# Patient Record
Sex: FEMALE | Race: WHITE | Employment: FULL TIME
[De-identification: names, ages, dates, MRNs, and addresses within clinical notes are randomized per-mention and may not be internally consistent; named-entity substitution may affect disease eponyms.]

---

## 2017-01-03 ENCOUNTER — TELEPHONE (OUTPATIENT)
Dept: FAMILY MEDICINE CLINIC | Facility: CLINIC | Age: 27
End: 2017-01-03

## 2017-01-03 DIAGNOSIS — F17.200 SMOKES AND MOTIVATED TO QUIT: Primary | ICD-10-CM

## 2017-01-03 RX ORDER — VARENICLINE TARTRATE 25 MG
KIT ORAL
Qty: 53 EACH | Refills: 0 | Status: SHIPPED | OUTPATIENT
Start: 2017-01-03 | End: 2017-01-26 | Stop reason: DRUGHIGH

## 2017-01-06 DIAGNOSIS — R05.9 COUGH: Primary | ICD-10-CM

## 2017-01-06 DIAGNOSIS — J01.00 ACUTE NON-RECURRENT MAXILLARY SINUSITIS: ICD-10-CM

## 2017-01-06 RX ORDER — LORATADINE/PSEUDOEPHEDRINE 10MG-240MG
TABLET, EXTENDED RELEASE 24 HR ORAL
Qty: 10 TABLET | Refills: 0 | Status: SHIPPED | OUTPATIENT
Start: 2017-01-06 | End: 2017-01-12 | Stop reason: ALTCHOICE

## 2017-01-06 RX ORDER — DOXYCYCLINE HYCLATE 100 MG
TABLET ORAL
Qty: 20 TABLET | OUTPATIENT
Start: 2017-01-06

## 2017-01-06 RX ORDER — CODEINE PHOSPHATE AND GUAIFENESIN 10; 100 MG/5ML; MG/5ML
SOLUTION ORAL
Qty: 140 ML | Refills: 0 | Status: SHIPPED | OUTPATIENT
Start: 2017-01-06 | End: 2017-01-12 | Stop reason: ALTCHOICE

## 2017-01-12 ENCOUNTER — OFFICE VISIT (OUTPATIENT)
Dept: UROLOGY | Facility: CLINIC | Age: 27
End: 2017-01-12

## 2017-01-12 VITALS
BODY MASS INDEX: 29.66 KG/M2 | HEIGHT: 67 IN | SYSTOLIC BLOOD PRESSURE: 104 MMHG | WEIGHT: 189 LBS | DIASTOLIC BLOOD PRESSURE: 67 MMHG | TEMPERATURE: 97.9 F | HEART RATE: 90 BPM

## 2017-01-12 DIAGNOSIS — R10.9 FLANK PAIN: ICD-10-CM

## 2017-01-12 DIAGNOSIS — R31.9 HEMATURIA: Primary | ICD-10-CM

## 2017-01-12 LAB
BILIRUBIN, POC: ABNORMAL
BLOOD URINE, POC: ABNORMAL
CLARITY, POC: CLEAR
COLOR, POC: YELLOW
GLUCOSE URINE, POC: ABNORMAL
KETONES, POC: ABNORMAL
LEUKOCYTE EST, POC: ABNORMAL
NITRITE, POC: ABNORMAL
PH, POC: ABNORMAL
PROTEIN, POC: ABNORMAL
SPECIFIC GRAVITY, POC: ABNORMAL
UROBILINOGEN, POC: ABNORMAL

## 2017-01-12 PROCEDURE — 81003 URINALYSIS AUTO W/O SCOPE: CPT | Performed by: UROLOGY

## 2017-01-12 PROCEDURE — 99204 OFFICE O/P NEW MOD 45 MIN: CPT | Performed by: UROLOGY

## 2017-01-12 ASSESSMENT — ENCOUNTER SYMPTOMS
NAUSEA: 0
COUGH: 0
ABDOMINAL PAIN: 1
WHEEZING: 0
EYE PAIN: 0
BACK PAIN: 1
SHORTNESS OF BREATH: 0
VOMITING: 0
COLOR CHANGE: 0
EYE REDNESS: 0

## 2017-01-26 DIAGNOSIS — F17.200 SMOKES AND MOTIVATED TO QUIT: Primary | ICD-10-CM

## 2017-01-26 RX ORDER — VARENICLINE TARTRATE 1 MG/1
TABLET, FILM COATED ORAL
Qty: 56 TABLET | Refills: 3 | Status: SHIPPED | OUTPATIENT
Start: 2017-01-26 | End: 2017-02-28

## 2017-01-27 DIAGNOSIS — R10.9 ACUTE LEFT FLANK PAIN: ICD-10-CM

## 2017-01-27 DIAGNOSIS — M54.50 ACUTE EXACERBATION OF CHRONIC LOW BACK PAIN: ICD-10-CM

## 2017-01-27 DIAGNOSIS — G89.29 ACUTE EXACERBATION OF CHRONIC LOW BACK PAIN: ICD-10-CM

## 2017-01-27 DIAGNOSIS — R31.9 HEMATURIA: ICD-10-CM

## 2017-01-27 RX ORDER — CYCLOBENZAPRINE HCL 5 MG
TABLET ORAL
Qty: 90 TABLET | Refills: 1 | Status: SHIPPED | OUTPATIENT
Start: 2017-01-27 | End: 2017-02-28 | Stop reason: ALTCHOICE

## 2017-01-27 RX ORDER — TAMSULOSIN HYDROCHLORIDE 0.4 MG/1
CAPSULE ORAL
Qty: 30 CAPSULE | OUTPATIENT
Start: 2017-01-27

## 2017-02-06 RX ORDER — FLUCONAZOLE 150 MG/1
150 TABLET ORAL ONCE
Qty: 2 TABLET | Refills: 0 | Status: SHIPPED | OUTPATIENT
Start: 2017-02-06 | End: 2017-02-06

## 2017-02-08 DIAGNOSIS — J30.2 SEASONAL ALLERGIC RHINITIS, UNSPECIFIED ALLERGIC RHINITIS TRIGGER: ICD-10-CM

## 2017-02-08 RX ORDER — CETIRIZINE HYDROCHLORIDE 10 MG/1
10 TABLET ORAL DAILY
Qty: 30 TABLET | Refills: 11 | Status: SHIPPED | OUTPATIENT
Start: 2017-02-08 | End: 2017-12-14 | Stop reason: SDUPTHER

## 2017-02-20 DIAGNOSIS — J01.10 ACUTE FRONTAL SINUSITIS, RECURRENCE NOT SPECIFIED: ICD-10-CM

## 2017-02-20 DIAGNOSIS — R73.03 PREDIABETES: ICD-10-CM

## 2017-02-20 RX ORDER — METFORMIN HYDROCHLORIDE 500 MG/1
TABLET, EXTENDED RELEASE ORAL
Qty: 60 TABLET | Refills: 5 | Status: SHIPPED | OUTPATIENT
Start: 2017-02-20 | End: 2017-02-28

## 2017-02-24 DIAGNOSIS — R10.9 ACUTE LEFT FLANK PAIN: ICD-10-CM

## 2017-02-24 DIAGNOSIS — M54.9 CHRONIC BACK PAIN GREATER THAN 3 MONTHS DURATION: ICD-10-CM

## 2017-02-24 DIAGNOSIS — K29.00 ACUTE SUPERFICIAL GASTRITIS WITHOUT HEMORRHAGE: ICD-10-CM

## 2017-02-24 DIAGNOSIS — M54.50 ACUTE EXACERBATION OF CHRONIC LOW BACK PAIN: ICD-10-CM

## 2017-02-24 DIAGNOSIS — G89.29 CHRONIC BACK PAIN GREATER THAN 3 MONTHS DURATION: ICD-10-CM

## 2017-02-24 DIAGNOSIS — K21.9 GASTROESOPHAGEAL REFLUX DISEASE WITHOUT ESOPHAGITIS: Primary | ICD-10-CM

## 2017-02-24 DIAGNOSIS — G89.29 ACUTE EXACERBATION OF CHRONIC LOW BACK PAIN: ICD-10-CM

## 2017-02-24 DIAGNOSIS — R31.9 HEMATURIA: ICD-10-CM

## 2017-02-24 DIAGNOSIS — K21.9 GASTROESOPHAGEAL REFLUX DISEASE WITHOUT ESOPHAGITIS: ICD-10-CM

## 2017-02-24 RX ORDER — RANITIDINE 150 MG/1
TABLET ORAL
Qty: 60 TABLET | Refills: 2 | Status: SHIPPED | OUTPATIENT
Start: 2017-02-24 | End: 2017-02-24 | Stop reason: SDUPTHER

## 2017-02-24 RX ORDER — IBUPROFEN 800 MG/1
TABLET ORAL
Qty: 90 TABLET | Refills: 1 | Status: SHIPPED | OUTPATIENT
Start: 2017-02-24 | End: 2017-04-21 | Stop reason: SDUPTHER

## 2017-02-24 RX ORDER — RANITIDINE 150 MG/1
CAPSULE ORAL
Qty: 60 CAPSULE | Refills: 5 | Status: SHIPPED | OUTPATIENT
Start: 2017-02-24 | End: 2017-02-28

## 2017-02-28 ENCOUNTER — OFFICE VISIT (OUTPATIENT)
Dept: FAMILY MEDICINE CLINIC | Facility: CLINIC | Age: 27
End: 2017-02-28

## 2017-02-28 VITALS
WEIGHT: 190 LBS | HEART RATE: 87 BPM | HEIGHT: 65 IN | DIASTOLIC BLOOD PRESSURE: 74 MMHG | RESPIRATION RATE: 15 BRPM | SYSTOLIC BLOOD PRESSURE: 111 MMHG | TEMPERATURE: 96.8 F | BODY MASS INDEX: 31.65 KG/M2

## 2017-02-28 DIAGNOSIS — E66.9 OBESITY (BMI 30.0-34.9): ICD-10-CM

## 2017-02-28 DIAGNOSIS — J20.9 ACUTE BRONCHITIS DUE TO INFECTION: Primary | ICD-10-CM

## 2017-02-28 DIAGNOSIS — R51.9 HEADACHE DISORDER: ICD-10-CM

## 2017-02-28 DIAGNOSIS — R53.82 CHRONIC FATIGUE: ICD-10-CM

## 2017-02-28 DIAGNOSIS — F17.200 SMOKES AND MOTIVATED TO QUIT: ICD-10-CM

## 2017-02-28 DIAGNOSIS — J01.10 ACUTE NON-RECURRENT FRONTAL SINUSITIS: ICD-10-CM

## 2017-02-28 DIAGNOSIS — Z23 NEED FOR TDAP VACCINATION: ICD-10-CM

## 2017-02-28 DIAGNOSIS — Z23 NEED FOR PNEUMOCOCCAL VACCINATION: ICD-10-CM

## 2017-02-28 PROCEDURE — 99214 OFFICE O/P EST MOD 30 MIN: CPT | Performed by: FAMILY MEDICINE

## 2017-02-28 PROCEDURE — 90732 PPSV23 VACC 2 YRS+ SUBQ/IM: CPT | Performed by: FAMILY MEDICINE

## 2017-02-28 PROCEDURE — 90471 IMMUNIZATION ADMIN: CPT | Performed by: FAMILY MEDICINE

## 2017-02-28 RX ORDER — BENZONATATE 100 MG/1
100 CAPSULE ORAL 3 TIMES DAILY PRN
Qty: 21 CAPSULE | Refills: 0 | Status: SHIPPED | OUTPATIENT
Start: 2017-02-28 | End: 2017-03-07

## 2017-02-28 RX ORDER — BUPROPION HYDROCHLORIDE 150 MG/1
150 TABLET ORAL EVERY MORNING
Qty: 30 TABLET | Refills: 3 | Status: SHIPPED | OUTPATIENT
Start: 2017-02-28 | End: 2017-08-21

## 2017-02-28 RX ORDER — KETOROLAC TROMETHAMINE 30 MG/ML
30 INJECTION, SOLUTION INTRAMUSCULAR; INTRAVENOUS ONCE
Status: COMPLETED | OUTPATIENT
Start: 2017-02-28 | End: 2017-02-28

## 2017-02-28 RX ORDER — AMOXICILLIN AND CLAVULANATE POTASSIUM 875; 125 MG/1; MG/1
1 TABLET, FILM COATED ORAL 2 TIMES DAILY
Qty: 20 TABLET | Refills: 0 | Status: SHIPPED | OUTPATIENT
Start: 2017-02-28 | End: 2017-03-10

## 2017-02-28 RX ORDER — ECHINACEA PURPUREA EXTRACT 125 MG
2 TABLET ORAL PRN
Qty: 1 BOTTLE | Refills: 3 | Status: SHIPPED | OUTPATIENT
Start: 2017-02-28 | End: 2017-03-20 | Stop reason: SDUPTHER

## 2017-02-28 RX ORDER — ONDANSETRON 4 MG/1
4 TABLET, FILM COATED ORAL EVERY 6 HOURS PRN
Qty: 20 TABLET | Refills: 0 | Status: SHIPPED | OUTPATIENT
Start: 2017-02-28 | End: 2018-10-30

## 2017-02-28 RX ORDER — BUTALBITAL, ACETAMINOPHEN AND CAFFEINE 50; 325; 40 MG/1; MG/1; MG/1
1 TABLET ORAL EVERY 6 HOURS PRN
Qty: 20 TABLET | Refills: 0 | Status: SHIPPED | OUTPATIENT
Start: 2017-02-28 | End: 2017-10-03 | Stop reason: ALTCHOICE

## 2017-02-28 RX ORDER — GUAIFENESIN 600 MG/1
600 TABLET, EXTENDED RELEASE ORAL 2 TIMES DAILY
Qty: 30 TABLET | Refills: 0 | Status: SHIPPED | OUTPATIENT
Start: 2017-02-28 | End: 2017-08-21

## 2017-02-28 RX ADMIN — KETOROLAC TROMETHAMINE 30 MG: 30 INJECTION, SOLUTION INTRAMUSCULAR; INTRAVENOUS at 11:07

## 2017-02-28 ASSESSMENT — ENCOUNTER SYMPTOMS
RHINORRHEA: 1
COUGH: 1
WHEEZING: 0
ABDOMINAL PAIN: 0
SORE THROAT: 0
VOMITING: 1
SHORTNESS OF BREATH: 0
NAUSEA: 1
CHEST TIGHTNESS: 0
CONSTIPATION: 0
SINUS PRESSURE: 1
BACK PAIN: 1
DIARRHEA: 0
ABDOMINAL DISTENTION: 0

## 2017-02-28 ASSESSMENT — PATIENT HEALTH QUESTIONNAIRE - PHQ9
2. FEELING DOWN, DEPRESSED OR HOPELESS: 0
SUM OF ALL RESPONSES TO PHQ9 QUESTIONS 1 & 2: 0
1. LITTLE INTEREST OR PLEASURE IN DOING THINGS: 0
SUM OF ALL RESPONSES TO PHQ QUESTIONS 1-9: 0

## 2017-03-20 DIAGNOSIS — R73.03 PREDIABETES: ICD-10-CM

## 2017-03-20 DIAGNOSIS — J01.10 ACUTE FRONTAL SINUSITIS, RECURRENCE NOT SPECIFIED: ICD-10-CM

## 2017-03-20 RX ORDER — METFORMIN HYDROCHLORIDE 500 MG/1
TABLET, EXTENDED RELEASE ORAL
Qty: 60 TABLET | OUTPATIENT
Start: 2017-03-20

## 2017-03-24 DIAGNOSIS — G89.29 ACUTE EXACERBATION OF CHRONIC LOW BACK PAIN: ICD-10-CM

## 2017-03-24 DIAGNOSIS — M54.50 ACUTE EXACERBATION OF CHRONIC LOW BACK PAIN: ICD-10-CM

## 2017-03-24 RX ORDER — CYCLOBENZAPRINE HCL 5 MG
TABLET ORAL
Qty: 90 TABLET | Refills: 2 | Status: SHIPPED | OUTPATIENT
Start: 2017-03-24 | End: 2017-05-04 | Stop reason: SINTOL

## 2017-04-04 ENCOUNTER — OFFICE VISIT (OUTPATIENT)
Dept: FAMILY MEDICINE CLINIC | Age: 27
End: 2017-04-04
Payer: MEDICARE

## 2017-04-04 VITALS
SYSTOLIC BLOOD PRESSURE: 141 MMHG | WEIGHT: 192 LBS | HEIGHT: 65 IN | BODY MASS INDEX: 31.99 KG/M2 | HEART RATE: 91 BPM | RESPIRATION RATE: 19 BRPM | DIASTOLIC BLOOD PRESSURE: 88 MMHG

## 2017-04-04 DIAGNOSIS — J01.01 ACUTE RECURRENT MAXILLARY SINUSITIS: Primary | ICD-10-CM

## 2017-04-04 DIAGNOSIS — H60.501 ACUTE OTITIS EXTERNA OF RIGHT EAR, UNSPECIFIED TYPE: ICD-10-CM

## 2017-04-04 PROCEDURE — 99214 OFFICE O/P EST MOD 30 MIN: CPT | Performed by: NURSE PRACTITIONER

## 2017-04-04 RX ORDER — BENZONATATE 100 MG/1
100 CAPSULE ORAL 3 TIMES DAILY PRN
Qty: 30 CAPSULE | Refills: 0 | Status: SHIPPED | OUTPATIENT
Start: 2017-04-04 | End: 2017-04-14

## 2017-04-04 RX ORDER — CIPROFLOXACIN AND DEXAMETHASONE 3; 1 MG/ML; MG/ML
4 SUSPENSION/ DROPS AURICULAR (OTIC) 2 TIMES DAILY
Qty: 7.5 ML | Refills: 0 | Status: SHIPPED | OUTPATIENT
Start: 2017-04-04 | End: 2017-04-11

## 2017-04-04 RX ORDER — CLARITHROMYCIN 500 MG/1
500 TABLET, COATED ORAL 2 TIMES DAILY
Qty: 20 TABLET | Refills: 0 | Status: SHIPPED | OUTPATIENT
Start: 2017-04-04 | End: 2017-04-14

## 2017-04-04 ASSESSMENT — ENCOUNTER SYMPTOMS
SHORTNESS OF BREATH: 0
RHINORRHEA: 1
NAUSEA: 0
SORE THROAT: 1
COUGH: 1
ABDOMINAL PAIN: 0
SINUS PRESSURE: 1

## 2017-04-11 ENCOUNTER — TELEPHONE (OUTPATIENT)
Dept: FAMILY MEDICINE CLINIC | Age: 27
End: 2017-04-11

## 2017-04-11 DIAGNOSIS — Z20.5 EXPOSURE TO HEPATITIS C: Primary | ICD-10-CM

## 2017-04-15 DIAGNOSIS — F17.200 SMOKES AND MOTIVATED TO QUIT: ICD-10-CM

## 2017-04-15 RX ORDER — VARENICLINE TARTRATE 1 MG/1
TABLET, FILM COATED ORAL
Qty: 56 TABLET | Refills: 2 | Status: SHIPPED | OUTPATIENT
Start: 2017-04-15 | End: 2017-05-11 | Stop reason: SDUPTHER

## 2017-04-17 DIAGNOSIS — J30.2 SEASONAL ALLERGIC RHINITIS, UNSPECIFIED ALLERGIC RHINITIS TRIGGER: ICD-10-CM

## 2017-04-17 RX ORDER — MOMETASONE FUROATE 50 UG/1
SPRAY, METERED NASAL
Qty: 17 G | Refills: 3 | Status: SHIPPED | OUTPATIENT
Start: 2017-04-17 | End: 2017-07-26 | Stop reason: SDUPTHER

## 2017-04-21 DIAGNOSIS — M54.9 CHRONIC BACK PAIN GREATER THAN 3 MONTHS DURATION: ICD-10-CM

## 2017-04-21 DIAGNOSIS — R10.9 ACUTE LEFT FLANK PAIN: ICD-10-CM

## 2017-04-21 DIAGNOSIS — G89.29 ACUTE EXACERBATION OF CHRONIC LOW BACK PAIN: ICD-10-CM

## 2017-04-21 DIAGNOSIS — R31.9 HEMATURIA: ICD-10-CM

## 2017-04-21 DIAGNOSIS — M54.50 ACUTE EXACERBATION OF CHRONIC LOW BACK PAIN: ICD-10-CM

## 2017-04-21 DIAGNOSIS — G89.29 CHRONIC BACK PAIN GREATER THAN 3 MONTHS DURATION: ICD-10-CM

## 2017-04-21 RX ORDER — IBUPROFEN 800 MG/1
TABLET ORAL
Qty: 90 TABLET | Refills: 1 | Status: SHIPPED | OUTPATIENT
Start: 2017-04-21 | End: 2017-05-04 | Stop reason: ALTCHOICE

## 2017-04-21 RX ORDER — ACETAMINOPHEN 500 MG
TABLET ORAL
Qty: 120 TABLET | Refills: 1 | Status: SHIPPED | OUTPATIENT
Start: 2017-04-21 | End: 2017-06-13 | Stop reason: SDUPTHER

## 2017-04-27 ENCOUNTER — TELEPHONE (OUTPATIENT)
Dept: UROLOGY | Age: 27
End: 2017-04-27

## 2017-05-02 ENCOUNTER — HOSPITAL ENCOUNTER (OUTPATIENT)
Age: 27
Discharge: HOME | End: 2017-05-02
Payer: MEDICARE

## 2017-05-02 DIAGNOSIS — R53.82 CHRONIC FATIGUE: ICD-10-CM

## 2017-05-02 DIAGNOSIS — E66.9 OBESITY (BMI 30.0-34.9): ICD-10-CM

## 2017-05-02 DIAGNOSIS — Z20.5 EXPOSURE TO HEPATITIS C: ICD-10-CM

## 2017-05-02 LAB
ALBUMIN SERPL-MCNC: 4.7 G/DL (ref 3.5–5.2)
ALBUMIN/GLOBULIN RATIO: NORMAL (ref 1–2.5)
ALP BLD-CCNC: 70 U/L (ref 35–104)
ALT SERPL-CCNC: 20 U/L (ref 5–33)
ANION GAP SERPL CALCULATED.3IONS-SCNC: 12 MMOL/L (ref 9–17)
AST SERPL-CCNC: 16 U/L
BILIRUB SERPL-MCNC: 0.33 MG/DL (ref 0.3–1.2)
BUN BLDV-MCNC: 11 MG/DL (ref 6–20)
BUN/CREAT BLD: NORMAL (ref 9–20)
CALCIUM SERPL-MCNC: 9.7 MG/DL (ref 8.6–10.4)
CHLORIDE BLD-SCNC: 100 MMOL/L (ref 98–107)
CO2: 27 MMOL/L (ref 20–31)
CREAT SERPL-MCNC: 0.53 MG/DL (ref 0.5–0.9)
GFR AFRICAN AMERICAN: >60 ML/MIN
GFR NON-AFRICAN AMERICAN: >60 ML/MIN
GFR SERPL CREATININE-BSD FRML MDRD: NORMAL ML/MIN/{1.73_M2}
GFR SERPL CREATININE-BSD FRML MDRD: NORMAL ML/MIN/{1.73_M2}
GLUCOSE BLD-MCNC: 91 MG/DL (ref 70–99)
HCT VFR BLD CALC: 40.6 % (ref 36–46)
HEMOGLOBIN: 14.3 G/DL (ref 12–16)
HEPATITIS C ANTIBODY: NONREACTIVE
MCH RBC QN AUTO: 31.7 PG (ref 26–34)
MCHC RBC AUTO-ENTMCNC: 35.2 G/DL (ref 31–37)
MCV RBC AUTO: 90.1 FL (ref 80–100)
PDW BLD-RTO: 12.5 % (ref 11.5–14.9)
PLATELET # BLD: 315 K/UL (ref 150–450)
PMV BLD AUTO: 8.2 FL (ref 6–12)
POTASSIUM SERPL-SCNC: 4 MMOL/L (ref 3.7–5.3)
RBC # BLD: 4.51 M/UL (ref 4–5.2)
SODIUM BLD-SCNC: 139 MMOL/L (ref 135–144)
TOTAL PROTEIN: 7.9 G/DL (ref 6.4–8.3)
TSH SERPL DL<=0.05 MIU/L-ACNC: 1.09 MIU/L (ref 0.3–5)
WBC # BLD: 10.9 K/UL (ref 3.5–11)

## 2017-05-02 PROCEDURE — 85027 COMPLETE CBC AUTOMATED: CPT

## 2017-05-02 PROCEDURE — 84443 ASSAY THYROID STIM HORMONE: CPT

## 2017-05-02 PROCEDURE — 36415 COLL VENOUS BLD VENIPUNCTURE: CPT

## 2017-05-02 PROCEDURE — 80053 COMPREHEN METABOLIC PANEL: CPT

## 2017-05-02 PROCEDURE — 86803 HEPATITIS C AB TEST: CPT

## 2017-05-04 ENCOUNTER — OFFICE VISIT (OUTPATIENT)
Dept: FAMILY MEDICINE CLINIC | Age: 27
End: 2017-05-04
Payer: MEDICARE

## 2017-05-04 VITALS
SYSTOLIC BLOOD PRESSURE: 101 MMHG | TEMPERATURE: 96.8 F | BODY MASS INDEX: 31.49 KG/M2 | RESPIRATION RATE: 20 BRPM | HEIGHT: 65 IN | HEART RATE: 90 BPM | WEIGHT: 189 LBS | DIASTOLIC BLOOD PRESSURE: 66 MMHG | OXYGEN SATURATION: 97 %

## 2017-05-04 DIAGNOSIS — G89.29 ACUTE EXACERBATION OF CHRONIC LOW BACK PAIN: Primary | ICD-10-CM

## 2017-05-04 DIAGNOSIS — R07.89 MUSCULOSKELETAL CHEST PAIN: ICD-10-CM

## 2017-05-04 DIAGNOSIS — Z23 NEED FOR TDAP VACCINATION: ICD-10-CM

## 2017-05-04 DIAGNOSIS — M54.50 ACUTE EXACERBATION OF CHRONIC LOW BACK PAIN: Primary | ICD-10-CM

## 2017-05-04 PROCEDURE — 99213 OFFICE O/P EST LOW 20 MIN: CPT | Performed by: FAMILY MEDICINE

## 2017-05-04 RX ORDER — NAPROXEN 500 MG/1
500 TABLET ORAL 2 TIMES DAILY WITH MEALS
Qty: 60 TABLET | Refills: 0 | Status: SHIPPED | OUTPATIENT
Start: 2017-05-04 | End: 2017-06-01 | Stop reason: SDUPTHER

## 2017-05-04 RX ORDER — TIZANIDINE 4 MG/1
4 TABLET ORAL EVERY 8 HOURS PRN
Qty: 90 TABLET | Refills: 0 | Status: SHIPPED | OUTPATIENT
Start: 2017-05-04 | End: 2017-06-01 | Stop reason: SDUPTHER

## 2017-05-04 RX ORDER — TRAMADOL HYDROCHLORIDE 50 MG/1
50 TABLET ORAL EVERY 8 HOURS PRN
Qty: 12 TABLET | Refills: 0 | Status: SHIPPED | OUTPATIENT
Start: 2017-05-04 | End: 2017-05-08

## 2017-05-04 RX ORDER — LIDOCAINE 40 MG/G
CREAM TOPICAL
Qty: 45 G | Refills: 2 | Status: SHIPPED | OUTPATIENT
Start: 2017-05-04 | End: 2017-06-29 | Stop reason: SDUPTHER

## 2017-05-04 ASSESSMENT — ENCOUNTER SYMPTOMS
ABDOMINAL DISTENTION: 0
STRIDOR: 0
SHORTNESS OF BREATH: 0
CHEST TIGHTNESS: 0
BACK PAIN: 1
ABDOMINAL PAIN: 0
WHEEZING: 0
COUGH: 0
CHOKING: 0

## 2017-05-06 PROBLEM — R07.89 MUSCULOSKELETAL CHEST PAIN: Status: ACTIVE | Noted: 2017-05-06

## 2017-05-06 PROBLEM — M54.50 ACUTE EXACERBATION OF CHRONIC LOW BACK PAIN: Status: ACTIVE | Noted: 2017-05-06

## 2017-05-06 PROBLEM — G89.29 ACUTE EXACERBATION OF CHRONIC LOW BACK PAIN: Status: ACTIVE | Noted: 2017-05-06

## 2017-05-11 DIAGNOSIS — F17.200 SMOKES AND MOTIVATED TO QUIT: ICD-10-CM

## 2017-05-11 RX ORDER — VARENICLINE TARTRATE 1 MG/1
TABLET, FILM COATED ORAL
Qty: 56 TABLET | Refills: 3 | Status: SHIPPED | OUTPATIENT
Start: 2017-05-11 | End: 2017-08-21

## 2017-06-01 DIAGNOSIS — G89.29 ACUTE EXACERBATION OF CHRONIC LOW BACK PAIN: ICD-10-CM

## 2017-06-01 DIAGNOSIS — R07.89 MUSCULOSKELETAL CHEST PAIN: ICD-10-CM

## 2017-06-01 DIAGNOSIS — M54.50 ACUTE EXACERBATION OF CHRONIC LOW BACK PAIN: ICD-10-CM

## 2017-06-01 RX ORDER — TIZANIDINE 4 MG/1
TABLET ORAL
Qty: 90 TABLET | Refills: 0 | Status: SHIPPED | OUTPATIENT
Start: 2017-06-01 | End: 2017-06-29 | Stop reason: SDUPTHER

## 2017-06-01 RX ORDER — NAPROXEN 500 MG/1
TABLET ORAL
Qty: 60 TABLET | Refills: 0 | Status: SHIPPED | OUTPATIENT
Start: 2017-06-01 | End: 2017-06-29 | Stop reason: SDUPTHER

## 2017-06-13 DIAGNOSIS — G89.29 ACUTE EXACERBATION OF CHRONIC LOW BACK PAIN: ICD-10-CM

## 2017-06-13 DIAGNOSIS — M54.50 ACUTE EXACERBATION OF CHRONIC LOW BACK PAIN: ICD-10-CM

## 2017-06-13 RX ORDER — CYCLOBENZAPRINE HCL 5 MG
TABLET ORAL
Qty: 90 TABLET | OUTPATIENT
Start: 2017-06-13

## 2017-06-13 RX ORDER — ACETAMINOPHEN 500 MG
TABLET ORAL
Qty: 120 TABLET | Refills: 1 | Status: SHIPPED | OUTPATIENT
Start: 2017-06-13 | End: 2017-07-12 | Stop reason: SDUPTHER

## 2017-07-04 DIAGNOSIS — J01.10 ACUTE FRONTAL SINUSITIS, RECURRENCE NOT SPECIFIED: ICD-10-CM

## 2017-07-07 DIAGNOSIS — K21.9 GASTROESOPHAGEAL REFLUX DISEASE WITHOUT ESOPHAGITIS: ICD-10-CM

## 2017-07-07 RX ORDER — RANITIDINE 150 MG/1
CAPSULE ORAL
Qty: 60 CAPSULE | Refills: 1 | Status: SHIPPED | OUTPATIENT
Start: 2017-07-07 | End: 2017-08-04 | Stop reason: SDUPTHER

## 2017-07-12 DIAGNOSIS — G89.29 ACUTE EXACERBATION OF CHRONIC LOW BACK PAIN: ICD-10-CM

## 2017-07-12 DIAGNOSIS — M54.50 ACUTE EXACERBATION OF CHRONIC LOW BACK PAIN: ICD-10-CM

## 2017-07-12 RX ORDER — ACETAMINOPHEN 500 MG
TABLET ORAL
Qty: 120 TABLET | Refills: 2 | Status: SHIPPED | OUTPATIENT
Start: 2017-07-12 | End: 2017-11-02 | Stop reason: SDUPTHER

## 2017-07-26 DIAGNOSIS — J30.2 SEASONAL ALLERGIC RHINITIS, UNSPECIFIED ALLERGIC RHINITIS TRIGGER: ICD-10-CM

## 2017-07-26 RX ORDER — MOMETASONE FUROATE 50 UG/1
SPRAY, METERED NASAL
Qty: 17 G | Refills: 11 | Status: SHIPPED | OUTPATIENT
Start: 2017-07-26 | End: 2017-08-21

## 2017-07-27 DIAGNOSIS — G89.29 ACUTE EXACERBATION OF CHRONIC LOW BACK PAIN: ICD-10-CM

## 2017-07-27 DIAGNOSIS — M54.50 ACUTE EXACERBATION OF CHRONIC LOW BACK PAIN: ICD-10-CM

## 2017-07-27 DIAGNOSIS — R07.89 MUSCULOSKELETAL CHEST PAIN: ICD-10-CM

## 2017-07-28 RX ORDER — LIDOCAINE 40 MG/G
CREAM TOPICAL
Qty: 45 G | Refills: 3 | Status: SHIPPED | OUTPATIENT
Start: 2017-07-28 | End: 2017-08-21

## 2017-07-29 DIAGNOSIS — M54.50 ACUTE EXACERBATION OF CHRONIC LOW BACK PAIN: ICD-10-CM

## 2017-07-29 DIAGNOSIS — G89.29 ACUTE EXACERBATION OF CHRONIC LOW BACK PAIN: ICD-10-CM

## 2017-07-29 DIAGNOSIS — R07.89 MUSCULOSKELETAL CHEST PAIN: ICD-10-CM

## 2017-07-31 RX ORDER — TIZANIDINE 4 MG/1
TABLET ORAL
Qty: 90 TABLET | Refills: 1 | Status: SHIPPED | OUTPATIENT
Start: 2017-07-31 | End: 2017-08-21

## 2017-07-31 RX ORDER — NAPROXEN 500 MG/1
500 TABLET ORAL 2 TIMES DAILY PRN
Qty: 60 TABLET | Refills: 1 | Status: SHIPPED | OUTPATIENT
Start: 2017-07-31 | End: 2017-08-21

## 2017-08-04 DIAGNOSIS — K21.9 GASTROESOPHAGEAL REFLUX DISEASE WITHOUT ESOPHAGITIS: ICD-10-CM

## 2017-08-04 RX ORDER — RANITIDINE 150 MG/1
CAPSULE ORAL
Qty: 60 CAPSULE | Refills: 2 | Status: SHIPPED | OUTPATIENT
Start: 2017-08-04 | End: 2017-11-29 | Stop reason: SDUPTHER

## 2017-08-18 ENCOUNTER — TELEPHONE (OUTPATIENT)
Dept: FAMILY MEDICINE CLINIC | Age: 27
End: 2017-08-18

## 2017-08-18 DIAGNOSIS — J20.9 ACUTE BRONCHITIS DUE TO INFECTION: Primary | ICD-10-CM

## 2017-08-18 RX ORDER — AZITHROMYCIN 250 MG/1
TABLET, FILM COATED ORAL
Qty: 6 TABLET | Refills: 0 | Status: SHIPPED | OUTPATIENT
Start: 2017-08-18 | End: 2017-08-23

## 2017-08-21 ENCOUNTER — OFFICE VISIT (OUTPATIENT)
Dept: FAMILY MEDICINE CLINIC | Age: 27
End: 2017-08-21
Payer: MEDICARE

## 2017-08-21 VITALS
DIASTOLIC BLOOD PRESSURE: 72 MMHG | HEIGHT: 65 IN | TEMPERATURE: 97.7 F | BODY MASS INDEX: 31.65 KG/M2 | WEIGHT: 190 LBS | HEART RATE: 84 BPM | SYSTOLIC BLOOD PRESSURE: 116 MMHG | OXYGEN SATURATION: 96 %

## 2017-08-21 DIAGNOSIS — F17.200 SMOKES AND MOTIVATED TO QUIT: ICD-10-CM

## 2017-08-21 DIAGNOSIS — J20.9 BRONCHITIS, ACUTE, WITH BRONCHOSPASM: Primary | ICD-10-CM

## 2017-08-21 PROBLEM — R07.89 MUSCULOSKELETAL CHEST PAIN: Status: RESOLVED | Noted: 2017-05-06 | Resolved: 2017-08-21

## 2017-08-21 PROCEDURE — 96372 THER/PROPH/DIAG INJ SC/IM: CPT | Performed by: FAMILY MEDICINE

## 2017-08-21 PROCEDURE — 94640 AIRWAY INHALATION TREATMENT: CPT | Performed by: FAMILY MEDICINE

## 2017-08-21 PROCEDURE — 99214 OFFICE O/P EST MOD 30 MIN: CPT | Performed by: FAMILY MEDICINE

## 2017-08-21 RX ORDER — GUAIFENESIN AND CODEINE PHOSPHATE 100; 10 MG/5ML; MG/5ML
5 SOLUTION ORAL 4 TIMES DAILY PRN
Qty: 140 ML | Refills: 0 | Status: SHIPPED | OUTPATIENT
Start: 2017-08-21 | End: 2017-08-28

## 2017-08-21 RX ORDER — AMOXICILLIN AND CLAVULANATE POTASSIUM 875; 125 MG/1; MG/1
1 TABLET, FILM COATED ORAL 2 TIMES DAILY
Qty: 20 TABLET | Refills: 0 | Status: SHIPPED | OUTPATIENT
Start: 2017-08-21 | End: 2017-08-31

## 2017-08-21 RX ORDER — METHYLPREDNISOLONE SODIUM SUCCINATE 125 MG/2ML
125 INJECTION, POWDER, LYOPHILIZED, FOR SOLUTION INTRAMUSCULAR; INTRAVENOUS ONCE
Status: COMPLETED | OUTPATIENT
Start: 2017-08-21 | End: 2017-08-21

## 2017-08-21 RX ORDER — PREDNISONE 10 MG/1
50 TABLET ORAL DAILY
Qty: 35 TABLET | Refills: 0 | Status: SHIPPED | OUTPATIENT
Start: 2017-08-21 | End: 2017-08-28

## 2017-08-21 RX ORDER — ALBUTEROL SULFATE 2.5 MG/3ML
2.5 SOLUTION RESPIRATORY (INHALATION) ONCE
Status: COMPLETED | OUTPATIENT
Start: 2017-08-21 | End: 2017-08-21

## 2017-08-21 RX ORDER — ALBUTEROL SULFATE 90 UG/1
2 AEROSOL, METERED RESPIRATORY (INHALATION) EVERY 6 HOURS PRN
Qty: 1 INHALER | Refills: 1 | Status: SHIPPED | OUTPATIENT
Start: 2017-08-21 | End: 2017-10-06 | Stop reason: SDUPTHER

## 2017-08-21 RX ORDER — NICOTINE 21 MG/24HR
1 PATCH, TRANSDERMAL 24 HOURS TRANSDERMAL EVERY 24 HOURS
Qty: 30 PATCH | Refills: 0 | Status: SHIPPED | OUTPATIENT
Start: 2017-08-21 | End: 2017-09-16 | Stop reason: SDUPTHER

## 2017-08-21 RX ADMIN — ALBUTEROL SULFATE 2.5 MG: 2.5 SOLUTION RESPIRATORY (INHALATION) at 13:28

## 2017-08-21 RX ADMIN — METHYLPREDNISOLONE SODIUM SUCCINATE 125 MG: 125 INJECTION, POWDER, LYOPHILIZED, FOR SOLUTION INTRAMUSCULAR; INTRAVENOUS at 13:30

## 2017-08-21 ASSESSMENT — ENCOUNTER SYMPTOMS
SINUS PRESSURE: 0
WHEEZING: 0
COUGH: 1
SHORTNESS OF BREATH: 0
ABDOMINAL DISTENTION: 0
NAUSEA: 1
SORE THROAT: 1
ABDOMINAL PAIN: 0
TROUBLE SWALLOWING: 0
CONSTIPATION: 0
VOMITING: 1
CHEST TIGHTNESS: 1
DIARRHEA: 0

## 2017-08-24 DIAGNOSIS — J30.2 SEASONAL ALLERGIC RHINITIS, UNSPECIFIED ALLERGIC RHINITIS TRIGGER: ICD-10-CM

## 2017-08-24 RX ORDER — MOMETASONE FUROATE 50 UG/1
SPRAY, METERED NASAL
Qty: 17 G | Refills: 0 | Status: SHIPPED | OUTPATIENT
Start: 2017-08-24 | End: 2017-09-21 | Stop reason: SDUPTHER

## 2017-09-16 DIAGNOSIS — J20.9 BRONCHITIS, ACUTE, WITH BRONCHOSPASM: ICD-10-CM

## 2017-09-16 DIAGNOSIS — F17.200 SMOKES AND MOTIVATED TO QUIT: ICD-10-CM

## 2017-09-18 RX ORDER — NICOTINE 21 MG/24HR
PATCH, TRANSDERMAL 24 HOURS TRANSDERMAL
Qty: 30 PATCH | Refills: 1 | Status: SHIPPED | OUTPATIENT
Start: 2017-09-18 | End: 2017-10-03

## 2017-09-21 DIAGNOSIS — J30.2 SEASONAL ALLERGIC RHINITIS, UNSPECIFIED ALLERGIC RHINITIS TRIGGER: ICD-10-CM

## 2017-09-22 RX ORDER — MOMETASONE FUROATE 50 UG/1
SPRAY, METERED NASAL
Qty: 17 G | Refills: 5 | Status: SHIPPED | OUTPATIENT
Start: 2017-09-22 | End: 2018-10-31 | Stop reason: SDUPTHER

## 2017-10-03 ENCOUNTER — OFFICE VISIT (OUTPATIENT)
Dept: OBGYN CLINIC | Age: 27
End: 2017-10-03
Payer: MEDICARE

## 2017-10-03 ENCOUNTER — HOSPITAL ENCOUNTER (OUTPATIENT)
Age: 27
Setting detail: SPECIMEN
Discharge: HOME | End: 2017-10-03
Payer: MEDICARE

## 2017-10-03 VITALS
WEIGHT: 190 LBS | HEART RATE: 74 BPM | SYSTOLIC BLOOD PRESSURE: 122 MMHG | RESPIRATION RATE: 18 BRPM | BODY MASS INDEX: 29.82 KG/M2 | DIASTOLIC BLOOD PRESSURE: 70 MMHG | HEIGHT: 67 IN

## 2017-10-03 DIAGNOSIS — R10.9 ABDOMINAL PAIN, UNSPECIFIED LOCATION: ICD-10-CM

## 2017-10-03 DIAGNOSIS — R10.9 ABDOMINAL PAIN, UNSPECIFIED LOCATION: Primary | ICD-10-CM

## 2017-10-03 DIAGNOSIS — Z87.42 HISTORY OF OVARIAN CYST: ICD-10-CM

## 2017-10-03 DIAGNOSIS — R10.2 PELVIC PAIN IN FEMALE: ICD-10-CM

## 2017-10-03 LAB
BILIRUBIN URINE: NEGATIVE
COLOR: YELLOW
COMMENT UA: NORMAL
DIRECT EXAM: ABNORMAL
GLUCOSE URINE: NEGATIVE
KETONES, URINE: NEGATIVE
LEUKOCYTE ESTERASE, URINE: NEGATIVE
Lab: ABNORMAL
NITRITE, URINE: NEGATIVE
PH UA: 6.5 (ref 5–8)
PROTEIN UA: NEGATIVE
SPECIFIC GRAVITY UA: 1.02 (ref 1–1.03)
SPECIMEN DESCRIPTION: ABNORMAL
STATUS: ABNORMAL
TURBIDITY: CLEAR
URINE HGB: NEGATIVE
UROBILINOGEN, URINE: NORMAL

## 2017-10-03 PROCEDURE — 99214 OFFICE O/P EST MOD 30 MIN: CPT | Performed by: ADVANCED PRACTICE MIDWIFE

## 2017-10-03 PROCEDURE — 81003 URINALYSIS AUTO W/O SCOPE: CPT

## 2017-10-03 PROCEDURE — 87491 CHLMYD TRACH DNA AMP PROBE: CPT

## 2017-10-03 PROCEDURE — 87480 CANDIDA DNA DIR PROBE: CPT

## 2017-10-03 PROCEDURE — 87660 TRICHOMONAS VAGIN DIR PROBE: CPT

## 2017-10-03 PROCEDURE — 87510 GARDNER VAG DNA DIR PROBE: CPT

## 2017-10-03 PROCEDURE — 87591 N.GONORRHOEAE DNA AMP PROB: CPT

## 2017-10-03 ASSESSMENT — PATIENT HEALTH QUESTIONNAIRE - PHQ9
2. FEELING DOWN, DEPRESSED OR HOPELESS: 0
SUM OF ALL RESPONSES TO PHQ QUESTIONS 1-9: 0
SUM OF ALL RESPONSES TO PHQ9 QUESTIONS 1 & 2: 0
1. LITTLE INTEREST OR PLEASURE IN DOING THINGS: 0

## 2017-10-03 NOTE — MR AVS SNAPSHOT
After Visit Summary             Jamison Bill   10/3/2017 3:15 PM   Office Visit    Description:  Female : 1990   Provider:  Kendra Hills CNM   Department:  [id number] [facility]              Your Follow-Up and Future Appointments         Below is a list of your follow-up and future appointments. This may not be a complete list as you may have made appointments directly with providers that we are not aware of or your providers may have made some for you. Please call your providers to confirm appointments. It is important to keep your appointments. Please bring your current insurance card, photo ID, co-pay, and all medication bottles to your appointment. If self-pay, payment is expected at the time of service. Your To-Do List     Future Appointments Provider Department Dept Phone    10/31/2017 11:00 AM Jatinder Julio Gynecology [phone]    2017 2:00 PM Clair Miranda MD [facility] [phone]    Please arrive 15 minutes prior to appointment, bring photo ID and insurance card. Information from Your Visit        Department     Name Address Phone Fax    [address] [facility] [address] [facility]. [address] [place] [address] Way [phone]      You Were Seen for:         Comments    Abdominal pain, unspecified location   [[id number]]         Vital Signs     Blood Pressure Pulse Respirations Height Weight Last Menstrual Period    122/70 (Site: Right Arm, Position: Sitting, Cuff Size: Medium Adult) 74 18 5' 7\" (1.702 m) 190 lb (86.2 kg) 2012 (Approximate)    Breastfeeding? Body Mass Index Smoking Status             No 29.76 kg/m2 Current Every Day Smoker         Additional Information about your Body Mass Index (BMI)           Your BMI as listed above is considered overweight (25.0-29.9). BMI is an estimate of body fat, calculated from your height and weight.   The higher your BMI, the greater your risk of heart disease, high blood pressure, type 2 diabetes, stroke, gallstones, arthritis, sleep apnea, and certain cancers. BMI is not perfect. It may overestimate body fat in athletes and people who are more muscular. If your body fat is high you can improve your BMI by decreasing your calorie intake and becoming more physically active. Learn more at: [email or web address]             Today's Medication Changes          These changes are accurate as of: 10/3/17  3:24 PM.  If you have any questions, ask your nurse or doctor.                STOP taking these medications           butalbital-acetaminophen-caffeine -40 MG per tablet   Commonly known as:  FIORICET   Stopped by:  Jorge Alberto Meneses CNM       nicotine 21 MG/24HR   Commonly known as:  Miguel Castor   Stopped by:  Jorge Alberto Meneses CNM               Your Current Medications Are              mometasone (NASONEX) 50 MCG/ACT nasal spray INHALE 2 SPRAYS BY NASAL ROUTE DAILY    albuterol sulfate HFA (PROVENTIL HFA) 108 (90 Base) MCG/ACT inhaler Inhale 2 puffs into the lungs every 6 hours as needed for Wheezing or Shortness of Breath    ranitidine (ZANTAC) 150 MG capsule TAKE 1 TABLET BY MOUTH 2 TIMES DAILY    MAPAP 500 MG tablet TAKE 1 TABLET BY MOUTH EVERY 6 HOURS AS NEEDED FOR PAIN    ondansetron (ZOFRAN) 4 MG tablet Take 1 tablet by mouth every 6 hours as needed for Nausea or Vomiting    cetirizine (ZYRTEC) 10 MG tablet Take 1 tablet by mouth daily      Allergies              Levaquin [Levofloxacin In D5w]     Tendon pain    Shellfish-derived Products          Additional Information        Basic Information     Date Of Birth Sex Race Ethnicity Preferred Language    1990 Female White Non-/Non  English      Problem List as of 10/3/2017  Date Reviewed: 8/21/2017                S/P GONZALO, RSO, appendectomy 7/7/14    Tobacco abuse    Dysmenorrhea

## 2017-10-03 NOTE — PROGRESS NOTES
Past Surgical History:   Procedure Laterality Date    APPENDECTOMY  7/7/14    Dr Bill Morelos  7/22/13    [facility] with LEEP    HYSTERECTOMY  7/7/14    GONZALO with RSO and Appendectomy, pathology benign    HYSTEROSCOPY  3/5/2012    LAPAROSCOPY  05-28-14    diagnostic w/pelvic washing    LEEP  7/22/13    BRUCE I-II, neg margins, done with ablation    TUBAL LIGATION  2012    Essure, also with a hysteroscopy    WISDOM TOOTH EXTRACTION         Family History   Problem Relation Age of Onset    Diabetes Maternal Grandmother     Heart Disease Maternal Grandmother     Heart Disease Maternal Grandfather     Hypertension Father     Diabetes Mother     Diabetes Sister     Down Syndrome Son     Ovarian Cancer Paternal Grandmother      >72 yo    Breast Cancer Paternal [de-identified]      >51 yo    Urolithiasis Brother        Social History     Social History    Marital status:      Spouse name: N/A    Number of children: N/A    Years of education: N/A     Occupational History    Not on file.      Social History Main Topics    Smoking status: Current Every Day Smoker     Packs/day: 0.50     Years: 9.00     Types: Cigarettes    Smokeless tobacco: Never Used    Alcohol use 0.0 oz/week     0 Standard drinks or equivalent per week      Comment: occasionally    Drug use: No    Sexual activity: Yes     Partners: Male     Birth control/ protection: Surgical      Comment: hyst     Other Topics Concern    Not on file     Social History Narrative    She has 3 children: 1 has short stature and 1 has Down syndrome             MEDICATIONS:  Current Outpatient Prescriptions   Medication Sig Dispense Refill    mometasone (NASONEX) 50 MCG/ACT nasal spray INHALE 2 SPRAYS BY NASAL ROUTE DAILY 17 g 5    albuterol sulfate HFA (PROVENTIL HFA) 108 (90 Base) MCG/ACT inhaler Inhale 2 puffs into the lungs every 6 hours as needed for Wheezing or Shortness of Breath 1 Inhaler 1    ranitidine 07/04/2012, not currently breastfeeding. Abdomen: Soft non-tender; good bowel sounds. No guarding, rebound or rigidity. No CVA tenderness bilaterally. +left side tenderness with palpation    Extremities: No calf tenderness, DTR 2/4, and No edema bilaterally    Pelvic: Vulva and vagina appear normal. Bimanual exam reveals normal uterus and adnexa. Left side tenderness with bimanual exam    Diagnostics:  No results found. Lab Results:  Results for orders placed or performed during the hospital encounter of 05/02/17   CBC   Result Value Ref Range    WBC 10.9 3.5 - 11.0 k/uL    RBC 4.51 4.0 - 5.2 m/uL    Hemoglobin 14.3 12.0 - 16.0 g/dL    Hematocrit 40.6 36 - 46 %    MCV 90.1 80 - 100 fL    MCH 31.7 26 - 34 pg    MCHC 35.2 31 - 37 g/dL    RDW 12.5 11.5 - 14.9 %    Platelets 798 047 - 810 k/uL    MPV 8.2 6.0 - 12.0 fL   Comprehensive Metabolic Panel   Result Value Ref Range    Glucose 91 70 - 99 mg/dL    BUN 11 6 - 20 mg/dL    CREATININE 0.53 0.50 - 0.90 mg/dL    Bun/Cre Ratio NOT REPORTED 9 - 20    Calcium 9.7 8.6 - 10.4 mg/dL    Sodium 139 135 - 144 mmol/L    Potassium 4.0 3.7 - 5.3 mmol/L    Chloride 100 98 - 107 mmol/L    CO2 27 20 - 31 mmol/L    Anion Gap 12 9 - 17 mmol/L    Alkaline Phosphatase 70 35 - 104 U/L    ALT 20 5 - 33 U/L    AST 16 <32 U/L    Total Bilirubin 0.33 0.3 - 1.2 mg/dL    Total Protein 7.9 6.4 - 8.3 g/dL    Alb 4.7 3.5 - 5.2 g/dL    Albumin/Globulin Ratio NOT REPORTED 1.0 - 2.5    GFR Non-African American >60 >60 mL/min    GFR African American >60 >60 mL/min    GFR Comment          GFR Staging NOT REPORTED    TSH without Reflex   Result Value Ref Range    TSH 1.09 0.30 - 5.00 mIU/L   Hepatitis C Antibody   Result Value Ref Range    Hepatitis C Ab NONREACTIVE NR         Assessment:  1. Abdominal pain, unspecified location  VAGINITIS DNA PROBE    C. Trachomatis / N. Gonorrhoeae, DNA    UA W/REFLEX CULTURE    US Non OB Transvaginal   2.  Pelvic pain in female  US Non OB Transvaginal Patient Active Problem List    Diagnosis Date Noted    S/P GONZALO, RSO, appendectomy 7/7/14 07/07/2014     Priority: High    Tobacco abuse 04/08/2014     Priority: High     The patient was instructed to stop smoking. Morbidity, mortality, and cessation programs were reviewed. Worsening of long and short term medical health risks were discussed with the addition of tobacco. Secondary smoke risks were reviewed with respect to children and newborns. Increases in Sudden Infant Death Syndrome, asthma, respiratory problems, and cancers were reviewed.  Dysmenorrhea 06/27/2013     Priority: High    HPV (human papilloma virus) infection 10/27/2011     Priority: Medium     Laryngeal Papillomatosis counseling and Route reviewed      Ovarian cyst, follicular 43/74/6340     Priority: Low     SMALL CYST. Right side, 4cm, +doppler flow  Tumor markers negative      Bronchitis, acute, with bronchospasm 08/21/2017    Acute exacerbation of chronic low back pain 05/06/2017    Exposure to hepatitis C 04/11/2017    Headache disorder 02/28/2017    Smokes and motivated to quit 02/28/2017    Abnormal tympanic membrane of left ear 12/28/2016    Chronic bilateral low back pain without sciatica 12/04/2016    Hematuria 12/04/2016    Proteinuria 12/04/2016    Fatigue 07/10/2015    IFG (impaired fasting glucose) 04/08/2015    Insomnia 11/19/2014    Cyst of left kidney 10/17/2014     CT : 10/17/2014Hypodense lesion within the left kidney which has previously been . worked up with ultrasound. Please note that the previous ultrasound . report requested followup CT renal mass protocol.       Menopausal hot flushes 10/14/2014    Prediabetes 10/08/2014     A1c 6      Obesity (BMI 30.0-34.9) 10/08/2014     weight loss goal of 5%= 10 pounds in 3-6 mo starting 7/9/15  Wt Readings from Last 3 Encounters:   07/09/15 190 lb (86.183 kg)   04/08/15 199 lb (90.266 kg)   01/06/15 193 lb (87.544 kg)           Chronic neck pain 10/08/2014

## 2017-10-04 ENCOUNTER — TELEPHONE (OUTPATIENT)
Dept: OBGYN CLINIC | Age: 27
End: 2017-10-04

## 2017-10-04 LAB
C TRACH DNA GENITAL QL NAA+PROBE: NEGATIVE
N. GONORRHOEAE DNA: NEGATIVE

## 2017-10-04 RX ORDER — METRONIDAZOLE 500 MG/1
500 TABLET ORAL 2 TIMES DAILY
Qty: 14 TABLET | Refills: 0 | Status: SHIPPED | OUTPATIENT
Start: 2017-10-04 | End: 2017-10-11

## 2017-10-04 NOTE — TELEPHONE ENCOUNTER
----- Message from Bryaden Tolbert CNM sent at 10/4/2017  8:15 AM EDT -----  Flagyl 500 mg # 14 BID po x 7 days

## 2017-10-06 DIAGNOSIS — J20.9 BRONCHITIS, ACUTE, WITH BRONCHOSPASM: ICD-10-CM

## 2017-10-06 RX ORDER — ALBUTEROL SULFATE 90 MCG
HFA AEROSOL WITH ADAPTER (GRAM) INHALATION
Qty: 1 INHALER | Refills: 1 | Status: SHIPPED | OUTPATIENT
Start: 2017-10-06 | End: 2017-12-01 | Stop reason: SDUPTHER

## 2017-10-17 ENCOUNTER — HOSPITAL ENCOUNTER (OUTPATIENT)
Dept: ULTRASOUND IMAGING | Age: 27
Discharge: HOME | End: 2017-10-17
Payer: MEDICARE

## 2017-10-17 DIAGNOSIS — N91.2 AMENORRHEA: ICD-10-CM

## 2017-10-17 DIAGNOSIS — R10.9 ABDOMINAL PAIN, UNSPECIFIED LOCATION: ICD-10-CM

## 2017-10-17 DIAGNOSIS — R10.2 PELVIC PAIN IN FEMALE: ICD-10-CM

## 2017-10-17 PROCEDURE — 76856 US EXAM PELVIC COMPLETE: CPT

## 2017-10-17 PROCEDURE — 76830 TRANSVAGINAL US NON-OB: CPT

## 2017-10-31 ENCOUNTER — TELEPHONE (OUTPATIENT)
Dept: OBGYN CLINIC | Age: 27
End: 2017-10-31

## 2017-11-02 DIAGNOSIS — M54.50 ACUTE EXACERBATION OF CHRONIC LOW BACK PAIN: ICD-10-CM

## 2017-11-02 DIAGNOSIS — G89.29 ACUTE EXACERBATION OF CHRONIC LOW BACK PAIN: ICD-10-CM

## 2017-11-02 RX ORDER — ACETAMINOPHEN 500 MG
TABLET ORAL
Qty: 120 TABLET | Refills: 1 | Status: SHIPPED | OUTPATIENT
Start: 2017-11-02 | End: 2022-06-23 | Stop reason: ALTCHOICE

## 2017-11-09 DIAGNOSIS — G89.29 ACUTE EXACERBATION OF CHRONIC LOW BACK PAIN: ICD-10-CM

## 2017-11-09 DIAGNOSIS — M54.50 ACUTE EXACERBATION OF CHRONIC LOW BACK PAIN: ICD-10-CM

## 2017-11-09 DIAGNOSIS — R07.89 MUSCULOSKELETAL CHEST PAIN: ICD-10-CM

## 2017-11-09 DIAGNOSIS — F17.200 SMOKES AND MOTIVATED TO QUIT: ICD-10-CM

## 2017-11-09 RX ORDER — LIDOCAINE 40 MG/G
CREAM TOPICAL
Qty: 45 G | Refills: 2 | Status: SHIPPED | OUTPATIENT
Start: 2017-11-09 | End: 2018-02-06 | Stop reason: SDUPTHER

## 2017-11-09 RX ORDER — NICOTINE 21 MG/24HR
PATCH, TRANSDERMAL 24 HOURS TRANSDERMAL
Qty: 30 PATCH | Refills: 3 | Status: SHIPPED | OUTPATIENT
Start: 2017-11-09 | End: 2018-02-06 | Stop reason: SDUPTHER

## 2017-11-09 NOTE — TELEPHONE ENCOUNTER
PLEASE APPROVE OR REFUSE. Next Visit Date: Future Appointments  Date Time Provider Cecelia Garner   11/16/2017 12:30 PM Be Julien, [address]   11/30/2017 2:00 PM Gely Stevens MD fp sc [address] Maintenance   Topic Date Due    DTaP/Tdap/Td vaccine (1 - Tdap) 10/19/2008    Flu vaccine (1) 09/01/2017    Pneumococcal med risk  Completed    HIV screen  Completed       Hemoglobin A1C (%)   Date Value   04/09/2015 5.4   10/14/2014 6.0   05/01/2013 5.3             ( goal A1C is < 7)   No results found for: LABMICR  No results found for: LDLCHOLESTEROL, LDLCALC    (goal LDL is <100)   AST (U/L)   Date Value   05/02/2017 16     ALT (U/L)   Date Value   05/02/2017 20     BUN (mg/dL)   Date Value   05/02/2017 11     BP Readings from Last 3 Encounters:   10/03/17 122/70   08/21/17 116/72   05/04/17 101/66          (goal 120/80)    All Future Testing planned in [facility]  Lab Frequency Next Occurrence   Lipid Panel Once 12/25/2017   C. Trachomatis / N. Gonorrhoeae, DNA Once 10/03/2017         Patient Active Problem List:     HPV (human papilloma virus) infection     Dysmenorrhea     Tobacco abuse     Ovarian cyst, follicular     S/P GONZALO, RSO, appendectomy 7/7/14     Bipolar II disorder ([facility] [address]     Anxiety     Prediabetes     Obesity (BMI 30.0-34. 9)     Chronic neck pain     Menopausal hot flushes     Cyst of left kidney     Insomnia     IFG (impaired fasting glucose)     Fatigue     History of total abdominal hysterectomy     Chronic bilateral low back pain without sciatica     Hematuria     Proteinuria     Abnormal tympanic membrane of left ear     Headache disorder     Smokes and motivated to quit     Exposure to hepatitis C     Acute exacerbation of chronic low back pain     Bronchitis, acute, with bronchospasm

## 2017-11-16 ENCOUNTER — OFFICE VISIT (OUTPATIENT)
Dept: OBGYN CLINIC | Age: 27
End: 2017-11-16
Payer: MEDICARE

## 2017-11-16 ENCOUNTER — HOSPITAL ENCOUNTER (OUTPATIENT)
Age: 27
Setting detail: SPECIMEN
Discharge: HOME | End: 2017-11-16
Payer: MEDICARE

## 2017-11-16 VITALS
DIASTOLIC BLOOD PRESSURE: 70 MMHG | SYSTOLIC BLOOD PRESSURE: 116 MMHG | HEIGHT: 67 IN | HEART RATE: 80 BPM | WEIGHT: 191 LBS | BODY MASS INDEX: 29.98 KG/M2 | RESPIRATION RATE: 18 BRPM

## 2017-11-16 DIAGNOSIS — Z01.419 WELL FEMALE EXAM WITH ROUTINE GYNECOLOGICAL EXAM: Primary | ICD-10-CM

## 2017-11-16 PROCEDURE — 99395 PREV VISIT EST AGE 18-39: CPT | Performed by: ADVANCED PRACTICE MIDWIFE

## 2017-11-16 PROCEDURE — G0123 SCREEN CERV/VAG THIN LAYER: HCPCS

## 2017-11-16 ASSESSMENT — PATIENT HEALTH QUESTIONNAIRE - PHQ9
SUM OF ALL RESPONSES TO PHQ QUESTIONS 1-9: 0
1. LITTLE INTEREST OR PLEASURE IN DOING THINGS: 0
2. FEELING DOWN, DEPRESSED OR HOPELESS: 0
SUM OF ALL RESPONSES TO PHQ9 QUESTIONS 1 & 2: 0

## 2017-11-16 NOTE — PROGRESS NOTES
HYSTEROSCOPY  3/5/2012    LAPAROSCOPY  05-28-14    diagnostic w/pelvic washing    LEEP  7/22/13    BRUCE I-II, neg margins, done with ablation    TUBAL LIGATION  2012    Essure, also with a hysteroscopy    WISDOM TOOTH EXTRACTION       Family History   Problem Relation Age of Onset    Diabetes Maternal Grandmother     Heart Disease Maternal Grandmother     Heart Disease Maternal Grandfather     Hypertension Father     Diabetes Mother     Diabetes Sister     Down Syndrome Son     Ovarian Cancer Paternal Grandmother      >70 yo    Breast Cancer Paternal [de-identified]      >49 yo    Urolithiasis Brother      History   Smoking Status    Current Every Day Smoker    Packs/day: 0.50    Years: 9.00    Types: Cigarettes   Smokeless Tobacco    Never Used     History   Alcohol Use    0.0 oz/week     Comment: occasionally       MEDICATIONS:  Current Outpatient Prescriptions   Medication Sig Dispense Refill    lidocaine (LMX) 4 % cream Apply 2-3 times a day as needed for pain 45 g 2    nicotine (NICODERM CQ) 21 MG/24HR Place 1 patch onto the skin every 24 hours 30 patch 3    MAPAP 500 MG tablet TAKE 1 TABLET BY MOUTH EVERY 6 HOURS AS NEEDED FOR PAIN 120 tablet 1    PROVENTIL  (90 Base) MCG/ACT inhaler Inhale 2 puffs into the lungs every 6 hours as needed for Wheezing or Shortness of Breath 1 Inhaler 1    mometasone (NASONEX) 50 MCG/ACT nasal spray INHALE 2 SPRAYS BY NASAL ROUTE DAILY 17 g 5    ranitidine (ZANTAC) 150 MG capsule TAKE 1 TABLET BY MOUTH 2 TIMES DAILY 60 capsule 2    ondansetron (ZOFRAN) 4 MG tablet Take 1 tablet by mouth every 6 hours as needed for Nausea or Vomiting 20 tablet 0    cetirizine (ZYRTEC) 10 MG tablet Take 1 tablet by mouth daily 30 tablet 11     No current facility-administered medications for this visit.         ALLERGIES:  Allergies as of 11/16/2017 - Review Complete 11/16/2017   Allergen Reaction Noted    Levaquin [levofloxacin in d5w]  02/28/2017    Epilepsy, Seizure Hx, or Limb Weakness  Dermatological ROS: No Rash, Itching, Hives, Mole Changes or Cancer                                                                                                                                                                                                                                PHYSICAL Exam:     Constitutional:  Blood pressure 116/70, pulse 80, resp. rate 18, height 5' 7\" (1.702 m), weight 191 lb (86.6 kg), last menstrual period 07/04/2012, not currently breastfeeding. General Appearance: This  is a well Developed, well Nourished, well groomed female. Her BMI was reviewed. Nutritional decision making was discussed. Skin:  There was a Normal Inspection of the skin without rashes or lesions. There were no rashes. (Papular, Maculopapular, Hives, Pustular, Macular)     There were no lesions (Ulcers, Erythema, Abn. Appearing Nevi)            Lymphatic:  No Lymph Nodes were Palpable in the neck , axilla or groin.  0 # Of Lymph Nodes; Location ; Character [Normal]  [Shotty] [Tender] [Enlarged]     Neck and EENT:  The neck was supple. There were no masses   The thyroid was not enlarged and had no masses. Perrla, EOMI B/L, TMI B/L No Abnormalities. Throat inspected-No exudates or Masses, Nares Patent No Curt        Respiratory: The lungs were auscultated and found to be clear. There were no rales, rhonchi or wheezes. There was a good respiratory effort. Cardiovascular: The heart was in a regular rate and rhythm. . No S3 or S4. There was no murmur appreciated. Location, grade, and radiation are not applicable. Extremities: The patients extremities were without calf tenderness, edema, or varicosities. There was full range of motion in all four extremities. Pulses in all four extremities were appreciated and are 2/4. Abdomen: The abdomen was soft and non-tender.  There were good bowel sounds in all quadrants and there was no guarding, rebound

## 2017-11-16 NOTE — PATIENT INSTRUCTIONS
unexpected vaginal bleeding. · You have a fever. · You have new or increased pain in your vagina or pelvis. · You are not getting better after 1 week. · Your symptoms return after you finish the course of your medicine. Where can you learn more? Go to https://tasia.healthCheers [email or web address] org and sign in to your [employer] [place] account. Enter G734 in the TeamLINKS box to learn more about \"Bacterial Vaginosis: Care Instructions. \"     If you do not have an account, please click on the \"Sign Up Now\" link. Current as of: October 13, 2016  Content Version: 11.3  © 2197-6269 [employer]. Care instructions adapted under license by [facility] ([facility]). If you have questions about a medical condition or this instruction, always ask your healthcare professional. [address] any warranty or liability for your use of this information. Patient Education        Breast Self-Exam: Care Instructions  Your Care Instructions  A breast self-exam is when you check your breasts for lumps or changes. This regular exam helps you learn how your breasts normally look and feel. Most breast problems or changes are not because of cancer. Breast self-exam is not a substitute for a mammogram. Having regular breast exams by your doctor and regular mammograms improve your chances of finding any problems with your breasts. Some women set a time each month to do a step-by-step breast self-exam. Other women like a less formal system. They might look at their breasts as they brush their teeth, or feel their breasts once in a while in the shower. If you notice a change in your breast, tell your doctor. Follow-up care is a key part of your treatment and safety. Be sure to make and go to all appointments, and call your doctor if you are having problems. Its also a good idea to know your test results and keep a list of the medicines you take.   How do you do a breast self-exam?  · The best time to examine your breasts is usually one week after your menstrual period begins. Your breasts should not be tender then. If you do not have periods, you might do your exam on a day of the month that is easy to remember. · To examine your breasts:  ¨ Remove all your clothes above the waist and lie down. When you are lying down, your breast tissue spreads evenly over your chest wall, which makes it easier to feel all your breast tissue. ¨ Use the padsnot the fingertipsof the 3 middle fingers of your left hand to check your right breast. Move your fingers slowly in small coin-sized circles that overlap. ¨ Use three levels of pressure to feel of all your breast tissue. Use light pressure to feel the tissue close to the skin surface. Use medium pressure to feel a little deeper. Use firm pressure to feel your tissue close to your breastbone and ribs. Use each pressure level to feel your breast tissue before moving on to the next spot. ¨ Check your entire breast, moving up and down as if following a strip from the collarbone to the bra line, and from the armpit to the ribs. Repeat until you have covered the entire breast.  ¨ Repeat this procedure for your left breast, using the pads of the 3 middle fingers of your right hand. · To examine your breasts while in the shower:  ¨ Place one arm over your head and lightly soap your breast on that side. ¨ Using the pads of your fingers, gently move your hand over your breast (in the strip pattern described above), feeling carefully for any lumps or changes. ¨ Repeat for the other breast.  · Have your doctor inspect anything you notice to see if you need further testing. Where can you learn more? Go to https://[email or web address].[email or web address] org and sign in to your [employer] account. Enter P148 in the [employer] box to learn more about \"Breast Self-Exam: Care Instructions. \"     If you do not have an account, please click on the \"Sign Up Now\" link.   Current as of: July 26,

## 2017-11-26 LAB — CYTOLOGY REPORT: NORMAL

## 2017-11-27 ENCOUNTER — TELEPHONE (OUTPATIENT)
Dept: OBGYN CLINIC | Age: 27
End: 2017-11-27

## 2017-11-27 RX ORDER — METRONIDAZOLE 500 MG/1
500 TABLET ORAL 2 TIMES DAILY
Qty: 14 TABLET | Refills: 0 | Status: SHIPPED | OUTPATIENT
Start: 2017-11-27 | End: 2017-12-04

## 2017-11-29 DIAGNOSIS — K21.9 GASTROESOPHAGEAL REFLUX DISEASE WITHOUT ESOPHAGITIS: ICD-10-CM

## 2017-11-30 RX ORDER — RANITIDINE 150 MG/1
CAPSULE ORAL
Qty: 60 CAPSULE | Refills: 3 | Status: SHIPPED | OUTPATIENT
Start: 2017-11-30 | End: 2019-05-24 | Stop reason: SDUPTHER

## 2017-12-01 ENCOUNTER — OFFICE VISIT (OUTPATIENT)
Dept: FAMILY MEDICINE CLINIC | Age: 27
End: 2017-12-01
Payer: MEDICARE

## 2017-12-01 VITALS
TEMPERATURE: 97.1 F | DIASTOLIC BLOOD PRESSURE: 81 MMHG | HEIGHT: 67 IN | WEIGHT: 193.2 LBS | HEART RATE: 88 BPM | BODY MASS INDEX: 30.32 KG/M2 | SYSTOLIC BLOOD PRESSURE: 120 MMHG | OXYGEN SATURATION: 97 %

## 2017-12-01 DIAGNOSIS — G89.29 ACUTE EXACERBATION OF CHRONIC LOW BACK PAIN: ICD-10-CM

## 2017-12-01 DIAGNOSIS — J20.9 BRONCHITIS, ACUTE, WITH BRONCHOSPASM: ICD-10-CM

## 2017-12-01 DIAGNOSIS — J01.00 ACUTE NON-RECURRENT MAXILLARY SINUSITIS: ICD-10-CM

## 2017-12-01 DIAGNOSIS — E66.9 OBESITY (BMI 30.0-34.9): ICD-10-CM

## 2017-12-01 DIAGNOSIS — M54.50 ACUTE EXACERBATION OF CHRONIC LOW BACK PAIN: ICD-10-CM

## 2017-12-01 DIAGNOSIS — J20.9 ACUTE BRONCHITIS DUE TO INFECTION: Primary | ICD-10-CM

## 2017-12-01 DIAGNOSIS — F17.200 SMOKER: ICD-10-CM

## 2017-12-01 PROCEDURE — G8417 CALC BMI ABV UP PARAM F/U: HCPCS | Performed by: FAMILY MEDICINE

## 2017-12-01 PROCEDURE — 4004F PT TOBACCO SCREEN RCVD TLK: CPT | Performed by: FAMILY MEDICINE

## 2017-12-01 PROCEDURE — G8484 FLU IMMUNIZE NO ADMIN: HCPCS | Performed by: FAMILY MEDICINE

## 2017-12-01 PROCEDURE — 99214 OFFICE O/P EST MOD 30 MIN: CPT | Performed by: FAMILY MEDICINE

## 2017-12-01 PROCEDURE — G8427 DOCREV CUR MEDS BY ELIG CLIN: HCPCS | Performed by: FAMILY MEDICINE

## 2017-12-01 RX ORDER — NICOTINE POLACRILEX 4 MG/1
GUM, CHEWING ORAL
Qty: 60 TABLET | Refills: 3 | Status: SHIPPED | OUTPATIENT
Start: 2017-12-01 | End: 2018-03-14 | Stop reason: SDUPTHER

## 2017-12-01 RX ORDER — AMOXICILLIN AND CLAVULANATE POTASSIUM 875; 125 MG/1; MG/1
1 TABLET, FILM COATED ORAL 2 TIMES DAILY
Qty: 20 TABLET | Refills: 0 | Status: SHIPPED | OUTPATIENT
Start: 2017-12-01 | End: 2017-12-11

## 2017-12-01 RX ORDER — GUAIFENESIN AND CODEINE PHOSPHATE 100; 10 MG/5ML; MG/5ML
5 SOLUTION ORAL 4 TIMES DAILY PRN
Qty: 140 ML | Refills: 0 | Status: SHIPPED | OUTPATIENT
Start: 2017-12-01 | End: 2017-12-08

## 2017-12-01 RX ORDER — ALBUTEROL SULFATE 90 MCG
HFA AEROSOL WITH ADAPTER (GRAM) INHALATION
Qty: 1 INHALER | Refills: 3 | Status: SHIPPED | OUTPATIENT
Start: 2017-12-01 | End: 2018-03-05 | Stop reason: SDUPTHER

## 2017-12-01 ASSESSMENT — ENCOUNTER SYMPTOMS
CHEST TIGHTNESS: 1
BACK PAIN: 1
CONSTIPATION: 0
DIARRHEA: 0
COUGH: 1
NAUSEA: 0
WHEEZING: 0
RHINORRHEA: 1
SHORTNESS OF BREATH: 0
ABDOMINAL PAIN: 0
SINUS PAIN: 1
VOMITING: 0
ABDOMINAL DISTENTION: 0

## 2017-12-01 NOTE — PATIENT INSTRUCTIONS
Patient Education        Bronchitis: Care Instructions  Your Care Instructions    Bronchitis is inflammation of the bronchial tubes, which carry air to the lungs. The tubes swell and produce mucus, or phlegm. The mucus and inflamed bronchial tubes make you cough. You may have trouble breathing. Most cases of bronchitis are caused by viruses like those that cause colds. Antibiotics usually do not help and they may be harmful. Bronchitis usually develops rapidly and lasts about 2 to 3 weeks in otherwise healthy people. Follow-up care is a key part of your treatment and safety. Be sure to make and go to all appointments, and call your doctor if you are having problems. It's also a good idea to know your test results and keep a list of the medicines you take. How can you care for yourself at home? · Take all medicines exactly as prescribed. Call your doctor if you think you are having a problem with your medicine. · Get some extra rest.  · Take an over-the-counter pain medicine, such as acetaminophen (Tylenol), ibuprofen (Advil, Motrin), or naproxen (Aleve) to reduce fever and relieve body aches. Read and follow all instructions on the label. · Do not take two or more pain medicines at the same time unless the doctor told you to. Many pain medicines have acetaminophen, which is Tylenol. Too much acetaminophen (Tylenol) can be harmful. · Take an over-the-counter cough medicine that contains dextromethorphan to help quiet a dry, hacking cough so that you can sleep. Avoid cough medicines that have more than one active ingredient. Read and follow all instructions on the label. · Breathe moist air from a humidifier, hot shower, or sink filled with hot water. The heat and moisture will thin mucus so you can cough it out. · Do not smoke. Smoking can make bronchitis worse. If you need help quitting, talk to your doctor about stop-smoking programs and medicines.  These can increase your chances of quitting for good.  When should you call for help? Call 911 anytime you think you may need emergency care. For example, call if:  · You have severe trouble breathing. Call your doctor now or seek immediate medical care if:  · You have new or worse trouble breathing. · You cough up dark brown or bloody mucus (sputum). · You have a new or higher fever. · You have a new rash. Watch closely for changes in your health, and be sure to contact your doctor if:  · You cough more deeply or more often, especially if you notice more mucus or a change in the color of your mucus. · You are not getting better as expected. Where can you learn more? Go to https://BioConsortia.Rapid RMS. org and sign in to your SponsorHub account. Enter H333 in the Storactive box to learn more about \"Bronchitis: Care Instructions. \"     If you do not have an account, please click on the \"Sign Up Now\" link. Current as of: March 25, 2017  Content Version: 11.3  © 6954-4742 [employer]. Care instructions adapted under license by [facility] ([facility]). If you have questions about a medical condition or this instruction, always ask your healthcare professional. [place] [address] any warranty or liability for your use of this information. Patient Education        Learning About Benefits From Quitting Smoking  How does quitting smoking make you healthier? If you're thinking about quitting smoking, you may have a few reasons to be smoke-free. Your health may be one of them. · When you quit smoking, you lower your risks for cancer, lung disease, heart attack, stroke, blood vessel disease, and blindness from macular degeneration. · When you're smoke-free, you get sick less often, and you heal faster. You are less likely to get colds, flu, bronchitis, and pneumonia. · As a nonsmoker, you may find that your mood is better and you are less stressed. When and how will you feel healthier?   Quitting has real health benefits that start from day 1 of being smoke-free. And the longer you stay smoke-free, the healthier you get and the better you feel. The first hours  · After just 20 minutes, your blood pressure and heart rate go down. That means there's less stress on your heart and blood vessels. · Within 12 hours, the level of carbon monoxide in your blood drops back to normal. That makes room for more oxygen. With more oxygen in your body, you may notice that you have more energy than when you smoked. After 2 weeks  · Your lungs start to work better. · Your risk of heart attack starts to drop. After 1 month  · When your lungs are clear, you cough less and breathe deeper, so it's easier to be active. · Your sense of taste and smell return. That means you can enjoy food more than you have since you started smoking. Over the years  · After 1 year, your risk of heart disease is half what it would be if you kept smoking. · After 5 years, your risk of stroke starts to shrink. Within a few years after that, it's about the same as if you'd never smoked. · After 10 years, your risk of dying from lung cancer is cut by about half. And your risk for many other types of cancer is lower too. How would quitting help others in your life? When you quit smoking, you improve the health of everyone who now breathes in your smoke. · Their heart, lung, and cancer risks drop, much like yours. · They are sick less. For babies and small children, living smoke-free means they're less likely to have ear infections, pneumonia, and bronchitis. · If you're a woman who is or will be pregnant someday, quitting smoking means a healthier . · Children who are close to you are less likely to become adult smokers. Where can you learn more? Go to https://tasia.healthAdwo Media Holdingspartners. org and sign in to your AREVS account. Enter [id number] in the Jaree box to learn more about \"Learning About Benefits From Quitting Smoking. \" medicines. They do not contain nicotine. They help you by reducing withdrawal symptoms, such as stress and anxiety. · Some people find hypnosis, acupuncture, and massage helpful for ending the smoking habit. · Eat a healthy diet and get regular exercise. Having healthy habits will help your body move past its craving for nicotine. · Be prepared to keep trying. Most people are not successful the first few times they try to quit. Do not get mad at yourself if you smoke again. Make a list of things you learned and think about when you want to try again, such as next week, next month, or next year. Where can you learn more? Go to https://FaceRigpeQuack.FinanceAcar. org and sign in to your GrouPAY account. Enter C759 in the DGTS box to learn more about \"Stopping Smoking: Care Instructions. \"     If you do not have an account, please click on the \"Sign Up Now\" link. Current as of: March 20, 2017  Content Version: 11.3  © 3069-6358 [employer]. Care instructions adapted under license by [facility] ([facility]). If you have questions about a medical condition or this instruction, always ask your healthcare professional. [place] [address] any warranty or liability for your use of this information.

## 2017-12-01 NOTE — PROGRESS NOTES
or Shortness of Breath 1 Inhaler 1    mometasone (NASONEX) 50 MCG/ACT nasal spray INHALE 2 SPRAYS BY NASAL ROUTE DAILY 17 g 5    ondansetron (ZOFRAN) 4 MG tablet Take 1 tablet by mouth every 6 hours as needed for Nausea or Vomiting 20 tablet 0    cetirizine (ZYRTEC) 10 MG tablet Take 1 tablet by mouth daily 30 tablet 11     No current facility-administered medications for this visit. Past Medical History:   Diagnosis Date    Anxiety     ASCUS (atypical squamous cells of undetermined significance) on Pap smear 8/1/07, 1/26/10, 6/13/11, 7/10/12, 4/24/13    Bipolar II disorder (HCC)     Cervical high risk HPV (human papillomavirus) test positive 1/26/10, 6/13/11, 7/10/12, 4/24/13    Chronic pelvic pain in female     BRUCE II (cervical intraepithelial neoplasia II) 7/22/13    on LEEP    Cyst of left kidney     Depression     Hernia     History of total abdominal hysterectomy 7/7/14    RSO    Intractable headache r/o meningitis 10/16/2014    Obesity (BMI 30.0-34.9) 10/8/2014    Ovarian cyst rupture     was hospitalized for infection 2014 (April?)    Ovarian cyst, bilateral     PTSD (post-traumatic stress disorder)     Tobacco abuse     Tobacco abuse     Urinary retention with incomplete bladder emptying     HX    Wears glasses     night vision issues          Social History     Social History    Marital status:      Spouse name: N/A    Number of children: N/A    Years of education: N/A     Occupational History    Not on file.      Social History Main Topics    Smoking status: Current Every Day Smoker     Packs/day: 0.50     Years: 9.00     Types: Cigarettes    Smokeless tobacco: Never Used    Alcohol use 0.0 oz/week      Comment: occasionally    Drug use: No    Sexual activity: Yes     Partners: Male     Birth control/ protection: Surgical      Comment: hyst     Other Topics Concern    Not on file     Social History Narrative    She has 3 children: 1 has short stature and 1 has amoxicillin-clavulanate (AUGMENTIN) 875-125 MG per tablet; Take 1 tablet by mouth 2 times daily for 10 days  Dispense: 20 tablet; Refill: 0  - guaiFENesin-codeine (TUSSI-ORGANIDIN NR) 100-10 MG/5ML syrup; Take 5 mLs by mouth 4 times daily as needed for Cough or Congestion . Dispense: 140 mL; Refill: 0    3. Smoker  Patient was counseled on tobacco cessation. Based upon patient's motivation to change her behavior, the following plan was agreed upon: patient will think about his/her triggers for using tobacco, patient will avoid triggers and negative influences, patient will set a quit date: NOW as she is sick and patient will try the following tobacco cessation strategies:  nicotine replacement: patches, risks and benefits of this medication discussed- patient will call for any significant adverse effects. If she fails to quit smoking at this time due to her  still smoking, she tells me she will try to quit smoking with her  at [place]=quit date! She was provided with a list of local tobacco cessation resources. Provider spent 10 minutes counseling patient. 4. Obesity (BMI 30.0-34. 9)  Worsening  Labs in 3 mo      - Lipid Panel; Future  - TSH without Reflex; Future  - Comprehensive Metabolic Panel; Future    Patient was asked about her current diet and exercise habits, and personalized advice was provided regarding recommended lifestyle changes. Patient's comorbid health conditions associated with elevated BMI were discussed, including mood disorder, as well as the likely benefits of weight loss. Based upon patient's motivation to change her behavior, the following plan was agreed upon to work toward a weight loss goal of 0.5-1 pounds/week: low carbohydrate diet, wear a pedometer and get at least 10,000 steps per day OR exercise for at least 30 minutes 4-5 days per week. Educational materials for  weight loss were provided. Patient will follow-up in 3 month(s) with PCP.   Provider spent 10 minutes counseling patient. 5. Acute exacerbation of chronic low back pain  Continue PT, will have MRI per work comp  Lidocaine cream, NSAIDs, muscle relaxants. Orders Placed This Encounter   Procedures    Lipid Panel     Standing Status:   Future     Standing Expiration Date:   12/1/2018     Order Specific Question:   Is Patient Fasting?/# of Hours     Answer:   8-10 Hours    TSH without Reflex     Standing Status:   Future     Standing Expiration Date:   12/2/2018    Comprehensive Metabolic Panel     Standing Status:   Future     Standing Expiration Date:   12/1/2018         There are no discontinued medications. Natasha received counseling on the following healthy behaviors: nutrition, exercise, medication adherence, tobacco cessation and weight loss  Reviewed prior labs and health maintenance  Continue current medications, diet and exercise. Discussed use, benefit, and side effects of prescribed medications. Barriers to medication compliance addressed. Patient given educational materials - see patient instructions  Was a self-tracking handout given in paper form or via [email or web address]? No:    Requested Prescriptions     Signed Prescriptions Disp Refills    amoxicillin-clavulanate (AUGMENTIN) 875-125 MG per tablet 20 tablet 0     Sig: Take 1 tablet by mouth 2 times daily for 10 days    guaiFENesin-codeine (TUSSI-ORGANIDIN NR) 100-10 MG/5ML syrup 140 mL 0     Sig: Take 5 mLs by mouth 4 times daily as needed for Cough or Congestion . All patient questions answered. Patient voiced understanding. Quality Measures    Body mass index is 30.26 kg/m². Elevated. Weight control planned discussed conventional weight loss and Healthy diet and regular exercise. BP: 120/81 Blood pressure is normal. Treatment plan consists of No treatment change needed.     No results found for: LDLCALC, LDLCHOLESTEROL, LDLDIRECT (go  al LDL reduction with dx if diabetes is 50% LDL reduction)      Negative

## 2017-12-01 NOTE — PROGRESS NOTES
Visit Information    Have you changed or started any medications since your last visit including any over-the-counter medicines, vitamins, or herbal medicines? no   Have you stopped taking any of your medications? Is so, why? -  no  Are you having any side effects from any of your medications? - no    Have you seen any other physician or provider since your last visit? yes - [facility]   Have you had any other diagnostic tests since your last visit?  no   Have you been seen in the emergency room and/or had an admission in a hospital since we last saw you?  no   Have you had your routine dental cleaning in the past 6 months?  no     Do you have an active MyChart account? If no, what is the barrier?   Yes    Patient Care Team:  Luz Clay MD as PCP - [address] [place] [address],  as Consulting Physician (Obstetrics & Gynecology)  Vy Le MD as Consulting Physician (Urology)    Medical History Review  Past Medical, Family, and Social History reviewed and does contribute to the patient presenting condition    Health Maintenance   Topic Date Due    DTaP/Tdap/Td vaccine (1 - Tdap) 10/19/2008    Flu vaccine (1) 09/01/2017    Pneumococcal med risk  Completed    HIV screen  Completed

## 2017-12-01 NOTE — TELEPHONE ENCOUNTER
Please Approve or Refuse. Next Visit Date:  Visit date not found    Hemoglobin A1C (%)   Date Value   04/09/2015 5.4   10/14/2014 6.0   05/01/2013 5.3             ( goal A1C is < 7)   No results found for: LABMICR  No results found for: LDLCHOLESTEROL, LDLCALC    (goal LDL is <100)   AST (U/L)   Date Value   05/02/2017 16     ALT (U/L)   Date Value   05/02/2017 20     BUN (mg/dL)   Date Value   05/02/2017 11     BP Readings from Last 3 Encounters:   12/01/17 120/81   11/16/17 116/70   10/03/17 122/70          (goal 120/80)        Patient Active Problem List:     HPV (human papilloma virus) infection     Dysmenorrhea     Tobacco abuse     Ovarian cyst, follicular     S/P GONZALO, RSO, appendectomy 7/7/14     Bipolar II disorder ([facility] [address]     Anxiety     Prediabetes     Obesity (BMI 30.0-34. 9)     Chronic neck pain     Menopausal hot flushes     Cyst of left kidney     Insomnia     IFG (impaired fasting glucose)     Fatigue     History of total abdominal hysterectomy     Chronic bilateral low back pain without sciatica     Hematuria     Proteinuria     Abnormal tympanic membrane of left ear     Headache disorder     Smokes and motivated to quit     Exposure to hepatitis C     Acute exacerbation of chronic low back pain     Bronchitis, acute, with bronchospasm

## 2017-12-03 PROBLEM — J01.00 ACUTE NON-RECURRENT MAXILLARY SINUSITIS: Status: ACTIVE | Noted: 2017-12-03

## 2017-12-03 ASSESSMENT — ENCOUNTER SYMPTOMS
SINUS PRESSURE: 1
TROUBLE SWALLOWING: 0

## 2017-12-14 DIAGNOSIS — J30.2 SEASONAL ALLERGIC RHINITIS: ICD-10-CM

## 2017-12-15 RX ORDER — CETIRIZINE HYDROCHLORIDE 10 MG/1
10 TABLET ORAL DAILY
Qty: 90 TABLET | Refills: 3 | Status: SHIPPED | OUTPATIENT
Start: 2017-12-15 | End: 2018-10-30 | Stop reason: SDUPTHER

## 2018-02-06 DIAGNOSIS — M54.50 ACUTE EXACERBATION OF CHRONIC LOW BACK PAIN: ICD-10-CM

## 2018-02-06 DIAGNOSIS — R07.89 MUSCULOSKELETAL CHEST PAIN: ICD-10-CM

## 2018-02-06 DIAGNOSIS — F17.200 SMOKES AND MOTIVATED TO QUIT: ICD-10-CM

## 2018-02-06 DIAGNOSIS — G89.29 ACUTE EXACERBATION OF CHRONIC LOW BACK PAIN: ICD-10-CM

## 2018-02-06 RX ORDER — NICOTINE 21 MG/24HR
PATCH, TRANSDERMAL 24 HOURS TRANSDERMAL
Qty: 30 PATCH | Refills: 1 | Status: SHIPPED | OUTPATIENT
Start: 2018-02-06 | End: 2018-10-30

## 2018-02-06 RX ORDER — LIDOCAINE 40 MG/G
CREAM TOPICAL
Qty: 45 G | Refills: 5 | Status: SHIPPED | OUTPATIENT
Start: 2018-02-06 | End: 2019-01-18 | Stop reason: ALTCHOICE

## 2018-02-06 NOTE — TELEPHONE ENCOUNTER
Please Approve or Refuse. Next Visit Date:  3/9/2018    Hemoglobin A1C (%)   Date Value   04/09/2015 5.4   10/14/2014 6.0   05/01/2013 5.3             ( goal A1C is < 7)   No results found for: LABMICR  No results found for: LDLCHOLESTEROL, LDLCALC    (goal LDL is <100)   AST (U/L)   Date Value   05/02/2017 16     ALT (U/L)   Date Value   05/02/2017 20     BUN (mg/dL)   Date Value   05/02/2017 11     BP Readings from Last 3 Encounters:   12/01/17 120/81   11/16/17 116/70   10/03/17 122/70          (goal 120/80)        Patient Active Problem List:     HPV (human papilloma virus) infection     Dysmenorrhea     Smoker     Ovarian cyst, follicular     S/P GONZALO, [facility], appendectomy 7/7/14     Bipolar II disorder ([address]     Anxiety     Prediabetes     Obesity (BMI 30.0-34. 9)     Chronic neck pain     Menopausal hot flushes     Acute bronchitis due to infection     Cyst of left kidney     Insomnia     IFG (impaired fasting glucose)     Fatigue     History of total abdominal hysterectomy     Chronic bilateral low back pain without sciatica     Hematuria     Proteinuria     Abnormal tympanic membrane of left ear     Headache disorder     Smokes and motivated to quit     Exposure to hepatitis C     Acute exacerbation of chronic low back pain     Bronchitis, acute, with bronchospasm     Acute non-recurrent maxillary sinusitis

## 2018-03-05 DIAGNOSIS — J20.9 BRONCHITIS, ACUTE, WITH BRONCHOSPASM: ICD-10-CM

## 2018-03-05 RX ORDER — ALBUTEROL SULFATE 90 MCG
HFA AEROSOL WITH ADAPTER (GRAM) INHALATION
Qty: 1 INHALER | Refills: 3 | Status: SHIPPED | OUTPATIENT
Start: 2018-03-05 | End: 2018-06-05 | Stop reason: SDUPTHER

## 2018-03-12 ENCOUNTER — HOSPITAL ENCOUNTER (EMERGENCY)
Age: 28
Discharge: HOME | End: 2018-03-12
Attending: EMERGENCY MEDICINE
Payer: MEDICARE

## 2018-03-12 VITALS
HEIGHT: 65 IN | WEIGHT: 184 LBS | RESPIRATION RATE: 18 BRPM | OXYGEN SATURATION: 98 % | HEART RATE: 92 BPM | DIASTOLIC BLOOD PRESSURE: 91 MMHG | TEMPERATURE: 97.6 F | BODY MASS INDEX: 30.66 KG/M2 | SYSTOLIC BLOOD PRESSURE: 141 MMHG

## 2018-03-12 DIAGNOSIS — M54.31 SCIATICA OF RIGHT SIDE: Primary | ICD-10-CM

## 2018-03-12 PROCEDURE — 6370000000 HC RX 637 (ALT 250 FOR IP): Performed by: PHYSICIAN ASSISTANT

## 2018-03-12 PROCEDURE — 6360000002 HC RX W HCPCS: Performed by: PHYSICIAN ASSISTANT

## 2018-03-12 PROCEDURE — 99283 EMERGENCY DEPT VISIT LOW MDM: CPT

## 2018-03-12 PROCEDURE — 96372 THER/PROPH/DIAG INJ SC/IM: CPT

## 2018-03-12 RX ORDER — CYCLOBENZAPRINE HCL 10 MG
10 TABLET ORAL 2 TIMES DAILY PRN
Qty: 10 TABLET | Refills: 0 | Status: SHIPPED | OUTPATIENT
Start: 2018-03-12 | End: 2018-03-22

## 2018-03-12 RX ORDER — PREDNISONE 20 MG/1
40 TABLET ORAL DAILY
Qty: 10 TABLET | Refills: 0 | Status: SHIPPED | OUTPATIENT
Start: 2018-03-12 | End: 2018-03-17

## 2018-03-12 RX ORDER — ORPHENADRINE CITRATE 30 MG/ML
60 INJECTION INTRAMUSCULAR; INTRAVENOUS ONCE
Status: COMPLETED | OUTPATIENT
Start: 2018-03-12 | End: 2018-03-12

## 2018-03-12 RX ORDER — PREDNISONE 20 MG/1
40 TABLET ORAL ONCE
Status: COMPLETED | OUTPATIENT
Start: 2018-03-12 | End: 2018-03-12

## 2018-03-12 RX ORDER — KETOROLAC TROMETHAMINE 30 MG/ML
60 INJECTION, SOLUTION INTRAMUSCULAR; INTRAVENOUS ONCE
Status: COMPLETED | OUTPATIENT
Start: 2018-03-12 | End: 2018-03-12

## 2018-03-12 RX ADMIN — KETOROLAC TROMETHAMINE 60 MG: 30 INJECTION, SOLUTION INTRAMUSCULAR; INTRAVENOUS at 17:06

## 2018-03-12 RX ADMIN — ORPHENADRINE CITRATE 60 MG: 30 INJECTION INTRAMUSCULAR; INTRAVENOUS at 17:08

## 2018-03-12 RX ADMIN — PREDNISONE 40 MG: 20 TABLET ORAL at 17:05

## 2018-03-12 ASSESSMENT — PAIN SCALES - GENERAL
PAINLEVEL_OUTOF10: 8
PAINLEVEL_OUTOF10: 8

## 2018-03-12 ASSESSMENT — PAIN DESCRIPTION - LOCATION: LOCATION: BACK

## 2018-03-12 ASSESSMENT — ENCOUNTER SYMPTOMS
BOWEL INCONTINENCE: 0
BACK PAIN: 1

## 2018-03-12 NOTE — ED PROVIDER NOTES
[address] ED  eMERGENCY dEPARTMENT eNCOUnter   Independent Attestation     Pt Name: Jody Barnes  MRN: [id number]  Camillegfgino 1990  Date of evaluation: 3/12/18       Jody Barnes is a 28 y.o. female who presents with Back Pain (work injury in Nov)      Vitals:   Vitals:    03/12/18 1629   BP: (!) 141/91   Pulse: 92   Resp: (!) 98   Temp: 97.6 °F (36.4 °C)   TempSrc: Oral   SpO2: 98%   Weight: 184 lb (83.5 kg)   Height: 5' 5\" (1.651 m)       Impression:   1. Sciatica of right side          Based on the medical record, the care appears appropriate. I was personally available for consultation in the Emergency Department.     Rula Kang MD  Attending Emergency  Physician               Rula Kang MD  03/12/18 [id number]

## 2018-03-12 NOTE — ED PROVIDER NOTES
grandmother is alive. She indicated that her maternal grandfather is . She indicated that her paternal grandmother is . She indicated that her paternal grandfather is . She indicated that her daughter is alive. She indicated that her son is alive. SOCIAL HISTORY      reports that she has been smoking Cigarettes. She has a 4.50 pack-year smoking history. She has never used smokeless tobacco. She reports that she drinks alcohol. She reports that she does not use drugs. PHYSICAL EXAM     INITIAL VITALS: BP (!) 141/91   Pulse 92   Temp 97.6 °F (36.4 °C) (Oral)   Resp (!) 98   Ht 5' 5\" (1.651 m)   Wt 184 lb (83.5 kg)   LMP 2012 (Approximate)   SpO2 98%   BMI 30.62 kg/m²      Physical Exam   Constitutional: She is oriented to person, place, and time. She appears well-developed. No distress. HENT:   Head: Normocephalic. Right Ear: Hearing, tympanic membrane, external ear and ear canal normal.   Left Ear: Hearing, tympanic membrane, external ear and ear canal normal.   Mouth/Throat: Uvula is midline, oropharynx is clear and moist and mucous membranes are normal. No oropharyngeal exudate, posterior oropharyngeal edema, posterior oropharyngeal erythema or tonsillar abscesses. Neck: Normal range of motion. Cardiovascular: Normal rate, regular rhythm and normal heart sounds. Pulmonary/Chest: Effort normal. No respiratory distress. Abdominal: Soft. Musculoskeletal: Normal range of motion. She exhibits no edema or tenderness. Lumbar back: She exhibits pain. She exhibits normal range of motion, no tenderness, no bony tenderness, no swelling and no spasm. Back:    Strength 5 out of 5 distally deep tendon reflexes are 2+ sensation is intact distally peripheral pulses are palpable brisk capillary refill skin is warm and dry. pain with straight leg raise on the right at approximately 35°   Lymphadenopathy:     She has no cervical adenopathy. Neurological: She is alert and oriented to person, place, and time. She has normal reflexes. She displays normal reflexes. She exhibits normal muscle tone. Coordination normal.   Skin: Skin is warm and dry. She is not diaphoretic. Nursing note and vitals reviewed. MEDICAL DECISION MAKING:         DIAGNOSTIC RESULTS     EKG: All EKG's are interpreted by the Emergency Department Physician who either signs or Co-signs this chart in the absence of a cardiologist.        RADIOLOGY:All plain film, CT, MRI, and formal ultrasound images (except ED bedside ultrasound) are read by the radiologist and the images and interpretations are directly viewed by the emergency physician. LABS: All lab results were reviewed by myself, and all abnormals are listed below. Labs Reviewed - No data to display      EMERGENCY DEPARTMENT COURSE:   Vitals:    Vitals:    03/12/18 1629   BP: (!) 141/91   Pulse: 92   Resp: (!) 98   Temp: 97.6 °F (36.4 °C)   TempSrc: Oral   SpO2: 98%   Weight: 184 lb (83.5 kg)   Height: 5' 5\" (1.651 m)       The patient was given the following medications while in the emergency department:  Orders Placed This Encounter   Medications    ketorolac (TORADOL) injection 60 mg    orphenadrine (NORFLEX) injection 60 mg    predniSONE (DELTASONE) tablet 40 mg    predniSONE (DELTASONE) 20 MG tablet     Sig: Take 2 tablets by mouth daily for 5 days     Dispense:  10 tablet     Refill:  0    cyclobenzaprine (FLEXERIL) 10 MG tablet     Sig: Take 1 tablet by mouth 2 times daily as needed for Muscle spasms     Dispense:  10 tablet     Refill:  0       -------------------------      CRITICAL CARE:    CONSULTS:  None    PROCEDURES:  Procedures    FINAL IMPRESSION      1.  Sciatica of right side          DISPOSITION/PLAN   DISPOSITION Decision To Discharge 03/12/2018 04:48:49 PM      PATIENT REFERRED TO:  Alvaro Lares MD  [address] [place] [id number]  [phone]    Schedule an

## 2018-03-14 ENCOUNTER — OFFICE VISIT (OUTPATIENT)
Dept: FAMILY MEDICINE CLINIC | Age: 28
End: 2018-03-14
Payer: MEDICARE

## 2018-03-14 VITALS
SYSTOLIC BLOOD PRESSURE: 104 MMHG | DIASTOLIC BLOOD PRESSURE: 80 MMHG | HEIGHT: 65 IN | OXYGEN SATURATION: 98 % | WEIGHT: 189.8 LBS | BODY MASS INDEX: 31.62 KG/M2 | RESPIRATION RATE: 20 BRPM | TEMPERATURE: 96.9 F

## 2018-03-14 DIAGNOSIS — M54.50 ACUTE EXACERBATION OF CHRONIC LOW BACK PAIN: Primary | ICD-10-CM

## 2018-03-14 DIAGNOSIS — M51.26 LUMBAR DISC HERNIATION: ICD-10-CM

## 2018-03-14 DIAGNOSIS — R73.03 PREDIABETES: ICD-10-CM

## 2018-03-14 DIAGNOSIS — G89.29 ACUTE EXACERBATION OF CHRONIC LOW BACK PAIN: Primary | ICD-10-CM

## 2018-03-14 DIAGNOSIS — M54.16 LUMBAR RADICULOPATHY: ICD-10-CM

## 2018-03-14 PROBLEM — J20.9 BRONCHITIS, ACUTE, WITH BRONCHOSPASM: Status: RESOLVED | Noted: 2017-08-21 | Resolved: 2018-03-14

## 2018-03-14 PROBLEM — J01.00 ACUTE NON-RECURRENT MAXILLARY SINUSITIS: Status: RESOLVED | Noted: 2017-12-03 | Resolved: 2018-03-14

## 2018-03-14 PROCEDURE — 4004F PT TOBACCO SCREEN RCVD TLK: CPT | Performed by: FAMILY MEDICINE

## 2018-03-14 PROCEDURE — G8484 FLU IMMUNIZE NO ADMIN: HCPCS | Performed by: FAMILY MEDICINE

## 2018-03-14 PROCEDURE — G8417 CALC BMI ABV UP PARAM F/U: HCPCS | Performed by: FAMILY MEDICINE

## 2018-03-14 PROCEDURE — 99214 OFFICE O/P EST MOD 30 MIN: CPT | Performed by: FAMILY MEDICINE

## 2018-03-14 PROCEDURE — G8427 DOCREV CUR MEDS BY ELIG CLIN: HCPCS | Performed by: FAMILY MEDICINE

## 2018-03-14 RX ORDER — LIDOCAINE 50 MG/G
1 PATCH TOPICAL DAILY
Qty: 30 PATCH | Refills: 0 | Status: SHIPPED | OUTPATIENT
Start: 2018-03-14 | End: 2018-04-11 | Stop reason: SDUPTHER

## 2018-03-14 RX ORDER — DULOXETIN HYDROCHLORIDE 30 MG/1
30 CAPSULE, DELAYED RELEASE ORAL DAILY
Qty: 30 CAPSULE | Refills: 3 | Status: SHIPPED | OUTPATIENT
Start: 2018-03-14 | End: 2018-10-30

## 2018-03-14 RX ORDER — GABAPENTIN 600 MG/1
600 TABLET ORAL 3 TIMES DAILY
COMMUNITY
End: 2018-10-30

## 2018-03-14 RX ORDER — LIDOCAINE 50 MG/G
PATCH TOPICAL
Qty: 30 PATCH | OUTPATIENT
Start: 2018-03-14

## 2018-03-14 ASSESSMENT — ENCOUNTER SYMPTOMS
DIARRHEA: 0
EYE REDNESS: 0
WHEEZING: 0
CONSTIPATION: 0
SINUS PRESSURE: 0
SHORTNESS OF BREATH: 0
BACK PAIN: 1
PHOTOPHOBIA: 0
RHINORRHEA: 0
NAUSEA: 0
ABDOMINAL PAIN: 0
COUGH: 0
SINUS PAIN: 0

## 2018-03-14 NOTE — PROGRESS NOTES
Chief Complaint   Patient presents with    Back Pain     er  3/12/2018         Yaz Hastings Etts  here today for follow up on chronic medical problems, go over labs and/or diagnostic studies, and medication refills. Back Pain (er  3/12/2018)      HPI:Patient is here for ER follow-up, was seen in the ER on March/12 for her low back pain, which has started in November/17 due to injury at work. Patient follows with [employer]. She had physical therapy done and recently had MRI done which showed lumbar disc herniation, results not in Epic. Patient has been referred to pain management and she needs to get epidural injections received but C9 form has not been approved and they cannot start injections. Patient reports she has flareups of this pain on and off and she is not able to go work. She cannot miss work due to this pain. Patient was referred here for pain management start epidural injections. She received oral steroids in the ER and is also taking a muscle relaxant only during night and Neurontin and NSAIDs as needed. /80 Comment: manual  Temp 96.9 °F (36.1 °C) (Oral)   Resp 20   Ht 5' 5\" (1.651 m)   Wt 189 lb 12.8 oz (86.1 kg)   LMP 07/04/2012 (Approximate)   SpO2 98%   BMI 31.58 kg/m²   Body mass index is 31.58 kg/m². Wt Readings from Last 3 Encounters:   03/14/18 189 lb 12.8 oz (86.1 kg)   03/12/18 184 lb (83.5 kg)   12/01/17 193 lb 3.2 oz (87.6 kg)        [x]Negative depression screening. PHQ Scores 11/16/2017 10/3/2017 2/28/2017 12/28/2016   PHQ2 Score 0 0 0 1   PHQ9 Score 0 0 0 1      []1-4 = Minimal depression   []5-9 = Mild depression   []10-14 = Moderate depression   []15-19 = Moderately severe depression   []20-27 = Severe depression    Discussed testing with the patient and all questions fully answered.     Hospital Outpatient Visit on 11/16/2017   Component Date Value Ref Range Status    Cytology Report 11/16/2017    Final results found for: VITD25          Current Outpatient Prescriptions   Medication Sig Dispense Refill    gabapentin (NEURONTIN) 600 MG tablet Take 600 mg by mouth 3 times daily.  DULoxetine (CYMBALTA) 30 MG extended release capsule Take 1 capsule by mouth daily 30 capsule 3    diclofenac (FLECTOR) 1.3 % patch Place 1 patch onto the skin 2 times daily 60 patch 0    predniSONE (DELTASONE) 20 MG tablet Take 2 tablets by mouth daily for 5 days 10 tablet 0    cyclobenzaprine (FLEXERIL) 10 MG tablet Take 1 tablet by mouth 2 times daily as needed for Muscle spasms 10 tablet 0    PROVENTIL  (90 Base) MCG/ACT inhaler Inhale 2 puffs into the lungs every 6 hours as needed for Wheezing or Shortness of Breath 1 Inhaler 3    nicotine (NICODERM CQ) 21 MG/24HR Place 1 patch onto the skin every 24 hours 30 patch 1    cetirizine (ZYRTEC) 10 MG tablet Take 1 tablet by mouth daily 90 tablet 3    omeprazole 20 MG EC tablet TAKE 1 TABLET BY MOUTH TWICE DAILY (STOP RANITIDINE) 60 tablet 3    MAPAP 500 MG tablet TAKE 1 TABLET BY MOUTH EVERY 6 HOURS AS NEEDED FOR PAIN 120 tablet 1    mometasone (NASONEX) 50 MCG/ACT nasal spray INHALE 2 SPRAYS BY NASAL ROUTE DAILY 17 g 5    ondansetron (ZOFRAN) 4 MG tablet Take 1 tablet by mouth every 6 hours as needed for Nausea or Vomiting 20 tablet 0    lidocaine (LMX) 4 % cream Apply 2-3 times a day as needed for pain 45 g 5    ranitidine (ZANTAC) 150 MG capsule TAKE 1 TABLET BY MOUTH 2 TIMES DAILY 60 capsule 3     No current facility-administered medications for this visit. Social History     Social History    Marital status:      Spouse name: N/A    Number of children: N/A    Years of education: N/A     Occupational History    Not on file.      Social History Main Topics    Smoking status: Current Every Day Smoker     Packs/day: 0.50     Years: 9.00     Types: Cigarettes    Smokeless tobacco: Never Used    Alcohol use 0.0 oz/week      Comment: patient is not nervous/anxious. Physical Exam    PHYSICAL EXAM:   VITALS:   Vitals:    03/14/18 0924   BP: 104/80   Resp: 20   Temp: 96.9 °F (36.1 °C)   SpO2: 98%     GENERAL:  Patient is a well-developed, well-nourished female  in no acute distress, alert and oriented x3, appropriate and pleasant conversation. HEAD: Normocephalic, atraumatic. EYES: Pupils equal, round and reactive to light and accommodation, extraocular   movements intact. ENT: Moist mucous membranes. No erythema is noted. NECK: Supple. No masses. No lymphadenopathy. CARDIOVASCULAR: Regular rate and rhythm. PULMONARY: Lungs are clear to auscultation bilaterally. ABDOMEN: Soft, nontender, nondistended. Positive bowel sounds. MUSCULOSKELETAL: Lower lumbar spine tenderness, patient is restless due to pain. Strength is normal, sensations normal.  NEUROLOGIC: Cranial nerves II through XII grossly intact. No focal deficits are noted. ASSESSMENT AND PLAN      1. Acute exacerbation of chronic low back pain  -Chronic low back pain due to work injury, will get the MRI results, started on Cymbalta and patches discussed with patient to call back if insurance will create any problems continue other medications. - DULoxetine (CYMBALTA) 30 MG extended release capsule; Take 1 capsule by mouth daily  Dispense: 30 capsule; Refill: 3  - diclofenac (FLECTOR) 1.3 % patch; Place 1 patch onto the skin 2 times daily  Dispense: 60 patch; Refill: 0    2. Lumbar disc herniation  -Started on Cymbalta and diclofenac patches  - DULoxetine (CYMBALTA) 30 MG extended release capsule; Take 1 capsule by mouth daily  Dispense: 30 capsule; Refill: 3  - diclofenac (FLECTOR) 1.3 % patch; Place 1 patch onto the skin 2 times daily  Dispense: 60 patch; Refill: 0    3. Lumbar radiculopathy  -We will get MRI results continue these medications until pain management will start epidural injections. - DULoxetine (CYMBALTA) 30 MG extended release capsule;  Take 1

## 2018-03-15 RX ORDER — NICOTINE POLACRILEX 4 MG/1
GUM, CHEWING ORAL
Qty: 60 TABLET | Refills: 0 | Status: SHIPPED | OUTPATIENT
Start: 2018-03-15 | End: 2018-04-11 | Stop reason: SDUPTHER

## 2018-03-16 ENCOUNTER — TELEPHONE (OUTPATIENT)
Dept: FAMILY MEDICINE CLINIC | Age: 28
End: 2018-03-16

## 2018-03-19 NOTE — TELEPHONE ENCOUNTER
Ins faxed and request for PA Denied. Dr. Saranya Whitney advised. PPW scanned and there is an option for an appeal if Dr. Saranya Whitney feels it is necessary.

## 2018-04-11 DIAGNOSIS — G89.29 ACUTE EXACERBATION OF CHRONIC LOW BACK PAIN: ICD-10-CM

## 2018-04-11 DIAGNOSIS — M54.50 ACUTE EXACERBATION OF CHRONIC LOW BACK PAIN: ICD-10-CM

## 2018-04-12 RX ORDER — LIDOCAINE 50 MG/G
1 PATCH TOPICAL DAILY
Qty: 30 PATCH | Refills: 3 | Status: SHIPPED | OUTPATIENT
Start: 2018-04-12 | End: 2019-01-18 | Stop reason: ALTCHOICE

## 2018-04-12 RX ORDER — NICOTINE POLACRILEX 4 MG/1
GUM, CHEWING ORAL
Qty: 60 TABLET | Refills: 3 | Status: SHIPPED | OUTPATIENT
Start: 2018-04-12 | End: 2018-10-31 | Stop reason: SDUPTHER

## 2018-04-20 ENCOUNTER — OFFICE VISIT (OUTPATIENT)
Dept: OBGYN CLINIC | Age: 28
End: 2018-04-20
Payer: MEDICARE

## 2018-04-20 VITALS
WEIGHT: 191 LBS | BODY MASS INDEX: 31.82 KG/M2 | RESPIRATION RATE: 18 BRPM | DIASTOLIC BLOOD PRESSURE: 80 MMHG | HEART RATE: 78 BPM | HEIGHT: 65 IN | SYSTOLIC BLOOD PRESSURE: 116 MMHG

## 2018-04-20 DIAGNOSIS — N63.13 BREAST LUMP ON RIGHT SIDE AT 7 O'CLOCK POSITION: Primary | ICD-10-CM

## 2018-04-20 PROCEDURE — 99213 OFFICE O/P EST LOW 20 MIN: CPT | Performed by: NURSE PRACTITIONER

## 2018-04-20 PROCEDURE — G8427 DOCREV CUR MEDS BY ELIG CLIN: HCPCS | Performed by: NURSE PRACTITIONER

## 2018-04-20 PROCEDURE — G8417 CALC BMI ABV UP PARAM F/U: HCPCS | Performed by: NURSE PRACTITIONER

## 2018-04-20 PROCEDURE — 4004F PT TOBACCO SCREEN RCVD TLK: CPT | Performed by: NURSE PRACTITIONER

## 2018-04-26 ENCOUNTER — HOSPITAL ENCOUNTER (OUTPATIENT)
Dept: WOMENS IMAGING | Age: 28
Discharge: HOME | End: 2018-04-28
Payer: MEDICARE

## 2018-04-26 ENCOUNTER — HOSPITAL ENCOUNTER (OUTPATIENT)
Dept: WOMENS IMAGING | Age: 28
End: 2018-04-26
Payer: MEDICARE

## 2018-04-26 DIAGNOSIS — N63.13 BREAST LUMP ON RIGHT SIDE AT 7 O'CLOCK POSITION: ICD-10-CM

## 2018-04-26 PROCEDURE — 76642 ULTRASOUND BREAST LIMITED: CPT

## 2018-06-05 DIAGNOSIS — J20.9 BRONCHITIS, ACUTE, WITH BRONCHOSPASM: ICD-10-CM

## 2018-06-05 DIAGNOSIS — R06.09 DOE (DYSPNEA ON EXERTION): Primary | ICD-10-CM

## 2018-06-05 RX ORDER — ALBUTEROL SULFATE 90 MCG
HFA AEROSOL WITH ADAPTER (GRAM) INHALATION
Qty: 1 INHALER | Refills: 0 | Status: SHIPPED | OUTPATIENT
Start: 2018-06-05 | End: 2021-05-19 | Stop reason: ALTCHOICE

## 2018-06-15 ENCOUNTER — TELEPHONE (OUTPATIENT)
Dept: FAMILY MEDICINE CLINIC | Age: 28
End: 2018-06-15

## 2018-06-15 DIAGNOSIS — R06.09 DOE (DYSPNEA ON EXERTION): Primary | ICD-10-CM

## 2018-10-30 ENCOUNTER — OFFICE VISIT (OUTPATIENT)
Dept: FAMILY MEDICINE CLINIC | Age: 28
End: 2018-10-30
Payer: MEDICARE

## 2018-10-30 VITALS
BODY MASS INDEX: 31.82 KG/M2 | HEIGHT: 65 IN | WEIGHT: 191 LBS | SYSTOLIC BLOOD PRESSURE: 132 MMHG | OXYGEN SATURATION: 98 % | DIASTOLIC BLOOD PRESSURE: 76 MMHG | HEART RATE: 94 BPM

## 2018-10-30 DIAGNOSIS — J30.2 SEASONAL ALLERGIC RHINITIS: ICD-10-CM

## 2018-10-30 DIAGNOSIS — M54.2 NECK PAIN ON RIGHT SIDE: ICD-10-CM

## 2018-10-30 DIAGNOSIS — J02.9 SORETHROAT: ICD-10-CM

## 2018-10-30 DIAGNOSIS — E66.9 OBESITY (BMI 30.0-34.9): ICD-10-CM

## 2018-10-30 DIAGNOSIS — G44.229 CHRONIC TENSION-TYPE HEADACHE, NOT INTRACTABLE: Primary | ICD-10-CM

## 2018-10-30 DIAGNOSIS — R73.9 HYPERGLYCEMIA: ICD-10-CM

## 2018-10-30 LAB
HBA1C MFR BLD: 5.4 %
S PYO AG THROAT QL: NORMAL

## 2018-10-30 PROCEDURE — 83036 HEMOGLOBIN GLYCOSYLATED A1C: CPT | Performed by: FAMILY MEDICINE

## 2018-10-30 PROCEDURE — 99214 OFFICE O/P EST MOD 30 MIN: CPT | Performed by: FAMILY MEDICINE

## 2018-10-30 PROCEDURE — 4004F PT TOBACCO SCREEN RCVD TLK: CPT | Performed by: FAMILY MEDICINE

## 2018-10-30 PROCEDURE — G8427 DOCREV CUR MEDS BY ELIG CLIN: HCPCS | Performed by: FAMILY MEDICINE

## 2018-10-30 PROCEDURE — G8484 FLU IMMUNIZE NO ADMIN: HCPCS | Performed by: FAMILY MEDICINE

## 2018-10-30 PROCEDURE — 87880 STREP A ASSAY W/OPTIC: CPT | Performed by: FAMILY MEDICINE

## 2018-10-30 PROCEDURE — G8417 CALC BMI ABV UP PARAM F/U: HCPCS | Performed by: FAMILY MEDICINE

## 2018-10-30 RX ORDER — LANOLIN ALCOHOL/MO/W.PET/CERES
400 CREAM (GRAM) TOPICAL EVERY EVENING
Qty: 30 TABLET | Refills: 3 | Status: SHIPPED | OUTPATIENT
Start: 2018-10-30 | End: 2019-02-19 | Stop reason: SDUPTHER

## 2018-10-30 RX ORDER — TOPIRAMATE 25 MG/1
25 TABLET ORAL NIGHTLY
Qty: 30 TABLET | Refills: 3 | Status: SHIPPED | OUTPATIENT
Start: 2018-10-30 | End: 2018-11-20 | Stop reason: SINTOL

## 2018-10-30 RX ORDER — BACLOFEN 10 MG/1
10 TABLET ORAL 3 TIMES DAILY PRN
Qty: 90 TABLET | Refills: 0 | Status: ON HOLD | OUTPATIENT
Start: 2018-10-30 | End: 2019-12-06 | Stop reason: ALTCHOICE

## 2018-10-30 RX ORDER — ONDANSETRON 4 MG/1
4 TABLET, FILM COATED ORAL EVERY 6 HOURS PRN
Qty: 24 TABLET | Refills: 0 | Status: SHIPPED | OUTPATIENT
Start: 2018-10-30 | End: 2018-11-05

## 2018-10-30 RX ORDER — BUTALBITAL, ACETAMINOPHEN AND CAFFEINE 50; 325; 40 MG/1; MG/1; MG/1
1 TABLET ORAL EVERY 6 HOURS PRN
Qty: 30 TABLET | Refills: 0 | Status: SHIPPED | OUTPATIENT
Start: 2018-10-30 | End: 2021-02-23 | Stop reason: SDUPTHER

## 2018-10-30 ASSESSMENT — ENCOUNTER SYMPTOMS
DIARRHEA: 0
NAUSEA: 0
CONSTIPATION: 0
ABDOMINAL DISTENTION: 0
WHEEZING: 0
ABDOMINAL PAIN: 0
CHEST TIGHTNESS: 0
VOMITING: 0
SORE THROAT: 1
SHORTNESS OF BREATH: 0

## 2018-10-30 ASSESSMENT — PATIENT HEALTH QUESTIONNAIRE - PHQ9
2. FEELING DOWN, DEPRESSED OR HOPELESS: 0
SUM OF ALL RESPONSES TO PHQ QUESTIONS 1-9: 0
SUM OF ALL RESPONSES TO PHQ9 QUESTIONS 1 & 2: 0
SUM OF ALL RESPONSES TO PHQ QUESTIONS 1-9: 0
1. LITTLE INTEREST OR PLEASURE IN DOING THINGS: 0

## 2018-10-30 NOTE — PROGRESS NOTES
Mychart comment sent to patient. Negative strep. A1c normal at 5.4.   Previously prediabetes 4 years ago with A1c  6   This was discussed at the appointment today with pain
Partners: Male     Birth control/ protection: Surgical      Comment: hyst     Other Topics Concern    None     Social History Narrative    She has 3 children: 1 has short stature and 1 has Down syndrome         Ready to quit: No  Counseling given: Yes                    The patient's past medical, surgical, social, and family history as well as her current medications and allergies werereviewed as documented in today's encounter. Rest of complaints with corresponding details per ROS. Review of Systems   Constitutional: Positive for fatigue and unexpected weight change. Negative for activity change, appetite change, chills, diaphoresis and fever. HENT: Positive for congestion and sore throat. Negative for ear pain, postnasal drip, rhinorrhea, sinus pain and sinus pressure. Respiratory: Positive for cough. Negative for chest tightness, shortness of breath and wheezing. Cardiovascular: Negative for chest pain, palpitations and leg swelling. Gastrointestinal: Negative for abdominal distention, abdominal pain, constipation, diarrhea, nausea and vomiting. Endocrine: Negative for cold intolerance, heat intolerance, polydipsia, polyphagia and polyuria. Musculoskeletal: Positive for myalgias and neck pain. Allergic/Immunologic: Positive for environmental allergies. Neurological: Positive for headaches. Psychiatric/Behavioral: Negative for dysphoric mood and sleep disturbance. The patient is nervous/anxious.          -vital signs stable and within normal limits except Obesity per BMI. /76   Pulse 94   Ht 5' 5\" (1.651 m)   Wt 191 lb (86.6 kg)   LMP 07/04/2012 (Approximate)   SpO2 98%   BMI 31.78 kg/m²        Physical Exam   Constitutional: She is oriented to person, place, and time. She appears well-developed and well-nourished. No distress. HENT:   Head: Normocephalic and atraumatic. Right Ear: External ear normal.   Left Ear: External ear normal.   Nose: Mucosal edema present.

## 2018-10-31 DIAGNOSIS — J30.2 SEASONAL ALLERGIC RHINITIS: ICD-10-CM

## 2018-10-31 DIAGNOSIS — K21.9 GASTROESOPHAGEAL REFLUX DISEASE WITHOUT ESOPHAGITIS: Primary | ICD-10-CM

## 2018-10-31 RX ORDER — NICOTINE POLACRILEX 4 MG/1
GUM, CHEWING ORAL
Qty: 60 TABLET | Refills: 3 | Status: SHIPPED | OUTPATIENT
Start: 2018-10-31 | End: 2019-05-24 | Stop reason: HOSPADM

## 2018-10-31 RX ORDER — CETIRIZINE HYDROCHLORIDE 10 MG/1
10 TABLET ORAL DAILY
Qty: 90 TABLET | Refills: 3 | Status: SHIPPED | OUTPATIENT
Start: 2018-10-31 | End: 2019-05-24 | Stop reason: SDUPTHER

## 2018-10-31 RX ORDER — MOMETASONE FUROATE 50 UG/1
SPRAY, METERED NASAL
Qty: 17 G | Refills: 3 | Status: SHIPPED | OUTPATIENT
Start: 2018-10-31 | End: 2021-05-19 | Stop reason: ALTCHOICE

## 2018-10-31 NOTE — TELEPHONE ENCOUNTER
Please Approve or Refuse.   Send to Pharmacy per Pt's Request:      Next Visit Date: 11/20/2018  Last Visit Date: Visit date not found    Hemoglobin A1C (%)   Date Value   10/30/2018 5.4   04/09/2015 5.4   10/14/2014 6.0             ( goal A1C is < 7)   BP Readings from Last 3 Encounters:   10/30/18 132/76   04/20/18 116/80   03/14/18 104/80          (goal 120/80)  BUN   Date Value Ref Range Status   05/02/2017 11 6 - 20 mg/dL Final     CREATININE   Date Value Ref Range Status   05/02/2017 0.53 0.50 - 0.90 mg/dL Final     Potassium   Date Value Ref Range Status   05/02/2017 4.0 3.7 - 5.3 mmol/L Final

## 2018-11-05 PROBLEM — G44.229 CHRONIC TENSION-TYPE HEADACHE, NOT INTRACTABLE: Status: ACTIVE | Noted: 2017-02-28

## 2018-11-05 PROBLEM — R73.9 HYPERGLYCEMIA: Status: ACTIVE | Noted: 2018-11-05

## 2018-11-05 ASSESSMENT — ENCOUNTER SYMPTOMS
SINUS PRESSURE: 0
RHINORRHEA: 0
COUGH: 1
SINUS PAIN: 0

## 2018-11-20 ENCOUNTER — OFFICE VISIT (OUTPATIENT)
Dept: FAMILY MEDICINE CLINIC | Age: 28
End: 2018-11-20
Payer: MEDICARE

## 2018-11-20 VITALS
TEMPERATURE: 97.4 F | WEIGHT: 188.4 LBS | BODY MASS INDEX: 31.39 KG/M2 | HEART RATE: 68 BPM | DIASTOLIC BLOOD PRESSURE: 86 MMHG | OXYGEN SATURATION: 98 % | SYSTOLIC BLOOD PRESSURE: 130 MMHG | HEIGHT: 65 IN

## 2018-11-20 DIAGNOSIS — R63.8 UNABLE TO LOSE WEIGHT: ICD-10-CM

## 2018-11-20 DIAGNOSIS — F31.81 BIPOLAR II DISORDER (HCC): ICD-10-CM

## 2018-11-20 DIAGNOSIS — E66.9 OBESITY (BMI 30.0-34.9): Primary | ICD-10-CM

## 2018-11-20 PROBLEM — M54.50 ACUTE EXACERBATION OF CHRONIC LOW BACK PAIN: Status: RESOLVED | Noted: 2017-05-06 | Resolved: 2018-11-20

## 2018-11-20 PROBLEM — G89.29 ACUTE EXACERBATION OF CHRONIC LOW BACK PAIN: Status: RESOLVED | Noted: 2017-05-06 | Resolved: 2018-11-20

## 2018-11-20 PROCEDURE — 99213 OFFICE O/P EST LOW 20 MIN: CPT | Performed by: FAMILY MEDICINE

## 2018-11-20 PROCEDURE — G8417 CALC BMI ABV UP PARAM F/U: HCPCS | Performed by: FAMILY MEDICINE

## 2018-11-20 PROCEDURE — G8484 FLU IMMUNIZE NO ADMIN: HCPCS | Performed by: FAMILY MEDICINE

## 2018-11-20 PROCEDURE — G8427 DOCREV CUR MEDS BY ELIG CLIN: HCPCS | Performed by: FAMILY MEDICINE

## 2018-11-20 PROCEDURE — 4004F PT TOBACCO SCREEN RCVD TLK: CPT | Performed by: FAMILY MEDICINE

## 2018-11-20 RX ORDER — PHENTERMINE HYDROCHLORIDE 37.5 MG/1
37.5 TABLET ORAL
Qty: 30 TABLET | Refills: 0 | Status: SHIPPED | OUTPATIENT
Start: 2018-11-20 | End: 2018-12-18 | Stop reason: SDUPTHER

## 2018-11-20 ASSESSMENT — PATIENT HEALTH QUESTIONNAIRE - PHQ9
1. LITTLE INTEREST OR PLEASURE IN DOING THINGS: 0
SUM OF ALL RESPONSES TO PHQ9 QUESTIONS 1 & 2: 0
SUM OF ALL RESPONSES TO PHQ QUESTIONS 1-9: 0
2. FEELING DOWN, DEPRESSED OR HOPELESS: 0
SUM OF ALL RESPONSES TO PHQ QUESTIONS 1-9: 0

## 2018-11-20 ASSESSMENT — ENCOUNTER SYMPTOMS
WHEEZING: 0
SHORTNESS OF BREATH: 0
COUGH: 0
CHEST TIGHTNESS: 0

## 2018-11-20 NOTE — PROGRESS NOTES
Chief Complaint   Patient presents with    Weight Management     adipex #1         Natasha Haywood  here today for follow up on  Weight Management (adipex #1)      HPI    Wants to lose weight. Natasha Haywood has made a substantial good daniel effort to lose weight in a regimen for weight reduction based on caloric restriction, nutritional counseling, intensive behavioral therapy, and exercise    Natasha Haywood reports he did:stopped drinking pop , has been eating healthier, her  has diabetes and they had been eating much healthier for a long time, for almost 1 yr, but she still unable to lose weight, while her  already lost a lot of weight. Patient also is very active, with her young children, and has an active job as well. In the past, she has tried Metformin,Wellbutrin, but didn't lose any weight. At last appointment she was started on Topamax didn't help much and made her groggy in the morning when she wakes up. She stopped it as she couldn't take it. Contraindications to controlled substance anorexiant: NONE    Natasha CARRASQUILLO Shaquillesamuel reports no  use of illegal drug substances  Natasha Haywood reports alcohol use of :none    OARRS done today-OK  Plan: diet and exercise, start Adipex    Patient informed that when prescribed a controlled substance for weight loss, the provider is required by law to see the patient for an appointment every thirty days. This is neccessary to record the weight and blood pressure and to assess patient's efforts to lose weight, and to ensure there are no contraindications or adverse effects. BP controlled. BP Readings from Last 3 Encounters:   11/20/18 130/86   10/30/18 132/76   04/20/18 116/80        Pulse controlled. Pulse Readings from Last 3 Encounters:   11/20/18 68   10/30/18 94   04/20/18 78       Lost 3 lbs Intentionally in 2 weeks, she thinks she has different clothes.   Obesity per BMI    Wt Readings from Last 3 Encounters:   11/20/18 188 lb 6.4 oz Patient will follow-up in 1 month(s) with PCP. Provider spent 10 minutes counseling patient. 2. Bipolar II disorder ([address]  Stable  Has been off psych meds for years  Has good family support   Weight loss will improve her self-esteem  PHQ Scores 11/20/2018 10/30/2018 11/16/2017 10/3/2017 2/28/2017 12/28/2016   PHQ2 Score 0 0 0 0 0 1   PHQ9 Score 0 0 0 0 0 1     Interpretation of Total Score Depression Severity: 1-4 = Minimal depression, 5-9 = Mild depression, 10-14 = Moderate depression, 15-19 = Moderately severe depression, 20-27 = Severe depression       3. Unable to lose weight  -start phentermine (ADIPEX-P) 37.5 MG tablet; Take 1 tablet by mouth every morning (before breakfast) for 30 days. .  Dispense: 30 tablet; Refill: 0    Has orders for blood work to do    Controlled Substances Monitoring:     RX Monitoring 11/20/2018   Attestation The Prescription Monitoring Report for this patient was reviewed today. Documentation No signs of potential drug abuse or diversion identified. ;Possible medication side effects, risk of tolerance/dependence & alternative treatments discussed. Medication Contracts Medication contract signed today. No orders of the defined types were placed in this encounter. Medications Discontinued During This Encounter   Medication Reason    topiramate (TOPAMAX) 25 MG tablet Side effects       Natasha received counseling on the following healthy behaviors: nutrition, exercise, medication adherence, tobacco cessation and weight loss  Reviewed prior labs and health maintenance  Continue current medications, diet and exercise. Discussed use, benefit, and side effects of prescribed medications. Barriers to medication compliance addressed. Patient given educational materials - see patient instructions  Was a self-tracking handout given in paper form or via [employer]?  No    Requested Prescriptions     Signed Prescriptions Disp Refills    phentermine (ADIPEX-P) 37.5 MG tablet 30

## 2018-11-27 ENCOUNTER — PATIENT MESSAGE (OUTPATIENT)
Dept: FAMILY MEDICINE CLINIC | Age: 28
End: 2018-11-27

## 2018-11-27 DIAGNOSIS — K59.01 SLOW TRANSIT CONSTIPATION: Primary | ICD-10-CM

## 2018-11-27 RX ORDER — POLYETHYLENE GLYCOL 3350 17 G/17G
17 POWDER, FOR SOLUTION ORAL DAILY PRN
Qty: 1 BOTTLE | Refills: 3 | Status: SHIPPED | OUTPATIENT
Start: 2018-11-27 | End: 2019-03-20 | Stop reason: SDUPTHER

## 2018-12-18 ENCOUNTER — OFFICE VISIT (OUTPATIENT)
Dept: FAMILY MEDICINE CLINIC | Age: 28
End: 2018-12-18
Payer: MEDICARE

## 2018-12-18 VITALS
DIASTOLIC BLOOD PRESSURE: 88 MMHG | WEIGHT: 179.6 LBS | TEMPERATURE: 97 F | OXYGEN SATURATION: 98 % | BODY MASS INDEX: 29.92 KG/M2 | SYSTOLIC BLOOD PRESSURE: 121 MMHG | HEART RATE: 97 BPM | HEIGHT: 65 IN

## 2018-12-18 DIAGNOSIS — F41.9 ANXIETY: ICD-10-CM

## 2018-12-18 DIAGNOSIS — F31.81 BIPOLAR II DISORDER (HCC): ICD-10-CM

## 2018-12-18 DIAGNOSIS — E66.9 OBESITY (BMI 30.0-34.9): Primary | ICD-10-CM

## 2018-12-18 PROCEDURE — G8484 FLU IMMUNIZE NO ADMIN: HCPCS | Performed by: FAMILY MEDICINE

## 2018-12-18 PROCEDURE — G8427 DOCREV CUR MEDS BY ELIG CLIN: HCPCS | Performed by: FAMILY MEDICINE

## 2018-12-18 PROCEDURE — 4004F PT TOBACCO SCREEN RCVD TLK: CPT | Performed by: FAMILY MEDICINE

## 2018-12-18 PROCEDURE — G8417 CALC BMI ABV UP PARAM F/U: HCPCS | Performed by: FAMILY MEDICINE

## 2018-12-18 PROCEDURE — 99213 OFFICE O/P EST LOW 20 MIN: CPT | Performed by: FAMILY MEDICINE

## 2018-12-18 RX ORDER — PHENTERMINE HYDROCHLORIDE 37.5 MG/1
37.5 TABLET ORAL
Qty: 30 TABLET | Refills: 0 | Status: SHIPPED | OUTPATIENT
Start: 2018-12-18 | End: 2019-01-18 | Stop reason: SDUPTHER

## 2018-12-18 ASSESSMENT — ENCOUNTER SYMPTOMS
CHEST TIGHTNESS: 0
SHORTNESS OF BREATH: 0

## 2018-12-18 ASSESSMENT — PATIENT HEALTH QUESTIONNAIRE - PHQ9
SUM OF ALL RESPONSES TO PHQ QUESTIONS 1-9: 0
SUM OF ALL RESPONSES TO PHQ QUESTIONS 1-9: 0
2. FEELING DOWN, DEPRESSED OR HOPELESS: 0
1. LITTLE INTEREST OR PLEASURE IN DOING THINGS: 0
SUM OF ALL RESPONSES TO PHQ9 QUESTIONS 1 & 2: 0

## 2018-12-18 NOTE — PROGRESS NOTES
Visit Information    Have you changed or started any medications since your last visit including any over-the-counter medicines, vitamins, or herbal medicines? no   Are you having any side effects from any of your medications? -  no  Have you stopped taking any of your medications? Is so, why? -  no    Have you seen any other physician or provider since your last visit? No  Have you had any other diagnostic tests since your last visit? No  Have you been seen in the emergency room and/or had an admission to a hospital since we last saw you? No  Have you had your routine dental cleaning in the past 6 months? yes -     Have you activated your [employer] account? If not, what are your barriers?  Yes     Patient Care Team:  Val Quiroga MD as PCP - [address] [place] [address] as Consulting Physician (Obstetrics & Gynecology)  Alejandro Carrillo MD as Consulting Physician (Urology)    Medical History Review  Past Medical, Family, and Social History reviewed and does contribute to the patient presenting condition    Health Maintenance   Topic Date Due    DTaP/Tdap/Td vaccine (1 - Tdap) 04/30/2019 (Originally 10/19/2008)    A1C test (Diabetic or Prediabetic)  10/30/2019    Flu vaccine  Completed    Pneumococcal med risk  Completed    HIV screen  Completed

## 2018-12-18 NOTE — PROGRESS NOTES
Chief Complaint   Patient presents with    Weight Management     adipex #2         Livier Haywood   here today for follow up on chronic medical problems, go over labs and/or diagnostic studies, and medication refills. Weight Management (adipex #2)      HPI    Wants to lose weight. Natasha Haywood was started on Adipex 1 month ago. In addition to the medication, patient also using diet and exercise as follows:  -diet:low carb diet  -exercise:has been exercising 6 days per week    Medication side effects: None. Patient denies anorexia, nausea, vomiting, abdominal pain, involuntary weight loss, palpitations, insomnia, irritability, anxiety, headache and tremor. Patient informed that when prescribed a controlled substance for weight loss, the provider is required by law to see the patient for an appointment every thirty days. This is neccessary to record the weight and blood pressure and to assess patient's efforts to lose weight, and to ensure there are no contraindications or adverse effects. BP controlled   BP Readings from Last 3 Encounters:   12/18/18 121/88   11/20/18 130/86   10/30/18 132/76      Pulse controlled   Pulse Readings from Last 3 Encounters:   12/18/18 97   11/20/18 68   10/30/18 94       Patient intentionally lost 9 pounds in 1 month    Wt Readings from Last 3 Encounters:   12/18/18 179 lb 9.6 oz (81.5 kg)   11/20/18 188 lb 6.4 oz (85.5 kg)   10/30/18 191 lb (86.6 kg)           Exercise Tracking - Detailed 12/18/2018   Cardiovascular Equipment Duration 30   Cardiovascular Equipment Intensity Moderate   Cardiovascular Equipment Times/Week 6   Exercise Classes / Videos Duration 30   Exercise Classes / Videos Intensity Moderate   Exercise Classes / Videos Times/Week 3   Total Exercise Minutes/Week 270         smoking 5 cigarettes/day , down from 1 PPD    Nicotine dependence. Smoker, counseling given to quit smoking. Ready to quit: Yes  Counseling given:  Yes                    Patient has Neurological: Negative for tremors, weakness, light-headedness and headaches. Psychiatric/Behavioral: Negative for dysphoric mood and sleep disturbance. The patient is nervous/anxious.            -vital signs stable and within normal limits except Obesity per BMI. /88   Pulse 97   Temp 97 °F (36.1 °C) (Tympanic)   Ht 5' 5\" (1.651 m)   Wt 179 lb 9.6 oz (81.5 kg)   LMP 07/04/2012 (Approximate)   SpO2 98%   BMI 29.89 kg/m²      Physical Exam   Constitutional: She is oriented to person, place, and time. She appears well-developed and well-nourished. No distress. HENT:   Head: Normocephalic and atraumatic. Eyes: Conjunctivae and EOM are normal. Right eye exhibits no discharge. Left eye exhibits no discharge. Neck: Normal range of motion. Neck supple. Cardiovascular: Normal rate, regular rhythm, normal heart sounds and intact distal pulses. Pulmonary/Chest: Effort normal and breath sounds normal. No respiratory distress. She has no wheezes. Neurological: She is alert and oriented to person, place, and time. Skin: Skin is warm and dry. Capillary refill takes less than 2 seconds. She is not diaphoretic. Psychiatric: She has a normal mood and affect. Her behavior is normal. Judgment and thought content normal.   Nursing note and vitals reviewed. Discussed testing with the patient and all questions fully answered.   Labs within normal limits   Office Visit on 10/30/2018   Component Date Value Ref Range Status    Hemoglobin A1C 10/30/2018 5.4  % Final    Strep A Ag 10/30/2018 None Detected  None Detected Final         Lab Results   Component Value Date    WBC 10.9 05/02/2017    HGB 14.3 05/02/2017    HCT 40.6 05/02/2017    MCV 90.1 05/02/2017     05/02/2017       Lab Results   Component Value Date     05/02/2017    K 4.0 05/02/2017     05/02/2017    CO2 27 05/02/2017    BUN 11 05/02/2017    CREATININE 0.53 05/02/2017    GLUCOSE 91 05/02/2017    CALCIUM 9.7

## 2019-01-18 ENCOUNTER — OFFICE VISIT (OUTPATIENT)
Dept: FAMILY MEDICINE CLINIC | Age: 29
End: 2019-01-18
Payer: MEDICARE

## 2019-01-18 ENCOUNTER — HOSPITAL ENCOUNTER (OUTPATIENT)
Age: 29
Discharge: HOME | End: 2019-01-18
Payer: MEDICARE

## 2019-01-18 VITALS
OXYGEN SATURATION: 99 % | DIASTOLIC BLOOD PRESSURE: 82 MMHG | HEIGHT: 65 IN | SYSTOLIC BLOOD PRESSURE: 126 MMHG | WEIGHT: 174.8 LBS | BODY MASS INDEX: 29.12 KG/M2 | HEART RATE: 95 BPM

## 2019-01-18 DIAGNOSIS — R94.31 ABNORMAL ECG: ICD-10-CM

## 2019-01-18 DIAGNOSIS — E66.9 OBESITY (BMI 30.0-34.9): Primary | ICD-10-CM

## 2019-01-18 DIAGNOSIS — E66.9 OBESITY (BMI 30.0-34.9): ICD-10-CM

## 2019-01-18 DIAGNOSIS — R07.89 MUSCULOSKELETAL CHEST PAIN: ICD-10-CM

## 2019-01-18 LAB
ALBUMIN SERPL-MCNC: 4.7 G/DL (ref 3.5–5.2)
ALBUMIN/GLOBULIN RATIO: NORMAL (ref 1–2.5)
ALP BLD-CCNC: 75 U/L (ref 35–104)
ALT SERPL-CCNC: 21 U/L (ref 5–33)
ANION GAP SERPL CALCULATED.3IONS-SCNC: 14 MMOL/L (ref 9–17)
AST SERPL-CCNC: 20 U/L
BILIRUB SERPL-MCNC: 0.37 MG/DL (ref 0.3–1.2)
BUN BLDV-MCNC: 8 MG/DL (ref 6–20)
BUN/CREAT BLD: NORMAL (ref 9–20)
CALCIUM SERPL-MCNC: 10.2 MG/DL (ref 8.6–10.4)
CHLORIDE BLD-SCNC: 101 MMOL/L (ref 98–107)
CO2: 26 MMOL/L (ref 20–31)
CREAT SERPL-MCNC: 0.65 MG/DL (ref 0.5–0.9)
EKG ATRIAL RATE: 89 BPM
EKG P AXIS: 52 DEGREES
EKG P-R INTERVAL: 148 MS
EKG Q-T INTERVAL: 354 MS
EKG QRS DURATION: 102 MS
EKG QTC CALCULATION (BAZETT): 430 MS
EKG R AXIS: 46 DEGREES
EKG T AXIS: 53 DEGREES
EKG VENTRICULAR RATE: 89 BPM
GFR AFRICAN AMERICAN: >60 ML/MIN
GFR NON-AFRICAN AMERICAN: >60 ML/MIN
GFR SERPL CREATININE-BSD FRML MDRD: NORMAL ML/MIN/{1.73_M2}
GFR SERPL CREATININE-BSD FRML MDRD: NORMAL ML/MIN/{1.73_M2}
GLUCOSE BLD-MCNC: 96 MG/DL (ref 70–99)
POTASSIUM SERPL-SCNC: 4.1 MMOL/L (ref 3.7–5.3)
SODIUM BLD-SCNC: 141 MMOL/L (ref 135–144)
TOTAL PROTEIN: 8.1 G/DL (ref 6.4–8.3)
TSH SERPL DL<=0.05 MIU/L-ACNC: 1.3 MIU/L (ref 0.3–5)

## 2019-01-18 PROCEDURE — G8417 CALC BMI ABV UP PARAM F/U: HCPCS | Performed by: FAMILY MEDICINE

## 2019-01-18 PROCEDURE — 80053 COMPREHEN METABOLIC PANEL: CPT

## 2019-01-18 PROCEDURE — G8427 DOCREV CUR MEDS BY ELIG CLIN: HCPCS | Performed by: FAMILY MEDICINE

## 2019-01-18 PROCEDURE — 4004F PT TOBACCO SCREEN RCVD TLK: CPT | Performed by: FAMILY MEDICINE

## 2019-01-18 PROCEDURE — 93005 ELECTROCARDIOGRAM TRACING: CPT

## 2019-01-18 PROCEDURE — G8484 FLU IMMUNIZE NO ADMIN: HCPCS | Performed by: FAMILY MEDICINE

## 2019-01-18 PROCEDURE — 99213 OFFICE O/P EST LOW 20 MIN: CPT | Performed by: FAMILY MEDICINE

## 2019-01-18 PROCEDURE — 84443 ASSAY THYROID STIM HORMONE: CPT

## 2019-01-18 RX ORDER — PHENTERMINE HYDROCHLORIDE 37.5 MG/1
37.5 TABLET ORAL
Qty: 30 TABLET | Refills: 0 | Status: SHIPPED | OUTPATIENT
Start: 2019-01-18 | End: 2019-02-17

## 2019-01-18 RX ORDER — CHOLECALCIFEROL (VITAMIN D3) 50 MCG
2000 TABLET ORAL DAILY
Qty: 30 TABLET | Refills: 3 | Status: SHIPPED | OUTPATIENT
Start: 2019-01-18 | End: 2019-05-10 | Stop reason: SDUPTHER

## 2019-01-18 ASSESSMENT — ENCOUNTER SYMPTOMS
ABDOMINAL PAIN: 0
COUGH: 0
NAUSEA: 0
WHEEZING: 0
SHORTNESS OF BREATH: 0
CHEST TIGHTNESS: 0

## 2019-01-19 PROBLEM — R94.31 ABNORMAL ECG: Status: ACTIVE | Noted: 2019-01-19

## 2019-01-22 ENCOUNTER — TELEPHONE (OUTPATIENT)
Dept: FAMILY MEDICINE CLINIC | Age: 29
End: 2019-01-22

## 2019-01-22 DIAGNOSIS — J01.81 OTHER ACUTE RECURRENT SINUSITIS: Primary | ICD-10-CM

## 2019-01-22 RX ORDER — CEFUROXIME AXETIL 500 MG/1
500 TABLET ORAL EVERY 12 HOURS
Qty: 20 TABLET | Refills: 0 | Status: SHIPPED | OUTPATIENT
Start: 2019-01-22 | End: 2019-02-01

## 2019-01-23 DIAGNOSIS — I45.10 INCOMPLETE RBBB: Primary | ICD-10-CM

## 2019-01-23 DIAGNOSIS — R07.89 ATYPICAL CHEST PAIN: ICD-10-CM

## 2019-01-23 DIAGNOSIS — R07.89 OTHER CHEST PAIN: ICD-10-CM

## 2019-01-23 DIAGNOSIS — R94.31 ABNORMAL ELECTROCARDIOGRAM: Primary | ICD-10-CM

## 2019-02-19 DIAGNOSIS — G44.229 CHRONIC TENSION-TYPE HEADACHE, NOT INTRACTABLE: ICD-10-CM

## 2019-02-19 DIAGNOSIS — E66.9 OBESITY (BMI 30.0-34.9): ICD-10-CM

## 2019-02-19 RX ORDER — LANOLIN ALCOHOL/MO/W.PET/CERES
400 CREAM (GRAM) TOPICAL EVERY EVENING
Qty: 90 TABLET | Refills: 2 | Status: SHIPPED | OUTPATIENT
Start: 2019-02-19 | End: 2019-05-24 | Stop reason: HOSPADM

## 2019-02-19 RX ORDER — TOPIRAMATE 25 MG/1
25 TABLET ORAL NIGHTLY
Qty: 90 TABLET | Refills: 2 | Status: SHIPPED | OUTPATIENT
Start: 2019-02-19 | End: 2019-05-24 | Stop reason: ALTCHOICE

## 2019-03-05 ENCOUNTER — OFFICE VISIT (OUTPATIENT)
Dept: FAMILY MEDICINE CLINIC | Age: 29
End: 2019-03-05
Payer: MEDICARE

## 2019-03-05 ENCOUNTER — TELEPHONE (OUTPATIENT)
Dept: FAMILY MEDICINE CLINIC | Age: 29
End: 2019-03-05

## 2019-03-05 VITALS
BODY MASS INDEX: 29.46 KG/M2 | DIASTOLIC BLOOD PRESSURE: 83 MMHG | HEIGHT: 65 IN | HEART RATE: 81 BPM | SYSTOLIC BLOOD PRESSURE: 127 MMHG | OXYGEN SATURATION: 99 % | WEIGHT: 176.8 LBS

## 2019-03-05 DIAGNOSIS — E66.3 OVERWEIGHT (BMI 25.0-29.9): ICD-10-CM

## 2019-03-05 DIAGNOSIS — Z01.84 IMMUNITY STATUS TESTING: ICD-10-CM

## 2019-03-05 DIAGNOSIS — F31.81 BIPOLAR II DISORDER (HCC): ICD-10-CM

## 2019-03-05 DIAGNOSIS — F41.1 GAD (GENERALIZED ANXIETY DISORDER): ICD-10-CM

## 2019-03-05 DIAGNOSIS — I45.10 INCOMPLETE RBBB: ICD-10-CM

## 2019-03-05 DIAGNOSIS — M25.512 ACUTE PAIN OF LEFT SHOULDER: Primary | ICD-10-CM

## 2019-03-05 PROBLEM — F17.210 CIGARETTE NICOTINE DEPENDENCE WITHOUT COMPLICATION: Status: ACTIVE | Noted: 2019-02-28

## 2019-03-05 PROBLEM — E66.9 OBESITY: Status: ACTIVE | Noted: 2019-03-05

## 2019-03-05 PROBLEM — R07.9 CHEST PAIN: Status: ACTIVE | Noted: 2019-03-05

## 2019-03-05 PROCEDURE — 96160 PT-FOCUSED HLTH RISK ASSMT: CPT | Performed by: FAMILY MEDICINE

## 2019-03-05 PROCEDURE — 99214 OFFICE O/P EST MOD 30 MIN: CPT | Performed by: FAMILY MEDICINE

## 2019-03-05 PROCEDURE — G8482 FLU IMMUNIZE ORDER/ADMIN: HCPCS | Performed by: FAMILY MEDICINE

## 2019-03-05 PROCEDURE — G8417 CALC BMI ABV UP PARAM F/U: HCPCS | Performed by: FAMILY MEDICINE

## 2019-03-05 PROCEDURE — 4004F PT TOBACCO SCREEN RCVD TLK: CPT | Performed by: FAMILY MEDICINE

## 2019-03-05 PROCEDURE — G8427 DOCREV CUR MEDS BY ELIG CLIN: HCPCS | Performed by: FAMILY MEDICINE

## 2019-03-05 RX ORDER — ESCITALOPRAM OXALATE 5 MG/1
5 TABLET ORAL DAILY
Qty: 90 TABLET | Refills: 1 | Status: SHIPPED | OUTPATIENT
Start: 2019-03-05 | End: 2019-08-15 | Stop reason: SDUPTHER

## 2019-03-05 ASSESSMENT — ANXIETY QUESTIONNAIRES
6. BECOMING EASILY ANNOYED OR IRRITABLE: 2-OVER HALF THE DAYS
3. WORRYING TOO MUCH ABOUT DIFFERENT THINGS: 2-OVER HALF THE DAYS
GAD7 TOTAL SCORE: 12
1. FEELING NERVOUS, ANXIOUS, OR ON EDGE: 2-OVER HALF THE DAYS
5. BEING SO RESTLESS THAT IT IS HARD TO SIT STILL: 2-OVER HALF THE DAYS
7. FEELING AFRAID AS IF SOMETHING AWFUL MIGHT HAPPEN: 0-NOT AT ALL SURE
4. TROUBLE RELAXING: 2-OVER HALF THE DAYS
2. NOT BEING ABLE TO STOP OR CONTROL WORRYING: 2-OVER HALF THE DAYS

## 2019-03-05 ASSESSMENT — PATIENT HEALTH QUESTIONNAIRE - PHQ9
1. LITTLE INTEREST OR PLEASURE IN DOING THINGS: 0
SUM OF ALL RESPONSES TO PHQ QUESTIONS 1-9: 6
3. TROUBLE FALLING OR STAYING ASLEEP: 1
2. FEELING DOWN, DEPRESSED OR HOPELESS: 1
7. TROUBLE CONCENTRATING ON THINGS, SUCH AS READING THE NEWSPAPER OR WATCHING TELEVISION: 1
8. MOVING OR SPEAKING SO SLOWLY THAT OTHER PEOPLE COULD HAVE NOTICED. OR THE OPPOSITE, BEING SO FIGETY OR RESTLESS THAT YOU HAVE BEEN MOVING AROUND A LOT MORE THAN USUAL: 0
5. POOR APPETITE OR OVEREATING: 1
6. FEELING BAD ABOUT YOURSELF - OR THAT YOU ARE A FAILURE OR HAVE LET YOURSELF OR YOUR FAMILY DOWN: 0
9. THOUGHTS THAT YOU WOULD BE BETTER OFF DEAD, OR OF HURTING YOURSELF: 1
10. IF YOU CHECKED OFF ANY PROBLEMS, HOW DIFFICULT HAVE THESE PROBLEMS MADE IT FOR YOU TO DO YOUR WORK, TAKE CARE OF THINGS AT HOME, OR GET ALONG WITH OTHER PEOPLE: 0
SUM OF ALL RESPONSES TO PHQ9 QUESTIONS 1 & 2: 1
SUM OF ALL RESPONSES TO PHQ QUESTIONS 1-9: 6
4. FEELING TIRED OR HAVING LITTLE ENERGY: 1

## 2019-03-05 ASSESSMENT — ENCOUNTER SYMPTOMS
ABDOMINAL PAIN: 0
ABDOMINAL DISTENTION: 0
NAUSEA: 0
COUGH: 0
SHORTNESS OF BREATH: 0
DIARRHEA: 0
CHEST TIGHTNESS: 0
VOMITING: 0
WHEEZING: 0
CONSTIPATION: 0

## 2019-03-08 ENCOUNTER — TELEPHONE (OUTPATIENT)
Dept: FAMILY MEDICINE CLINIC | Age: 29
End: 2019-03-08

## 2019-03-08 DIAGNOSIS — J11.1 INFLUENZA-LIKE ILLNESS: Primary | ICD-10-CM

## 2019-03-08 RX ORDER — OSELTAMIVIR PHOSPHATE 75 MG/1
75 CAPSULE ORAL 2 TIMES DAILY
Qty: 10 CAPSULE | Refills: 0 | Status: SHIPPED | OUTPATIENT
Start: 2019-03-08 | End: 2019-03-13

## 2019-03-11 ENCOUNTER — HOSPITAL ENCOUNTER (OUTPATIENT)
Age: 29
Discharge: HOME | End: 2019-03-11
Payer: MEDICARE

## 2019-03-11 DIAGNOSIS — Z01.84 IMMUNITY STATUS TESTING: ICD-10-CM

## 2019-03-11 PROBLEM — F41.1 GAD (GENERALIZED ANXIETY DISORDER): Status: ACTIVE | Noted: 2019-03-11

## 2019-03-11 PROBLEM — E66.9 OBESITY: Status: RESOLVED | Noted: 2019-03-05 | Resolved: 2019-03-11

## 2019-03-11 PROBLEM — R94.31 ABNORMAL ECG: Status: RESOLVED | Noted: 2019-01-19 | Resolved: 2019-03-11

## 2019-03-11 PROBLEM — F31.81 BIPOLAR II DISORDER (HCC): Status: RESOLVED | Noted: 2019-03-05 | Resolved: 2019-03-11

## 2019-03-11 PROBLEM — R07.9 CHEST PAIN: Status: RESOLVED | Noted: 2019-03-05 | Resolved: 2019-03-11

## 2019-03-11 LAB — RUBV IGG SER QL: 52.7 IU/ML

## 2019-03-11 PROCEDURE — 86735 MUMPS ANTIBODY: CPT

## 2019-03-11 PROCEDURE — 36415 COLL VENOUS BLD VENIPUNCTURE: CPT

## 2019-03-11 PROCEDURE — 86317 IMMUNOASSAY INFECTIOUS AGENT: CPT

## 2019-03-11 PROCEDURE — 87340 HEPATITIS B SURFACE AG IA: CPT

## 2019-03-11 PROCEDURE — 86787 VARICELLA-ZOSTER ANTIBODY: CPT

## 2019-03-11 PROCEDURE — 86762 RUBELLA ANTIBODY: CPT

## 2019-03-11 PROCEDURE — 86765 RUBEOLA ANTIBODY: CPT

## 2019-03-11 ASSESSMENT — ENCOUNTER SYMPTOMS: BACK PAIN: 1

## 2019-03-12 LAB
HBV SURFACE AB TITR SER: 33.17 MIU/ML
HEPATITIS B SURFACE ANTIGEN: NONREACTIVE

## 2019-03-17 LAB
MEASLES IMMUNE (IGG): 3.84
MUV IGG SER QL: 2.38
VZV IGG SER QL IA: 1.8

## 2019-03-19 ENCOUNTER — OFFICE VISIT (OUTPATIENT)
Dept: FAMILY MEDICINE CLINIC | Age: 29
End: 2019-03-19
Payer: MEDICARE

## 2019-03-19 VITALS
OXYGEN SATURATION: 99 % | TEMPERATURE: 97.2 F | HEART RATE: 65 BPM | WEIGHT: 177.2 LBS | DIASTOLIC BLOOD PRESSURE: 68 MMHG | SYSTOLIC BLOOD PRESSURE: 112 MMHG | HEIGHT: 65 IN | RESPIRATION RATE: 19 BRPM | BODY MASS INDEX: 29.52 KG/M2

## 2019-03-19 DIAGNOSIS — Z02.0 SCHOOL PHYSICAL EXAM: Primary | ICD-10-CM

## 2019-03-19 PROBLEM — R73.9 HYPERGLYCEMIA: Status: RESOLVED | Noted: 2018-11-05 | Resolved: 2019-03-19

## 2019-03-19 PROBLEM — R07.89 ATYPICAL CHEST PAIN: Status: RESOLVED | Noted: 2019-01-23 | Resolved: 2019-03-19

## 2019-03-19 PROCEDURE — 90471 IMMUNIZATION ADMIN: CPT | Performed by: FAMILY MEDICINE

## 2019-03-19 PROCEDURE — 90715 TDAP VACCINE 7 YRS/> IM: CPT | Performed by: FAMILY MEDICINE

## 2019-03-19 PROCEDURE — 99395 PREV VISIT EST AGE 18-39: CPT | Performed by: FAMILY MEDICINE

## 2019-03-19 PROCEDURE — G8482 FLU IMMUNIZE ORDER/ADMIN: HCPCS | Performed by: FAMILY MEDICINE

## 2019-03-19 ASSESSMENT — ENCOUNTER SYMPTOMS
VOMITING: 0
ABDOMINAL PAIN: 0
CONSTIPATION: 0
BLOOD IN STOOL: 0
SHORTNESS OF BREATH: 0
DIARRHEA: 0
BACK PAIN: 0
WHEEZING: 0
COLOR CHANGE: 0
SINUS PRESSURE: 0
RHINORRHEA: 0
CHEST TIGHTNESS: 0

## 2019-03-19 ASSESSMENT — PATIENT HEALTH QUESTIONNAIRE - PHQ9
1. LITTLE INTEREST OR PLEASURE IN DOING THINGS: 0
SUM OF ALL RESPONSES TO PHQ QUESTIONS 1-9: 0
SUM OF ALL RESPONSES TO PHQ QUESTIONS 1-9: 0
2. FEELING DOWN, DEPRESSED OR HOPELESS: 0
SUM OF ALL RESPONSES TO PHQ9 QUESTIONS 1 & 2: 0

## 2019-03-19 ASSESSMENT — VISUAL ACUITY
OS_CC: 20/20
OD_CC: 20/20

## 2019-03-20 DIAGNOSIS — K59.01 SLOW TRANSIT CONSTIPATION: ICD-10-CM

## 2019-03-20 RX ORDER — POLYETHYLENE GLYCOL 3350 17 G/17G
17 POWDER, FOR SOLUTION ORAL DAILY PRN
Qty: 1 BOTTLE | Refills: 3 | Status: SHIPPED | OUTPATIENT
Start: 2019-03-20 | End: 2019-07-05 | Stop reason: SDUPTHER

## 2019-04-25 ENCOUNTER — TELEPHONE (OUTPATIENT)
Dept: FAMILY MEDICINE CLINIC | Age: 29
End: 2019-04-25

## 2019-04-25 DIAGNOSIS — J01.81 OTHER ACUTE RECURRENT SINUSITIS: Primary | ICD-10-CM

## 2019-04-25 RX ORDER — CEFUROXIME AXETIL 500 MG/1
500 TABLET ORAL EVERY 12 HOURS
Qty: 20 TABLET | Refills: 0 | Status: SHIPPED | OUTPATIENT
Start: 2019-04-25 | End: 2019-05-05

## 2019-04-25 NOTE — TELEPHONE ENCOUNTER
Patient should have been seen, that is why it wasn't allowed for her to complete the E visit. Please let the patient know to  prescription from pharmacy. If worsening symptoms in 1-2 days or not getting better as expected needs to let us know and make appointment or she needs to go to an urgent care    Requested Prescriptions     Signed Prescriptions Disp Refills    cefUROXime (CEFTIN) 500 MG tablet 20 tablet 0     Sig: Take 1 tablet by mouth every 12 hours for 10 days     Authorizing Provider: Pina [address] [phone]  2012 [address] [facility] [address]  Phone: [phone] Fax: [phone]      Thank you!         FYI    Future Appointments   Date Time Provider Cecelia Garner   8/20/2019 10:00 AM Radha Tolbert MD fp sc [id number]       Controlled Substances Monitoring:

## 2019-05-08 DIAGNOSIS — M25.512 ACUTE PAIN OF LEFT SHOULDER: ICD-10-CM

## 2019-05-08 NOTE — TELEPHONE ENCOUNTER
Please Approve or Refuse.   Send to Pharmacy per Pt's Request:      Next Visit Date:  8/20/2019   Last Visit Date: 3/19/2019    Hemoglobin A1C (%)   Date Value   10/30/2018 5.4   04/09/2015 5.4   10/14/2014 6.0             ( goal A1C is < 7)   BP Readings from Last 3 Encounters:   03/19/19 112/68   03/05/19 127/83   01/18/19 126/82          (goal 120/80)  BUN   Date Value Ref Range Status   01/18/2019 8 6 - 20 mg/dL Final     CREATININE   Date Value Ref Range Status   01/18/2019 0.65 0.50 - 0.90 mg/dL Final     Potassium   Date Value Ref Range Status   01/18/2019 4.1 3.7 - 5.3 mmol/L Final

## 2019-05-10 DIAGNOSIS — R07.89 MUSCULOSKELETAL CHEST PAIN: ICD-10-CM

## 2019-05-10 RX ORDER — CHOLECALCIFEROL (VITAMIN D3) 50 MCG
2000 TABLET ORAL DAILY
Qty: 30 TABLET | Refills: 3 | Status: SHIPPED | OUTPATIENT
Start: 2019-05-10 | End: 2019-08-29 | Stop reason: SDUPTHER

## 2019-05-24 ENCOUNTER — OFFICE VISIT (OUTPATIENT)
Dept: FAMILY MEDICINE CLINIC | Age: 29
End: 2019-05-24
Payer: MEDICARE

## 2019-05-24 VITALS
HEIGHT: 65 IN | BODY MASS INDEX: 31.32 KG/M2 | OXYGEN SATURATION: 98 % | HEART RATE: 89 BPM | WEIGHT: 188 LBS | SYSTOLIC BLOOD PRESSURE: 117 MMHG | DIASTOLIC BLOOD PRESSURE: 91 MMHG

## 2019-05-24 DIAGNOSIS — J01.11 ACUTE RECURRENT FRONTAL SINUSITIS: ICD-10-CM

## 2019-05-24 DIAGNOSIS — J30.89 SEASONAL ALLERGIC RHINITIS DUE TO OTHER ALLERGIC TRIGGER: ICD-10-CM

## 2019-05-24 DIAGNOSIS — R10.11 RUQ PAIN: ICD-10-CM

## 2019-05-24 DIAGNOSIS — R19.7 ACUTE DIARRHEA: ICD-10-CM

## 2019-05-24 DIAGNOSIS — R10.84 GENERALIZED ABDOMINAL PAIN: ICD-10-CM

## 2019-05-24 DIAGNOSIS — K52.9 ACUTE GASTROENTERITIS: Primary | ICD-10-CM

## 2019-05-24 DIAGNOSIS — K21.9 GASTROESOPHAGEAL REFLUX DISEASE WITHOUT ESOPHAGITIS: ICD-10-CM

## 2019-05-24 PROBLEM — Z20.5 EXPOSURE TO HEPATITIS C: Status: RESOLVED | Noted: 2017-04-11 | Resolved: 2019-05-24

## 2019-05-24 PROBLEM — R07.89 MUSCULOSKELETAL CHEST PAIN: Status: RESOLVED | Noted: 2017-05-06 | Resolved: 2019-05-24

## 2019-05-24 PROBLEM — F17.210 CIGARETTE NICOTINE DEPENDENCE WITHOUT COMPLICATION: Status: RESOLVED | Noted: 2019-02-28 | Resolved: 2019-05-24

## 2019-05-24 PROBLEM — J30.2 SEASONAL ALLERGIC RHINITIS: Status: ACTIVE | Noted: 2019-05-24

## 2019-05-24 PROBLEM — F17.200 SMOKES AND MOTIVATED TO QUIT: Status: RESOLVED | Noted: 2017-02-28 | Resolved: 2019-05-24

## 2019-05-24 LAB
BILIRUBIN, POC: NEGATIVE
BLOOD URINE, POC: NEGATIVE
CLARITY, POC: CLEAR
COLOR, POC: YELLOW
GLUCOSE URINE, POC: NEGATIVE
KETONES, POC: NEGATIVE
LEUKOCYTE EST, POC: NEGATIVE
NITRITE, POC: NEGATIVE
PH, POC: 7
PROTEIN, POC: NEGATIVE
SPECIFIC GRAVITY, POC: 1.02
UROBILINOGEN, POC: 0.2

## 2019-05-24 PROCEDURE — 99214 OFFICE O/P EST MOD 30 MIN: CPT | Performed by: FAMILY MEDICINE

## 2019-05-24 PROCEDURE — G8417 CALC BMI ABV UP PARAM F/U: HCPCS | Performed by: FAMILY MEDICINE

## 2019-05-24 PROCEDURE — 4004F PT TOBACCO SCREEN RCVD TLK: CPT | Performed by: FAMILY MEDICINE

## 2019-05-24 PROCEDURE — G8427 DOCREV CUR MEDS BY ELIG CLIN: HCPCS | Performed by: FAMILY MEDICINE

## 2019-05-24 PROCEDURE — 81002 URINALYSIS NONAUTO W/O SCOPE: CPT | Performed by: FAMILY MEDICINE

## 2019-05-24 RX ORDER — SUCRALFATE ORAL 1 G/10ML
1 SUSPENSION ORAL 4 TIMES DAILY
Qty: 1200 ML | Refills: 3 | Status: SHIPPED | OUTPATIENT
Start: 2019-05-24 | End: 2019-08-20 | Stop reason: ALTCHOICE

## 2019-05-24 RX ORDER — AZITHROMYCIN 250 MG/1
TABLET, FILM COATED ORAL
Qty: 6 TABLET | Refills: 0 | Status: SHIPPED | OUTPATIENT
Start: 2019-05-24 | End: 2019-05-29

## 2019-05-24 RX ORDER — MEDICAL SUPPLY, MISCELLANEOUS
EACH MISCELLANEOUS
Qty: 1000 ML | Refills: 3 | Status: SHIPPED | OUTPATIENT
Start: 2019-05-24 | End: 2019-08-20 | Stop reason: ALTCHOICE

## 2019-05-24 RX ORDER — PANTOPRAZOLE SODIUM 40 MG/1
40 TABLET, DELAYED RELEASE ORAL
Qty: 90 TABLET | Refills: 1 | Status: SHIPPED | OUTPATIENT
Start: 2019-05-24 | End: 2019-11-07 | Stop reason: SDUPTHER

## 2019-05-24 RX ORDER — RANITIDINE 150 MG/1
150 CAPSULE ORAL EVERY EVENING
Qty: 90 CAPSULE | Refills: 1 | Status: SHIPPED | OUTPATIENT
Start: 2019-05-24 | End: 2019-11-07 | Stop reason: SDUPTHER

## 2019-05-24 RX ORDER — CETIRIZINE HYDROCHLORIDE 10 MG/1
10 TABLET ORAL DAILY
Qty: 90 TABLET | Refills: 3 | Status: SHIPPED | OUTPATIENT
Start: 2019-05-24 | End: 2020-05-04

## 2019-05-24 ASSESSMENT — ENCOUNTER SYMPTOMS
SHORTNESS OF BREATH: 0
CHOKING: 1
ABDOMINAL PAIN: 1
SINUS PAIN: 1
DIARRHEA: 1
NAUSEA: 1
SINUS PRESSURE: 1
ABDOMINAL DISTENTION: 0
WHEEZING: 0
RHINORRHEA: 1
VOMITING: 1
SORE THROAT: 1
VOICE CHANGE: 1
CHEST TIGHTNESS: 0
COUGH: 1
CONSTIPATION: 0

## 2019-05-24 NOTE — PROGRESS NOTES
Chief Complaint   Patient presents with    Abdominal Pain     always burning     Emesis    Sinus Problem         Patient presents today for an acute visit secondary to Abdominal Pain (always burning ); Emesis; and Sinus Problem   . 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 Onset of GI symptoms about 1 month ago progressively getting worse for the past few days. Suzy Campbell says she has severe heartburn. Everything she is eating makes her have retrosternal burning, constantly, heartburn is all the way down to the esophagus. She also has nausea and vomiting every few days in a row  Cannot eat anything with red sauce or pizza  She also gets diarrhea. Has been having diarrhea for 1 mo, 4-5 bowel movements/day, stool looks \"red and brown\" and watery    Pain in the upper abdomen all across, worse epigastrium and right upper quadrant. Also pain on the flanks and suprapubic   She reports an episode of choking on chicken and she almost needed Heimlich because she couldn't breathe    Has been takingOmeprazole  20 MG once a day, but not helping  Not taking Zantac  Not taking . magnesium for 3 weeks  Nobody else sick at home with similar symptoms    Smoking -decreased to under 1/2 PPD  Stopped  Ibuprofen 1-2 weeks ago  Not drinking currently, but last alcohol was 1 week ago and made the stomach pain worse  Denies fever, chills, night sweats. Reports frequency of urination. Urine is yellow bright, urine volume is decreased.   She has been drinking fluids       There is unintentional weight gain of 12 pounds in 2 months      Wt Readings from Last 3 Encounters:   05/24/19 188 lb (85.3 kg)   03/19/19 177 lb 3.2 oz (80.4 kg)   03/05/19 176 lb 12.8 oz (80.2 kg)       Patient also reports sinus congestion, postnasal drip, rhinorrhea with yellow greenish mucus, sinus pressure and headache over Base) MCG/ACT inhaler Inhale 2 puffs into the lungs every 6 hours as needed for Wheezing or Shortness of Breath 1 Inhaler 0    ranitidine (ZANTAC) 150 MG capsule TAKE 1 TABLET BY MOUTH 2 TIMES DAILY 60 capsule 3    MAPAP 500 MG tablet TAKE 1 TABLET BY MOUTH EVERY 6 HOURS AS NEEDED FOR PAIN 120 tablet 1     No current facility-administered medications for this visit. Social History     Tobacco Use    Smoking status: Current Every Day Smoker     Packs/day: 0.50     Years: 9.00     Pack years: 4.50     Types: Cigarettes    Smokeless tobacco: Never Used   Substance Use Topics    Alcohol use: Yes     Alcohol/week: 0.0 oz     Comment: occasionally    Drug use: No       Ready to quit: No  Counseling given: Yes                    The patient's past medical, surgical, social, and family history as well as her current medications and allergies were reviewed as documented in today's encounter. Rest of complaints with corresponding details per ROS. Review of Systems   Constitutional: Positive for fatigue and unexpected weight change. Negative for activity change, appetite change, chills, diaphoresis and fever. HENT: Positive for congestion, postnasal drip, rhinorrhea, sinus pressure, sinus pain, sore throat and voice change. Negative for ear discharge and ear pain. Respiratory: Positive for cough and choking (on WED , choked on chicken). Negative for chest tightness, shortness of breath and wheezing. Cardiovascular: Negative for chest pain, palpitations and leg swelling. Gastrointestinal: Positive for abdominal pain, diarrhea, nausea and vomiting. Negative for abdominal distention and constipation. Endocrine: Negative for cold intolerance, heat intolerance, polydipsia, polyphagia and polyuria. Genitourinary: Positive for decreased urine volume and frequency. Negative for difficulty urinating and urgency. Allergic/Immunologic: Positive for environmental allergies.    Psychiatric/Behavioral: The patient is nervous/anxious.          -vital signs stable and within normal limits except Obesity per BMI and elevated blood pressure. BP (!) 117/91   Pulse 89   Ht 5' 5\" (1.651 m)   Wt 188 lb (85.3 kg)   LMP 07/04/2012 (Approximate)   SpO2 98%   BMI 31.28 kg/m²        Physical Exam   Constitutional: She is oriented to person, place, and time. She appears well-developed and well-nourished. No distress. HENT:   Head: Normocephalic and atraumatic. Right Ear: External ear and ear canal normal. A middle ear effusion is present. Left Ear: External ear and ear canal normal. A middle ear effusion is present. Nose: Mucosal edema and rhinorrhea (yellow green) present. Right sinus exhibits frontal sinus tenderness. Right sinus exhibits no maxillary sinus tenderness. Left sinus exhibits frontal sinus tenderness. Left sinus exhibits no maxillary sinus tenderness. Mouth/Throat: Mucous membranes are dry. Posterior oropharyngeal erythema present. No oropharyngeal exudate or posterior oropharyngeal edema. Eyes: Conjunctivae and EOM are normal. Right eye exhibits no discharge. Left eye exhibits no discharge. No scleral icterus. Neck: Normal range of motion. Neck supple. No thyromegaly present. Cardiovascular: Normal rate, regular rhythm, normal heart sounds and intact distal pulses. Pulmonary/Chest: Effort normal and breath sounds normal. No respiratory distress. She has no wheezes. She has no rales. She exhibits no tenderness. Abdominal: Soft. Bowel sounds are normal. She exhibits no distension. There is no hepatosplenomegaly. There is generalized tenderness. There is positive Riggs's sign. There is no rigidity, no guarding and no CVA tenderness. Obese abdomen. There is generalized abdominal pain, worse in the right upper quadrant, epigastrium, suprapubic   Musculoskeletal: Normal range of motion. She exhibits no edema or tenderness.    Neurological: She is alert and oriented to person, place, and time. No cranial nerve deficit. She exhibits normal muscle tone. Skin: Skin is warm and dry. Capillary refill takes less than 2 seconds. No rash noted. She is not diaphoretic. Psychiatric: Her behavior is normal. Judgment and thought content normal. Her mood appears anxious. Nursing note and vitals reviewed. I personally reviewed testing with patient. Labs within normal limits      Lab Results   Component Value Date    WBC 10.9 05/02/2017    HGB 14.3 05/02/2017    HCT 40.6 05/02/2017    MCV 90.1 05/02/2017     05/02/2017       Lab Results   Component Value Date     01/18/2019    K 4.1 01/18/2019     01/18/2019    CO2 26 01/18/2019    BUN 8 01/18/2019    CREATININE 0.65 01/18/2019    GLUCOSE 96 01/18/2019    CALCIUM 10.2 01/18/2019        Lab Results   Component Value Date    ALT 21 01/18/2019    AST 20 01/18/2019    ALKPHOS 75 01/18/2019    BILITOT 0.37 01/18/2019       Lab Results   Component Value Date    TSH 1.30 01/18/2019       Lab Results   Component Value Date    LABA1C 5.4 10/30/2018       ASSESSMENT AND PLAN      1. Acute gastroenteritis  worsening    - H. pylori antigen; Future  - Comprehensive Metabolic Panel; Future  - Lipase; Future  - CBC Auto Differential; Future  - Oral Electrolytes (PEDIALYTE) SOLN; 1/2-1 cup q 30 mins while awake x 2 days  Dispense: 1000 mL; Refill: 3  Avoid dehydration  Bran diet discussed  Increase fluids    2. Generalized abdominal pain  worsening  - POCT Urinalysis no Micro-within normal limits   Results for POC orders placed in visit on 05/24/19   POCT Urinalysis no Micro   Result Value Ref Range    Color, UA yellow     Clarity, UA clear     Glucose, UA POC negative     Bilirubin, UA negative     Ketones, UA negative     Spec Grav, UA 1.020     Blood, UA POC negative     pH, UA 7.0     Protein, UA POC negative     Urobilinogen, UA 0.2     Leukocytes, UA negative     Nitrite, UA negative        - Comprehensive Metabolic Panel;  Future  - Lipase; Future  - CBC Auto Differential; Future    3. Gastroesophageal reflux disease without esophagitis  worsening  - H. pylori antigen; Future  - pantoprazole (PROTONIX) 40 MG tablet; Take 1 tablet by mouth every morning (before breakfast)  Dispense: 90 tablet; Refill: 1  - ranitidine (ZANTAC) 150 MG capsule; Take 1 capsule by mouth every evening  Dispense: 90 capsule; Refill: 1  - sucralfate (CARAFATE) 1 GM/10ML suspension; Take 10 mLs by mouth 4 times daily  Dispense: 1200 mL; Refill: 3  - CBC Auto Differential; Future    4. Acute recurrent frontal sinusitis  worsening    - azithromycin (ZITHROMAX) 250 MG tablet; 500 mg orally on day one followed by 250 mg daily on days two through five  Dispense: 6 tablet; Refill: 0    5. Acute diarrhea  Failing to change as expected. - C Diff Toxin by RT PCR; Future  - Stool for WBC #1; Future  - Stool Culture; Future  - Giardia / Cryptosporidum antigens; Future  - Tissue Transglutaminase Antobody IgA W/ Reflex; Future  - CBC Auto Differential; Future  Bran diet  Increase fluids    6. Seasonal allergic rhinitis due to other allergic trigger  worsening    -restart cetirizine (ZYRTEC) 10 MG tablet; Take 1 tablet by mouth daily  Dispense: 90 tablet; Refill: 3  Nasonex prn     7. RUQ pain  worsening    - US Liver; Future-rule out gallstone or liver pathology       Results for POC orders placed in visit on 05/24/19   POCT Urinalysis no Micro   Result Value Ref Range    Color, UA yellow     Clarity, UA clear     Glucose, UA POC negative     Bilirubin, UA negative     Ketones, UA negative     Spec Grav, UA 1.020     Blood, UA POC negative     pH, UA 7.0     Protein, UA POC negative     Urobilinogen, UA 0.2     Leukocytes, UA negative     Nitrite, UA negative            Needs nurse visit appointment for BP check in 1 week.  LETTER FOR SCHOOL TODAY      Orders Placed This Encounter   Procedures    C Diff Toxin by RT PCR     Standing Status:   Future     Standing Expiration Date: 2019    Stool Culture     Standing Status:   Future     Standing Expiration Date:   2019    US Liver     Standing Status:   Future     Standing Expiration Date:   2020     Order Specific Question:   Reason for exam:     Answer:   elevated LFTs    H. pylori antigen     Standing Status:   Future     Standing Expiration Date:   2019    Stool for WBC #1     Standing Status:   Future     Standing Expiration Date:   2019    Giardia / Cryptosporidum antigens     Standing Status:   Future     Standing Expiration Date:   2019    Tissue Transglutaminase Antobody IgA W/ Reflex     Standing Status:   Future     Standing Expiration Date:   2019    Comprehensive Metabolic Panel     Standing Status:   Future     Standing Expiration Date:   2019    Lipase     Standing Status:   Future     Standing Expiration Date:   2019    CBC Auto Differential     Standing Status:   Future     Standing Expiration Date:   2020    POCT Urinalysis no Micro       Orders Placed This Encounter   Medications    pantoprazole (PROTONIX) 40 MG tablet     Sig: Take 1 tablet by mouth every morning (before breakfast)     Dispense:  90 tablet     Refill:  1    ranitidine (ZANTAC) 150 MG capsule     Sig: Take 1 capsule by mouth every evening     Dispense:  90 capsule     Refill:  1    sucralfate (CARAFATE) 1 GM/10ML suspension     Sig: Take 10 mLs by mouth 4 times daily     Dispense:  1200 mL     Refill:  3    azithromycin (ZITHROMAX) 250 MG tablet     Si mg orally on day one followed by 250 mg daily on days two through five     Dispense:  6 tablet     Refill:  0    cetirizine (ZYRTEC) 10 MG tablet     Sig: Take 1 tablet by mouth daily     Dispense:  90 tablet     Refill:  3    Oral Electrolytes (PEDIALYTE) SOLN     Si/2-1 cup q 30 mins while awake x 2 days     Dispense:  1000 mL     Refill:  3       Medications Discontinued During This Encounter   Medication Reason    magnesium oxide (MAG-OX) 400 (240 Mg) MG tablet Stop Taking at Discharge    topiramate (TOPAMAX) 25 MG tablet Therapy completed    omeprazole 20 MG EC tablet Stop Taking at Discharge    ranitidine (ZANTAC) 150 MG capsule REORDER    cetirizine (ZYRTEC) 10 MG tablet REORDER       Natasha received counseling on the following healthy behaviors: nutrition, exercise, medication adherence and tobacco cessation  Reviewed prior labs and health maintenance. Continue current medications, diet and exercise. Discussed use, benefit, and side effects of prescribed medications. Barriers to medication compliance addressed. Patient given educational materials - see patient instructions. All patient questions answered. Patient voiced understanding. Thepatient's past medical, surgical, social, and family history as well as her   current medications and allergies were reviewed as documented in today's encounter. Medications, labs, diagnostic studies,consultations and follow-up as documented in this encounter. No follow-ups on file. Patient was seen with total face to face time of  25 minutes. More than 50% of this visit was counseling and education. Future Appointments   Date Time Provider Cecelia Garner   5/31/2019 10:30 AM SCHEDULE, [facility]   8/20/2019 10:00 AM Lanie Gonzalez MD [facility]     This note was completed by using the assistance of a speech-recognition program. However, inadvertent computerized transcription errors may be present. Although every effort was made to ensure accuracy, no guarantees can be provided that every mistake has been identified and corrected by editing.     Electronically signed by Lanie Gonzalez MD on 5/24/2019 at 12:57 PM

## 2019-05-24 NOTE — PROGRESS NOTES
Visit Information    Have you changed or started any medications since your last visit including any over-the-counter medicines, vitamins, or herbal medicines? no   Are you having any side effects from any of your medications? -  no  Have you stopped taking any of your medications? Is so, why? -  no    Have you seen any other physician or provider since your last visit? No  Have you had any other diagnostic tests since your last visit? No  Have you been seen in the emergency room and/or had an admission to a hospital since we last saw you? No  Have you had your routine dental cleaning in the past 6 months? no    Have you activated your [employer] account? If not, what are your barriers?  Yes     Patient Care Team:  Reagan Wilburn MD as PCP - General  Reagan Wilburn MD as PCP - S Attributed Provider  Africa Fernández DO as Consulting Physician (Obstetrics & Gynecology)  Teetee Ramires MD as Consulting Physician (Urology)    Medical History Review  Past Medical, Family, and Social History reviewed and does contribute to the patient presenting condition    Health Maintenance   Topic Date Due    A1C test (Diabetic or Prediabetic)  10/30/2019    DTaP/Tdap/Td vaccine (2 - Td) 03/19/2029    Flu vaccine  Completed    Pneumococcal 0-64 years Vaccine  Completed    HIV screen  Completed    Varicella Vaccine  Discontinued

## 2019-05-24 NOTE — RESULT ENCOUNTER NOTE
Mychart comment sent to patient.     Point-of-care UA within normal limits, discussed report at the appointment today Wheelchair/Stroller

## 2019-05-24 NOTE — PATIENT INSTRUCTIONS
Patient Education        Diarrhea: Care Instructions  Your Care Instructions    Diarrhea is loose, watery stools (bowel movements). The exact cause is often hard to find. Sometimes diarrhea is your body's way of getting rid of what caused an upset stomach. Viruses, food poisoning, and many medicines can cause diarrhea. Some people get diarrhea in response to emotional stress, anxiety, or certain foods. Almost everyone has diarrhea now and then. It usually isn't serious, and your stools will return to normal soon. The important thing to do is replace the fluids you have lost, so you can prevent dehydration. The doctor has checked you carefully, but problems can develop later. If you notice any problems or new symptoms, get medical treatment right away. Follow-up care is a key part of your treatment and safety. Be sure to make and go to all appointments, and call your doctor if you are having problems. It's also a good idea to know your test results and keep a list of the medicines you take. How can you care for yourself at home? · Watch for signs of dehydration, which means your body has lost too much water. Dehydration is a serious condition and should be treated right away. Signs of dehydration are:  ? Increasing thirst and dry eyes and mouth. ? Feeling faint or lightheaded. ? Darker urine, and a smaller amount of urine than normal.  · To prevent dehydration, drink plenty of fluids, enough so that your urine is light yellow or clear like water. Choose water and other caffeine-free clear liquids until you feel better. If you have kidney, heart, or liver disease and have to limit fluids, talk with your doctor before you increase the amount of fluids you drink. · Begin eating small amounts of mild foods the next day, if you feel like it. ? Try yogurt that has live cultures of Lactobacillus. (Check the label.)  ?  Avoid spicy foods, fruits, alcohol, and caffeine until 48 hours after all symptoms are gone.  ? Avoid chewing gum that contains sorbitol. ? Avoid dairy products (except for yogurt with Lactobacillus) while you have diarrhea and for 3 days after symptoms are gone. · The doctor may recommend that you take over-the-counter medicine, such as loperamide (Imodium), if you still have diarrhea after 6 hours. Read and follow all instructions on the label. Do not use this medicine if you have bloody diarrhea, a high fever, or other signs of serious illness. Call your doctor if you think you are having a problem with your medicine. When should you call for help? Call 911 anytime you think you may need emergency care. For example, call if:    · You passed out (lost consciousness).     · Your stools are maroon or very bloody.    Call your doctor now or seek immediate medical care if:    · You are dizzy or lightheaded, or you feel like you may faint.     · Your stools are black and look like tar, or they have streaks of blood.     · You have new or worse belly pain.     · You have symptoms of dehydration, such as:  ? Dry eyes and a dry mouth. ? Passing only a little dark urine. ? Feeling thirstier than usual.     · You have a new or higher fever.    Watch closely for changes in your health, and be sure to contact your doctor if:    · Your diarrhea is getting worse.     · You see pus in the diarrhea.     · You are not getting better after 2 days (48 hours). Where can you learn more? Go to [email or web address] [facility] [email or web address] org and sign in to your [employer] account. Enter D419 in the [facility] box to learn more about \"Diarrhea: Care Instructions. \"     If you do not have an account, please click on the \"Sign Up Now\" link. Current as of: September 23, 2018  Content Version: 12.0  © 5488-2501 [employer]. Care instructions adapted under license by [facility] ([facility]).  If you have questions about a medical condition or this instruction, always ask your healthcare professional. [employer] disclaims any warranty or liability for your use of this information.

## 2019-05-29 ENCOUNTER — HOSPITAL ENCOUNTER (OUTPATIENT)
Age: 29
Discharge: HOME | End: 2019-05-29
Payer: MEDICARE

## 2019-05-29 DIAGNOSIS — K52.9 ACUTE GASTROENTERITIS: ICD-10-CM

## 2019-05-29 DIAGNOSIS — R10.84 GENERALIZED ABDOMINAL PAIN: ICD-10-CM

## 2019-05-29 DIAGNOSIS — R19.7 ACUTE DIARRHEA: ICD-10-CM

## 2019-05-29 DIAGNOSIS — K21.9 GASTROESOPHAGEAL REFLUX DISEASE WITHOUT ESOPHAGITIS: ICD-10-CM

## 2019-05-29 LAB
ABSOLUTE EOS #: 0.2 K/UL (ref 0–0.4)
ABSOLUTE IMMATURE GRANULOCYTE: ABNORMAL K/UL (ref 0–0.3)
ABSOLUTE LYMPH #: 2.8 K/UL (ref 1–4.8)
ABSOLUTE MONO #: 0.4 K/UL (ref 0.1–1.3)
ALBUMIN SERPL-MCNC: 4.6 G/DL (ref 3.5–5.2)
ALBUMIN/GLOBULIN RATIO: ABNORMAL (ref 1–2.5)
ALP BLD-CCNC: 82 U/L (ref 35–104)
ALT SERPL-CCNC: 27 U/L (ref 5–33)
ANION GAP SERPL CALCULATED.3IONS-SCNC: 10 MMOL/L (ref 9–17)
AST SERPL-CCNC: 22 U/L
BASOPHILS # BLD: 0 % (ref 0–2)
BASOPHILS ABSOLUTE: 0 K/UL (ref 0–0.2)
BILIRUB SERPL-MCNC: 0.34 MG/DL (ref 0.3–1.2)
BUN BLDV-MCNC: 13 MG/DL (ref 6–20)
BUN/CREAT BLD: ABNORMAL (ref 9–20)
CALCIUM SERPL-MCNC: 9.5 MG/DL (ref 8.6–10.4)
CHLORIDE BLD-SCNC: 100 MMOL/L (ref 98–107)
CO2: 26 MMOL/L (ref 20–31)
CREAT SERPL-MCNC: 0.6 MG/DL (ref 0.5–0.9)
DIFFERENTIAL TYPE: ABNORMAL
EOSINOPHILS RELATIVE PERCENT: 2 % (ref 0–4)
GFR AFRICAN AMERICAN: >60 ML/MIN
GFR NON-AFRICAN AMERICAN: >60 ML/MIN
GFR SERPL CREATININE-BSD FRML MDRD: ABNORMAL ML/MIN/{1.73_M2}
GFR SERPL CREATININE-BSD FRML MDRD: ABNORMAL ML/MIN/{1.73_M2}
GLUCOSE BLD-MCNC: 100 MG/DL (ref 70–99)
HCT VFR BLD CALC: 42.4 % (ref 36–46)
HEMOGLOBIN: 14.6 G/DL (ref 12–16)
IMMATURE GRANULOCYTES: ABNORMAL %
LIPASE: 48 U/L (ref 13–60)
LYMPHOCYTES # BLD: 27 % (ref 24–44)
MCH RBC QN AUTO: 31.9 PG (ref 26–34)
MCHC RBC AUTO-ENTMCNC: 34.4 G/DL (ref 31–37)
MCV RBC AUTO: 92.9 FL (ref 80–100)
MONOCYTES # BLD: 4 % (ref 1–7)
NRBC AUTOMATED: ABNORMAL PER 100 WBC
PDW BLD-RTO: 12.6 % (ref 11.5–14.9)
PLATELET # BLD: 322 K/UL (ref 150–450)
PLATELET ESTIMATE: ABNORMAL
PMV BLD AUTO: 8.1 FL (ref 6–12)
POTASSIUM SERPL-SCNC: 4.5 MMOL/L (ref 3.7–5.3)
RBC # BLD: 4.56 M/UL (ref 4–5.2)
RBC # BLD: ABNORMAL 10*6/UL
SEG NEUTROPHILS: 67 % (ref 36–66)
SEGMENTED NEUTROPHILS ABSOLUTE COUNT: 7.1 K/UL (ref 1.3–9.1)
SODIUM BLD-SCNC: 136 MMOL/L (ref 135–144)
TOTAL PROTEIN: 8 G/DL (ref 6.4–8.3)
WBC # BLD: 10.6 K/UL (ref 3.5–11)
WBC # BLD: ABNORMAL 10*3/UL

## 2019-05-29 PROCEDURE — 85025 COMPLETE CBC W/AUTO DIFF WBC: CPT

## 2019-05-29 PROCEDURE — 83516 IMMUNOASSAY NONANTIBODY: CPT

## 2019-05-29 PROCEDURE — 80053 COMPREHEN METABOLIC PANEL: CPT

## 2019-05-29 PROCEDURE — 36415 COLL VENOUS BLD VENIPUNCTURE: CPT

## 2019-05-29 PROCEDURE — 83690 ASSAY OF LIPASE: CPT

## 2019-05-30 LAB — TISSUE TRANSGLUTAMINASE IGA: 0.6 U/ML

## 2019-06-06 DIAGNOSIS — M25.512 ACUTE PAIN OF LEFT SHOULDER: ICD-10-CM

## 2019-08-20 ENCOUNTER — OFFICE VISIT (OUTPATIENT)
Dept: FAMILY MEDICINE CLINIC | Age: 29
End: 2019-08-20
Payer: MEDICARE

## 2019-08-20 VITALS
OXYGEN SATURATION: 97 % | SYSTOLIC BLOOD PRESSURE: 122 MMHG | HEART RATE: 86 BPM | WEIGHT: 203.6 LBS | HEIGHT: 65 IN | DIASTOLIC BLOOD PRESSURE: 82 MMHG | BODY MASS INDEX: 33.92 KG/M2

## 2019-08-20 DIAGNOSIS — E66.9 OBESITY (BMI 30.0-34.9): ICD-10-CM

## 2019-08-20 DIAGNOSIS — F31.81 BIPOLAR II DISORDER (HCC): ICD-10-CM

## 2019-08-20 DIAGNOSIS — F41.1 GAD (GENERALIZED ANXIETY DISORDER): ICD-10-CM

## 2019-08-20 DIAGNOSIS — R63.5 UNINTENDED WEIGHT GAIN: Primary | ICD-10-CM

## 2019-08-20 PROCEDURE — 96160 PT-FOCUSED HLTH RISK ASSMT: CPT | Performed by: FAMILY MEDICINE

## 2019-08-20 PROCEDURE — G8427 DOCREV CUR MEDS BY ELIG CLIN: HCPCS | Performed by: FAMILY MEDICINE

## 2019-08-20 PROCEDURE — 4004F PT TOBACCO SCREEN RCVD TLK: CPT | Performed by: FAMILY MEDICINE

## 2019-08-20 PROCEDURE — G8417 CALC BMI ABV UP PARAM F/U: HCPCS | Performed by: FAMILY MEDICINE

## 2019-08-20 PROCEDURE — 99213 OFFICE O/P EST LOW 20 MIN: CPT | Performed by: FAMILY MEDICINE

## 2019-08-20 RX ORDER — PHENTERMINE HYDROCHLORIDE 37.5 MG/1
37.5 TABLET ORAL
Qty: 30 TABLET | Refills: 0 | Status: SHIPPED | OUTPATIENT
Start: 2019-08-20 | End: 2019-09-24 | Stop reason: SDUPTHER

## 2019-08-20 ASSESSMENT — ENCOUNTER SYMPTOMS
SHORTNESS OF BREATH: 0
ABDOMINAL PAIN: 0
NAUSEA: 0
COUGH: 0
VOMITING: 0
CHEST TIGHTNESS: 0

## 2019-08-20 ASSESSMENT — PATIENT HEALTH QUESTIONNAIRE - PHQ9
SUM OF ALL RESPONSES TO PHQ QUESTIONS 1-9: 6
6. FEELING BAD ABOUT YOURSELF - OR THAT YOU ARE A FAILURE OR HAVE LET YOURSELF OR YOUR FAMILY DOWN: 0
7. TROUBLE CONCENTRATING ON THINGS, SUCH AS READING THE NEWSPAPER OR WATCHING TELEVISION: 1
9. THOUGHTS THAT YOU WOULD BE BETTER OFF DEAD, OR OF HURTING YOURSELF: 0
4. FEELING TIRED OR HAVING LITTLE ENERGY: 1
2. FEELING DOWN, DEPRESSED OR HOPELESS: 1
1. LITTLE INTEREST OR PLEASURE IN DOING THINGS: 0
5. POOR APPETITE OR OVEREATING: 2
10. IF YOU CHECKED OFF ANY PROBLEMS, HOW DIFFICULT HAVE THESE PROBLEMS MADE IT FOR YOU TO DO YOUR WORK, TAKE CARE OF THINGS AT HOME, OR GET ALONG WITH OTHER PEOPLE: 0
8. MOVING OR SPEAKING SO SLOWLY THAT OTHER PEOPLE COULD HAVE NOTICED. OR THE OPPOSITE, BEING SO FIGETY OR RESTLESS THAT YOU HAVE BEEN MOVING AROUND A LOT MORE THAN USUAL: 0
3. TROUBLE FALLING OR STAYING ASLEEP: 1
SUM OF ALL RESPONSES TO PHQ9 QUESTIONS 1 & 2: 1
SUM OF ALL RESPONSES TO PHQ QUESTIONS 1-9: 6

## 2019-08-20 ASSESSMENT — ANXIETY QUESTIONNAIRES
4. TROUBLE RELAXING: 1-SEVERAL DAYS
GAD7 TOTAL SCORE: 5
2. NOT BEING ABLE TO STOP OR CONTROL WORRYING: 1-SEVERAL DAYS
5. BEING SO RESTLESS THAT IT IS HARD TO SIT STILL: 1-SEVERAL DAYS
3. WORRYING TOO MUCH ABOUT DIFFERENT THINGS: 1-SEVERAL DAYS
6. BECOMING EASILY ANNOYED OR IRRITABLE: 1-SEVERAL DAYS
7. FEELING AFRAID AS IF SOMETHING AWFUL MIGHT HAPPEN: 0-NOT AT ALL SURE
1. FEELING NERVOUS, ANXIOUS, OR ON EDGE: 0-NOT AT ALL SURE

## 2019-08-20 NOTE — PROGRESS NOTES
Chief Complaint   Patient presents with    Weight Management     adipex     Discuss Medications     increase lexapro          Natasha Haywood    here today for follow up on chronic medical problems, go over labs and/or diagnostic studies, and medication refills. Weight Management (adipex ) and Discuss Medications (increase lexapro )      HPI      Wants to lose weight. Natasha Haywood has made a substantial good daniel effort to lose weight in a regimen for weight reduction based on caloric restriction, nutritional changes, and exercise  Natasha Haywood reports he did: Eat low-carb low-fat diet, stop drinking pop, but she has been Snacking a lot when at school due to stress going through school    Contraindications to controlled substance anorexiant: NONE      Natasha Haywood reports no  use of illegal drug substances  Natasha Haywood reports alcohol use of :none    OARRS done today and OK  Plan: diet and exercise    Patient informed that when prescribed a controlled substance for weight loss, the provider is required by law to see the patient for an appointment every thirty days. This is neccessary to record the weight and blood pressure and to assess patient's efforts to lose weight, and to ensure there are no contraindications or adverse effects. BP controlled. BP Readings from Last 3 Encounters:   08/20/19 122/82   05/24/19 (!) 117/91   03/19/19 112/68    Pulse controlled. Pulse Readings from Last 3 Encounters:   08/20/19 86   05/24/19 89   03/19/19 65     There is unintentional weight gain of 15 pounds in 3 months. She has been unable to lose the weight. Wt Readings from Last 3 Encounters:   08/20/19 203 lb 9.6 oz (92.4 kg)   05/24/19 188 lb (85.3 kg)   03/19/19 177 lb 3.2 oz (80.4 kg)     Obesity per BMI. Body mass index is 33.88 kg/m².       Exercise Tracking - Detailed 8/20/2019   Walking Duration -   Walking Intensity -   Biking Duration -   Biking Intensity -   Running Duration -   Swimming things at home, or get along with other people?: Not difficult at all  PHQ-9 Completed?: Complete  PHQ-9 Total Score: 6        PHQ Scores 8/20/2019 3/19/2019 3/5/2019 12/18/2018 11/20/2018 10/30/2018 11/16/2017   PHQ2 Score 1 0 1 0 0 0 0   PHQ9 Score 6 0 6 0 0 0 0      [x]5-9 = Mild depression    Mild anxiety improving    GABBY 7 SCORE 8/20/2019 3/5/2019   GABBY-7 Total Score 5 12     Interpretation of GABBY-7 score: 5-9 = mild anxiety, 10-14 = moderate anxiety, 15+ = severe anxiety. Recommend referral to behavioral health for scores 10 or greater.    -rest of complaints with corresponding details per ROS    The patient's past medical,surgical, social, and family history as well as her current medications and allergies were reviewed as documented in today's encounter. Review of Systems   Constitutional: Positive for fatigue and unexpected weight change. Negative for activity change, appetite change, chills, diaphoresis and fever. Respiratory: Negative for cough, chest tightness and shortness of breath. Cardiovascular: Negative for chest pain, palpitations and leg swelling. Gastrointestinal: Negative for abdominal pain, nausea and vomiting. Endocrine: Positive for polyphagia (due to stress). Negative for cold intolerance, heat intolerance, polydipsia and polyuria. Psychiatric/Behavioral: Positive for decreased concentration, dysphoric mood and sleep disturbance. Negative for self-injury. The patient is nervous/anxious.            -vital signs stable and within normal limits except Obesity per BMI. /82   Pulse 86   Ht 5' 5\" (1.651 m)   Wt 203 lb 9.6 oz (92.4 kg)   LMP 07/04/2012 (Approximate)   SpO2 97%   BMI 33.88 kg/m²      Physical Exam   Constitutional: She is oriented to person, place, and time. She appears well-developed and well-nourished. No distress. HENT:   Head: Normocephalic and atraumatic. Mouth/Throat: Oropharynx is clear and moist. No oropharyngeal exudate.    Eyes: with a specialist.           One more adipex in 3 mo    No orders of the defined types were placed in this encounter. Medications Discontinued During This Encounter   Medication Reason    Oral Electrolytes (PEDIALYTE) SOLN Therapy completed    sucralfate (CARAFATE) 1 GM/10ML suspension Therapy completed       Natasha received counseling on the following healthy behaviors: nutrition, exercise, medication adherence, tobacco cessation and weight loss    Reviewed prior labs and health maintenance  Continue current medications, diet and exercise. Discussed use, benefit, and side effects of prescribed medications. Barriers to medication compliance addressed. Patient given educational materials - see patient instructions  Was a self-tracking handout given in paper form or via [employer]? No      Requested Prescriptions     Signed Prescriptions Disp Refills    phentermine (ADIPEX-P) 37.5 MG tablet 30 tablet 0     Sig: Take 1 tablet by mouth every morning (before breakfast) for 30 days. All patient questions answered. Patient voiced understanding. Quality Measures    Body mass index is 33.88 kg/m². Elevated. Weight control planned discussed conventional weight loss, daily exercise regimen, start Adipex, and Healthy diet and regular exercise. BP: 122/82 Blood pressure is normal. Treatment plan consists of No treatment change needed.     No results found for: LDLCALC, LDLCHOLESTEROL, LDLDIRECT (goal LDL reduction with dx if diabetes is 50% LDL reduction)      PHQ Scores 8/20/2019 3/19/2019 3/5/2019 12/18/2018 11/20/2018 10/30/2018 11/16/2017   PHQ2 Score 1 0 1 0 0 0 0   PHQ9 Score 6 0 6 0 0 0 0     Interpretation of Total Score Depression Severity: 1-4 = Minimal depression, 5-9 = Mild depression, 10-14 = Moderate depression, 15-19 = Moderately severe depression, 20-27 = Severe depression    The patient's past medical, surgical, social, and family history as well as her   current medicationsand allergies

## 2019-08-20 NOTE — PATIENT INSTRUCTIONS
during treatment. You should not breast-feed while using phentermine. Tell your doctor if you have ever had:  · heart disease or coronary artery disease;  · a heart valve disorder;  · high blood pressure;  · diabetes (your diabetes medication dose may need to be adjusted); or  · kidney disease. Phentermine is not approved for use by anyone younger than 12years old. How should I take phentermine? Follow all directions on your prescription label and read all medication guides or instruction sheets. Your doctor may occasionally change your dose. Use the medicine exactly as directed. Phentermine is usually taken before breakfast, or 1 to 2 hours after breakfast. Follow your doctor's dosing instructions very carefully. Never use phentermine in larger amounts, or for longer than prescribed. Taking more of this medication will not make it more effective and can cause serious, life-threatening side effects. Phentermine is for short-term use only. The effects of appetite suppression may wear off after a few weeks. Phentermine may be habit-forming. Misuse can cause addiction, overdose, or death. Selling or giving away this medicine is against the law. Call your doctor at once if you think this medicine is not working as well, or if you have not lost at least 4 pounds within 4 weeks. Do not stop using phentermine suddenly, or you could have unpleasant withdrawal symptoms. Ask your doctor how to safely stop using this medicine. Store at room temperature away from moisture and heat. Keep the bottle tightly closed when not in use. What happens if I miss a dose? Take the medicine as soon as you can, but skip the missed dose if it is late in the day. Do not  take two doses at one time. What happens if I overdose? Seek emergency medical attention or call the Poison Help line at 1-900.589.3537.  An overdose of phentermine can be fatal.  Overdose symptoms may include confusion, panic, hallucinations, extreme condition or this instruction, always ask your healthcare professional. [place] [address] any warranty or liability for your use of this information.

## 2019-08-21 PROBLEM — R63.5 UNINTENDED WEIGHT GAIN: Status: ACTIVE | Noted: 2019-08-21

## 2019-08-27 DIAGNOSIS — K59.01 SLOW TRANSIT CONSTIPATION: ICD-10-CM

## 2019-08-27 RX ORDER — POLYETHYLENE GLYCOL 3350 17 G/17G
17 POWDER, FOR SOLUTION ORAL DAILY PRN
Qty: 238 G | Refills: 0 | Status: ON HOLD | OUTPATIENT
Start: 2019-08-27 | End: 2019-12-06 | Stop reason: ALTCHOICE

## 2019-08-27 NOTE — TELEPHONE ENCOUNTER
Please Approve or Refuse.   Send to Pharmacy per Pt's Request:      Next Visit Date:  9/20/2019   Last Visit Date: 8/20/2019    Hemoglobin A1C (%)   Date Value   10/30/2018 5.4   04/09/2015 5.4   10/14/2014 6.0             ( goal A1C is < 7)   BP Readings from Last 3 Encounters:   08/20/19 122/82   05/24/19 (!) 117/91   03/19/19 112/68          (goal 120/80)  BUN   Date Value Ref Range Status   05/29/2019 13 6 - 20 mg/dL Final     CREATININE   Date Value Ref Range Status   05/29/2019 0.60 0.50 - 0.90 mg/dL Final     Potassium   Date Value Ref Range Status   05/29/2019 4.5 3.7 - 5.3 mmol/L Final

## 2019-08-29 ENCOUNTER — TELEPHONE (OUTPATIENT)
Dept: FAMILY MEDICINE CLINIC | Age: 29
End: 2019-08-29

## 2019-08-29 DIAGNOSIS — M25.512 ACUTE PAIN OF LEFT SHOULDER: ICD-10-CM

## 2019-08-29 DIAGNOSIS — R07.89 MUSCULOSKELETAL CHEST PAIN: ICD-10-CM

## 2019-08-29 DIAGNOSIS — M54.50 CHRONIC BILATERAL LOW BACK PAIN WITHOUT SCIATICA: Primary | ICD-10-CM

## 2019-08-29 DIAGNOSIS — J01.81 OTHER ACUTE RECURRENT SINUSITIS: Primary | ICD-10-CM

## 2019-08-29 DIAGNOSIS — G89.29 CHRONIC BILATERAL LOW BACK PAIN WITHOUT SCIATICA: Primary | ICD-10-CM

## 2019-08-29 RX ORDER — AZITHROMYCIN 250 MG/1
TABLET, FILM COATED ORAL
Qty: 6 TABLET | Refills: 0 | Status: SHIPPED | OUTPATIENT
Start: 2019-08-29 | End: 2019-09-03

## 2019-08-29 RX ORDER — CHOLECALCIFEROL (VITAMIN D3) 50 MCG
2000 TABLET ORAL DAILY
Qty: 30 TABLET | Refills: 3 | Status: SHIPPED | OUTPATIENT
Start: 2019-08-29 | End: 2020-05-27

## 2019-09-24 ENCOUNTER — OFFICE VISIT (OUTPATIENT)
Dept: FAMILY MEDICINE CLINIC | Age: 29
End: 2019-09-24
Payer: MEDICARE

## 2019-09-24 VITALS
DIASTOLIC BLOOD PRESSURE: 86 MMHG | WEIGHT: 197.6 LBS | BODY MASS INDEX: 32.92 KG/M2 | OXYGEN SATURATION: 98 % | SYSTOLIC BLOOD PRESSURE: 134 MMHG | HEART RATE: 90 BPM | HEIGHT: 65 IN

## 2019-09-24 DIAGNOSIS — E66.9 OBESITY (BMI 30.0-34.9): Primary | ICD-10-CM

## 2019-09-24 DIAGNOSIS — F31.81 BIPOLAR II DISORDER (HCC): ICD-10-CM

## 2019-09-24 PROBLEM — R10.11 RUQ PAIN: Status: RESOLVED | Noted: 2019-05-24 | Resolved: 2019-09-24

## 2019-09-24 PROBLEM — R63.5 UNINTENDED WEIGHT GAIN: Status: RESOLVED | Noted: 2019-08-21 | Resolved: 2019-09-24

## 2019-09-24 PROCEDURE — G8427 DOCREV CUR MEDS BY ELIG CLIN: HCPCS | Performed by: FAMILY MEDICINE

## 2019-09-24 PROCEDURE — 99213 OFFICE O/P EST LOW 20 MIN: CPT | Performed by: FAMILY MEDICINE

## 2019-09-24 PROCEDURE — G8417 CALC BMI ABV UP PARAM F/U: HCPCS | Performed by: FAMILY MEDICINE

## 2019-09-24 PROCEDURE — 4004F PT TOBACCO SCREEN RCVD TLK: CPT | Performed by: FAMILY MEDICINE

## 2019-09-24 RX ORDER — PHENTERMINE HYDROCHLORIDE 37.5 MG/1
37.5 TABLET ORAL
Qty: 30 TABLET | Refills: 0 | Status: SHIPPED | OUTPATIENT
Start: 2019-09-24 | End: 2019-10-24

## 2019-09-24 ASSESSMENT — ENCOUNTER SYMPTOMS
WHEEZING: 0
SHORTNESS OF BREATH: 0
ABDOMINAL PAIN: 0
CHEST TIGHTNESS: 0
COUGH: 0

## 2019-09-24 ASSESSMENT — PATIENT HEALTH QUESTIONNAIRE - PHQ9
SUM OF ALL RESPONSES TO PHQ QUESTIONS 1-9: 0
SUM OF ALL RESPONSES TO PHQ9 QUESTIONS 1 & 2: 0
SUM OF ALL RESPONSES TO PHQ QUESTIONS 1-9: 0
2. FEELING DOWN, DEPRESSED OR HOPELESS: 0
1. LITTLE INTEREST OR PLEASURE IN DOING THINGS: 0

## 2019-09-24 NOTE — PROGRESS NOTES
Neurological: Negative for tremors and headaches. Psychiatric/Behavioral: Negative for dysphoric mood and sleep disturbance. The patient is nervous/anxious.          -vital signs stable and within normal limits except Obesity per BMI. /86   Pulse 90   Ht 5' 5\" (1.651 m)   Wt 197 lb 9.6 oz (89.6 kg)   LMP 07/04/2012 (Approximate)   SpO2 98%   BMI 32.88 kg/m²        Physical Exam   Constitutional: She is oriented to person, place, and time. She appears well-developed and well-nourished. No distress. HENT:   Head: Normocephalic and atraumatic. Eyes: Conjunctivae and EOM are normal. Right eye exhibits no discharge. Left eye exhibits no discharge. Neck: Normal range of motion. Neck supple. Cardiovascular: Normal rate, regular rhythm and normal heart sounds. Pulmonary/Chest: Effort normal. No respiratory distress. Abdominal: Soft. Bowel sounds are normal. She exhibits no distension. There is no tenderness. Obese abdomen. Neurological: She is alert and oriented to person, place, and time. Skin: Skin is warm and dry. Capillary refill takes less than 2 seconds. She is not diaphoretic. Psychiatric: She has a normal mood and affect. Her behavior is normal. Judgment and thought content normal.   Nursing note and vitals reviewed. Mild Hyperglycemia  Prior prediabetes resolved  Lab Results   Component Value Date    LABA1C 5.4 10/30/2018    LABA1C 5.4 04/09/2015    LABA1C 6.0 10/14/2014       Lab Results   Component Value Date     05/29/2019    K 4.5 05/29/2019     05/29/2019    CO2 26 05/29/2019    BUN 13 05/29/2019    CREATININE 0.60 05/29/2019    GLUCOSE 100 05/29/2019    CALCIUM 9.5 05/29/2019        ASSESSMENT AND PLAN      1. Obesity (BMI 30.0-34. 9)  Improving  - phentermine (ADIPEX-P) 37.5 MG tablet; Take 1 tablet by mouth every morning (before breakfast) for 30 days. Dispense: 30 tablet;  Refill: 0    Patient was asked about her current diet and exercise habits, and personalized advice was provided regarding recommended lifestyle changes. Patient's comorbid health conditions associated with elevated BMI were discussed, including hyperglycemia and mood disorder, as well as the likely benefits of weight loss. Based upon patient's motivation to change her behavior, the following plan was agreed upon to work toward a weight loss goal of  1-2 pounds/week: low carbohydrate diet, wear a pedometer and get at least 10,000 steps per day and medication prescribed: Adipex. Educational materials for  weight loss were provided. Patient will follow-up in 1 month(s) with PCP. Provider spent  10 minutes counseling patient. 2. Bipolar II disorder ([facility] [address]  Improving   Will continue to monitor. PHQ Scores 9/24/2019 8/20/2019 3/19/2019 3/5/2019 12/18/2018 11/20/2018 10/30/2018   PHQ2 Score 0 1 0 1 0 0 0   PHQ9 Score 0 6 0 6 0 0 0     Interpretation of Total Score Depression Severity: 1-4 = Minimal depression, 5-9 = Mild depression, 10-14 = Moderate depression, 15-19 = Moderately severe depression, 20-27 = Severe depression        Controlled Substance Monitoring:    Acute and Chronic Pain Monitoring:   RX Monitoring 9/24/2019   Attestation -   Periodic Controlled Substance Monitoring Possible medication side effects, risk of tolerance/dependence & alternative treatments discussed. ;No signs of potential drug abuse or diversion identified. ;Assessed functional status. Chronic Pain > 50 MEDD -             Data Unavailable    No orders of the defined types were placed in this encounter. Orders Placed This Encounter   Medications    phentermine (ADIPEX-P) 37.5 MG tablet     Sig: Take 1 tablet by mouth every morning (before breakfast) for 30 days.      Dispense:  30 tablet     Refill:  0       Medications Discontinued During This Encounter   Medication Reason    phentermine (ADIPEX-P) 37.5 MG tablet REORDER       Natasha received counseling on the following healthy behaviors: nutrition,

## 2019-09-30 ENCOUNTER — TELEPHONE (OUTPATIENT)
Dept: FAMILY MEDICINE CLINIC | Age: 29
End: 2019-09-30

## 2019-09-30 DIAGNOSIS — M77.8 TENDINITIS OF RIGHT WRIST: Primary | ICD-10-CM

## 2019-10-24 DIAGNOSIS — G44.229 CHRONIC TENSION-TYPE HEADACHE, NOT INTRACTABLE: ICD-10-CM

## 2019-11-04 ENCOUNTER — E-VISIT (OUTPATIENT)
Dept: FAMILY MEDICINE CLINIC | Age: 29
End: 2019-11-04
Payer: MEDICARE

## 2019-11-04 DIAGNOSIS — J20.9 ACUTE BRONCHITIS DUE TO INFECTION: Primary | ICD-10-CM

## 2019-11-04 PROCEDURE — 99444 PR PHYSICIAN ONLINE EVALUATION & MANAGEMENT SERVICE: CPT | Performed by: FAMILY MEDICINE

## 2019-11-04 RX ORDER — AZITHROMYCIN 250 MG/1
TABLET, FILM COATED ORAL
Qty: 6 TABLET | Refills: 0 | Status: SHIPPED | OUTPATIENT
Start: 2019-11-04 | End: 2019-11-09

## 2019-11-04 RX ORDER — BENZONATATE 100 MG/1
100 CAPSULE ORAL 3 TIMES DAILY PRN
Qty: 21 CAPSULE | Refills: 0 | Status: SHIPPED | OUTPATIENT
Start: 2019-11-04 | End: 2019-11-11

## 2019-11-04 RX ORDER — GUAIFENESIN AND DEXTROMETHORPHAN HYDROBROMIDE 100; 10 MG/5ML; MG/5ML
10 SOLUTION ORAL EVERY 4 HOURS PRN
Qty: 236 ML | Refills: 0 | Status: SHIPPED | OUTPATIENT
Start: 2019-11-04 | End: 2020-10-12 | Stop reason: SDUPTHER

## 2019-11-04 ASSESSMENT — LIFESTYLE VARIABLES
SMOKING_YEARS: 15
SMOKING_STATUS: YES

## 2019-11-12 ENCOUNTER — TELEPHONE (OUTPATIENT)
Dept: GASTROENTEROLOGY | Age: 29
End: 2019-11-12

## 2019-11-12 ENCOUNTER — OFFICE VISIT (OUTPATIENT)
Dept: FAMILY MEDICINE CLINIC | Age: 29
End: 2019-11-12
Payer: MEDICARE

## 2019-11-12 VITALS
SYSTOLIC BLOOD PRESSURE: 118 MMHG | WEIGHT: 200 LBS | BODY MASS INDEX: 33.32 KG/M2 | HEIGHT: 65 IN | DIASTOLIC BLOOD PRESSURE: 80 MMHG | OXYGEN SATURATION: 93 % | TEMPERATURE: 96.8 F | HEART RATE: 95 BPM

## 2019-11-12 DIAGNOSIS — R73.01 IFG (IMPAIRED FASTING GLUCOSE): ICD-10-CM

## 2019-11-12 DIAGNOSIS — F17.200 SMOKES AND MOTIVATED TO QUIT: ICD-10-CM

## 2019-11-12 DIAGNOSIS — J01.01 ACUTE RECURRENT MAXILLARY SINUSITIS: Primary | ICD-10-CM

## 2019-11-12 DIAGNOSIS — K21.9 GASTROESOPHAGEAL REFLUX DISEASE WITHOUT ESOPHAGITIS: ICD-10-CM

## 2019-11-12 LAB — HBA1C MFR BLD: 5.4 %

## 2019-11-12 PROCEDURE — G8427 DOCREV CUR MEDS BY ELIG CLIN: HCPCS | Performed by: FAMILY MEDICINE

## 2019-11-12 PROCEDURE — 4004F PT TOBACCO SCREEN RCVD TLK: CPT | Performed by: FAMILY MEDICINE

## 2019-11-12 PROCEDURE — G8482 FLU IMMUNIZE ORDER/ADMIN: HCPCS | Performed by: FAMILY MEDICINE

## 2019-11-12 PROCEDURE — 83036 HEMOGLOBIN GLYCOSYLATED A1C: CPT | Performed by: FAMILY MEDICINE

## 2019-11-12 PROCEDURE — G8417 CALC BMI ABV UP PARAM F/U: HCPCS | Performed by: FAMILY MEDICINE

## 2019-11-12 PROCEDURE — 99213 OFFICE O/P EST LOW 20 MIN: CPT | Performed by: FAMILY MEDICINE

## 2019-11-12 RX ORDER — VARENICLINE TARTRATE 1 MG/1
1 TABLET, FILM COATED ORAL 2 TIMES DAILY
Qty: 60 TABLET | Refills: 3 | Status: SHIPPED | OUTPATIENT
Start: 2019-11-12 | End: 2020-02-10

## 2019-11-12 RX ORDER — AMOXICILLIN AND CLAVULANATE POTASSIUM 875; 125 MG/1; MG/1
1 TABLET, FILM COATED ORAL 2 TIMES DAILY
Qty: 20 TABLET | Refills: 0 | Status: SHIPPED | OUTPATIENT
Start: 2019-11-12 | End: 2019-11-22

## 2019-11-12 RX ORDER — VARENICLINE TARTRATE 25 MG
KIT ORAL
Qty: 53 TABLET | Refills: 0 | Status: SHIPPED | OUTPATIENT
Start: 2019-11-12 | End: 2020-02-10 | Stop reason: DRUGHIGH

## 2019-11-12 RX ORDER — ECHINACEA PURPUREA EXTRACT 125 MG
2 TABLET ORAL PRN
Qty: 1 BOTTLE | Refills: 3 | Status: SHIPPED | OUTPATIENT
Start: 2019-11-12 | End: 2021-05-19 | Stop reason: ALTCHOICE

## 2019-11-12 RX ORDER — FLUTICASONE PROPIONATE 50 MCG
2 SPRAY, SUSPENSION (ML) NASAL EVERY EVENING
Qty: 1 BOTTLE | Refills: 0 | Status: SHIPPED | OUTPATIENT
Start: 2019-11-12 | End: 2021-08-12

## 2019-11-12 ASSESSMENT — ENCOUNTER SYMPTOMS
RHINORRHEA: 1
ABDOMINAL DISTENTION: 0
TROUBLE SWALLOWING: 0
SINUS PAIN: 1
NAUSEA: 1
WHEEZING: 0
SORE THROAT: 0
VOMITING: 1
ABDOMINAL PAIN: 1
SINUS PRESSURE: 1
CONSTIPATION: 0
SHORTNESS OF BREATH: 0
VOICE CHANGE: 1
DIARRHEA: 1
COUGH: 1
CHEST TIGHTNESS: 0

## 2019-12-02 ENCOUNTER — TELEPHONE (OUTPATIENT)
Dept: FAMILY MEDICINE CLINIC | Age: 29
End: 2019-12-02

## 2019-12-02 DIAGNOSIS — J01.80 ACUTE NON-RECURRENT SINUSITIS OF OTHER SINUS: Primary | ICD-10-CM

## 2019-12-02 RX ORDER — AMOXICILLIN AND CLAVULANATE POTASSIUM 875; 125 MG/1; MG/1
1 TABLET, FILM COATED ORAL 2 TIMES DAILY
Qty: 14 TABLET | Refills: 0 | Status: SHIPPED | OUTPATIENT
Start: 2019-12-02 | End: 2019-12-09

## 2019-12-03 ENCOUNTER — NURSE ONLY (OUTPATIENT)
Dept: FAMILY MEDICINE CLINIC | Age: 29
End: 2019-12-03

## 2019-12-03 ENCOUNTER — OFFICE VISIT (OUTPATIENT)
Dept: GASTROENTEROLOGY | Age: 29
End: 2019-12-03
Payer: MEDICARE

## 2019-12-03 VITALS
BODY MASS INDEX: 32.9 KG/M2 | SYSTOLIC BLOOD PRESSURE: 118 MMHG | DIASTOLIC BLOOD PRESSURE: 83 MMHG | WEIGHT: 197.7 LBS | HEART RATE: 95 BPM

## 2019-12-03 DIAGNOSIS — K21.9 GASTROESOPHAGEAL REFLUX DISEASE WITHOUT ESOPHAGITIS: Primary | ICD-10-CM

## 2019-12-03 DIAGNOSIS — Z11.1 PPD SCREENING TEST: Primary | ICD-10-CM

## 2019-12-03 PROCEDURE — 99243 OFF/OP CNSLTJ NEW/EST LOW 30: CPT | Performed by: INTERNAL MEDICINE

## 2019-12-03 PROCEDURE — G8427 DOCREV CUR MEDS BY ELIG CLIN: HCPCS | Performed by: INTERNAL MEDICINE

## 2019-12-03 PROCEDURE — G8482 FLU IMMUNIZE ORDER/ADMIN: HCPCS | Performed by: INTERNAL MEDICINE

## 2019-12-03 PROCEDURE — G8417 CALC BMI ABV UP PARAM F/U: HCPCS | Performed by: INTERNAL MEDICINE

## 2019-12-03 ASSESSMENT — ENCOUNTER SYMPTOMS
SORE THROAT: 0
CHOKING: 0
SINUS PRESSURE: 0
ABDOMINAL PAIN: 1
NAUSEA: 1
COUGH: 0
VOMITING: 1
WHEEZING: 0
DIARRHEA: 1
TROUBLE SWALLOWING: 0
BACK PAIN: 1

## 2019-12-05 ENCOUNTER — NURSE ONLY (OUTPATIENT)
Dept: FAMILY MEDICINE CLINIC | Age: 29
End: 2019-12-05

## 2019-12-05 DIAGNOSIS — Z11.1 PPD SCREENING TEST: Primary | ICD-10-CM

## 2019-12-06 ENCOUNTER — ANESTHESIA EVENT (OUTPATIENT)
Dept: ENDOSCOPY | Age: 29
End: 2019-12-06
Payer: MEDICARE

## 2019-12-06 ENCOUNTER — HOSPITAL ENCOUNTER (OUTPATIENT)
Age: 29
Setting detail: OUTPATIENT SURGERY
Discharge: HOME | End: 2019-12-06
Attending: INTERNAL MEDICINE | Admitting: INTERNAL MEDICINE
Payer: MEDICARE

## 2019-12-06 ENCOUNTER — ANESTHESIA (OUTPATIENT)
Dept: ENDOSCOPY | Age: 29
End: 2019-12-06
Payer: MEDICARE

## 2019-12-06 VITALS — DIASTOLIC BLOOD PRESSURE: 89 MMHG | OXYGEN SATURATION: 96 % | SYSTOLIC BLOOD PRESSURE: 139 MMHG

## 2019-12-06 VITALS
HEART RATE: 66 BPM | OXYGEN SATURATION: 100 % | DIASTOLIC BLOOD PRESSURE: 86 MMHG | TEMPERATURE: 97.5 F | RESPIRATION RATE: 24 BRPM | SYSTOLIC BLOOD PRESSURE: 130 MMHG | BODY MASS INDEX: 31.16 KG/M2 | HEIGHT: 65 IN | WEIGHT: 187 LBS

## 2019-12-06 PROCEDURE — 3609012400 HC EGD TRANSORAL BIOPSY SINGLE/MULTIPLE: Performed by: INTERNAL MEDICINE

## 2019-12-06 PROCEDURE — 7100000000 HC PACU RECOVERY - FIRST 15 MIN: Performed by: INTERNAL MEDICINE

## 2019-12-06 PROCEDURE — 43239 EGD BIOPSY SINGLE/MULTIPLE: CPT | Performed by: INTERNAL MEDICINE

## 2019-12-06 PROCEDURE — 2580000003 HC RX 258: Performed by: ANESTHESIOLOGY

## 2019-12-06 PROCEDURE — 6360000002 HC RX W HCPCS: Performed by: NURSE ANESTHETIST, CERTIFIED REGISTERED

## 2019-12-06 PROCEDURE — 2500000003 HC RX 250 WO HCPCS: Performed by: NURSE ANESTHETIST, CERTIFIED REGISTERED

## 2019-12-06 PROCEDURE — 2709999900 HC NON-CHARGEABLE SUPPLY: Performed by: INTERNAL MEDICINE

## 2019-12-06 PROCEDURE — 88342 IMHCHEM/IMCYTCHM 1ST ANTB: CPT

## 2019-12-06 PROCEDURE — 88305 TISSUE EXAM BY PATHOLOGIST: CPT

## 2019-12-06 PROCEDURE — 3700000001 HC ADD 15 MINUTES (ANESTHESIA): Performed by: INTERNAL MEDICINE

## 2019-12-06 PROCEDURE — 6370000000 HC RX 637 (ALT 250 FOR IP): Performed by: INTERNAL MEDICINE

## 2019-12-06 PROCEDURE — 7100000001 HC PACU RECOVERY - ADDTL 15 MIN: Performed by: INTERNAL MEDICINE

## 2019-12-06 PROCEDURE — 3700000000 HC ANESTHESIA ATTENDED CARE: Performed by: INTERNAL MEDICINE

## 2019-12-06 RX ORDER — PROPOFOL 10 MG/ML
INJECTION, EMULSION INTRAVENOUS PRN
Status: DISCONTINUED | OUTPATIENT
Start: 2019-12-06 | End: 2019-12-06 | Stop reason: SDUPTHER

## 2019-12-06 RX ORDER — LIDOCAINE HYDROCHLORIDE 20 MG/ML
INJECTION, SOLUTION EPIDURAL; INFILTRATION; INTRACAUDAL; PERINEURAL PRN
Status: DISCONTINUED | OUTPATIENT
Start: 2019-12-06 | End: 2019-12-06 | Stop reason: SDUPTHER

## 2019-12-06 RX ORDER — SODIUM CHLORIDE, SODIUM LACTATE, POTASSIUM CHLORIDE, CALCIUM CHLORIDE 600; 310; 30; 20 MG/100ML; MG/100ML; MG/100ML; MG/100ML
INJECTION, SOLUTION INTRAVENOUS CONTINUOUS
Status: DISCONTINUED | OUTPATIENT
Start: 2019-12-06 | End: 2019-12-06 | Stop reason: HOSPADM

## 2019-12-06 RX ADMIN — SODIUM CHLORIDE, POTASSIUM CHLORIDE, SODIUM LACTATE AND CALCIUM CHLORIDE: 600; 310; 30; 20 INJECTION, SOLUTION INTRAVENOUS at 10:32

## 2019-12-06 RX ADMIN — PROPOFOL 100 MG: 10 INJECTION, EMULSION INTRAVENOUS at 11:36

## 2019-12-06 RX ADMIN — PROPOFOL 200 MG: 10 INJECTION, EMULSION INTRAVENOUS at 11:33

## 2019-12-06 RX ADMIN — PROPOFOL 100 MG: 10 INJECTION, EMULSION INTRAVENOUS at 11:39

## 2019-12-06 RX ADMIN — PROPOFOL 100 MG: 10 INJECTION, EMULSION INTRAVENOUS at 11:42

## 2019-12-06 RX ADMIN — LIDOCAINE HYDROCHLORIDE 100 MG: 20 INJECTION, SOLUTION EPIDURAL; INFILTRATION; INTRACAUDAL at 11:33

## 2019-12-06 ASSESSMENT — PULMONARY FUNCTION TESTS
PIF_VALUE: 0
PIF_VALUE: 1
PIF_VALUE: 0
PIF_VALUE: 1
PIF_VALUE: 0
PIF_VALUE: 1
PIF_VALUE: 0

## 2019-12-06 ASSESSMENT — ENCOUNTER SYMPTOMS
STRIDOR: 0
SHORTNESS OF BREATH: 0

## 2019-12-06 ASSESSMENT — LIFESTYLE VARIABLES: SMOKING_STATUS: 0

## 2019-12-06 ASSESSMENT — PAIN SCALES - GENERAL: PAINLEVEL_OUTOF10: 0

## 2019-12-06 ASSESSMENT — PAIN - FUNCTIONAL ASSESSMENT: PAIN_FUNCTIONAL_ASSESSMENT: 0-10

## 2019-12-09 LAB — SURGICAL PATHOLOGY REPORT: NORMAL

## 2020-01-30 RX ORDER — ESCITALOPRAM OXALATE 5 MG/1
5 TABLET ORAL DAILY
Qty: 90 TABLET | Refills: 1 | Status: SHIPPED | OUTPATIENT
Start: 2020-01-30 | End: 2020-07-16

## 2020-02-10 RX ORDER — VARENICLINE TARTRATE 1 MG/1
TABLET, FILM COATED ORAL
Qty: 56 TABLET | Refills: 3 | Status: SHIPPED | OUTPATIENT
Start: 2020-02-10 | End: 2021-05-19

## 2020-02-11 ENCOUNTER — OFFICE VISIT (OUTPATIENT)
Dept: FAMILY MEDICINE CLINIC | Age: 30
End: 2020-02-11
Payer: MEDICARE

## 2020-02-11 VITALS
OXYGEN SATURATION: 98 % | SYSTOLIC BLOOD PRESSURE: 120 MMHG | HEART RATE: 89 BPM | WEIGHT: 196.2 LBS | DIASTOLIC BLOOD PRESSURE: 80 MMHG | HEIGHT: 65 IN | BODY MASS INDEX: 32.69 KG/M2

## 2020-02-11 PROCEDURE — G8482 FLU IMMUNIZE ORDER/ADMIN: HCPCS | Performed by: FAMILY MEDICINE

## 2020-02-11 PROCEDURE — 4004F PT TOBACCO SCREEN RCVD TLK: CPT | Performed by: FAMILY MEDICINE

## 2020-02-11 PROCEDURE — G8417 CALC BMI ABV UP PARAM F/U: HCPCS | Performed by: FAMILY MEDICINE

## 2020-02-11 PROCEDURE — 99213 OFFICE O/P EST LOW 20 MIN: CPT | Performed by: FAMILY MEDICINE

## 2020-02-11 PROCEDURE — G8427 DOCREV CUR MEDS BY ELIG CLIN: HCPCS | Performed by: FAMILY MEDICINE

## 2020-02-11 RX ORDER — AZITHROMYCIN 250 MG/1
250 TABLET, FILM COATED ORAL SEE ADMIN INSTRUCTIONS
Qty: 6 TABLET | Refills: 0 | Status: SHIPPED | OUTPATIENT
Start: 2020-02-11 | End: 2020-02-16

## 2020-02-11 ASSESSMENT — ENCOUNTER SYMPTOMS
COLOR CHANGE: 0
NAUSEA: 0
CHEST TIGHTNESS: 0
SORE THROAT: 1
ABDOMINAL PAIN: 0
EYE PAIN: 0
SINUS PAIN: 0
CONSTIPATION: 0
DIARRHEA: 0
WHEEZING: 0
VOMITING: 0
COUGH: 0

## 2020-02-11 NOTE — PROGRESS NOTES
+doppler flow Tumor markers negative     PTSD (post-traumatic stress disorder)     Seasonal allergic rhinitis 5/24/2019    Seasonal allergic rhinitis     Tobacco abuse     Tobacco abuse     Urinary retention with incomplete bladder emptying     HX    Wears glasses     night vision issues      Current Outpatient Medications   Medication Sig Dispense Refill    azithromycin (ZITHROMAX) 250 MG tablet Take 1 tablet by mouth See Admin Instructions for 5 days 500mg on day 1 followed by 250mg on days 2 - 5 6 tablet 0    CHANTIX CONTINUING MONTH TOMÁS 1 MG tablet USE PER PACKAGE INSTRUCTIONS 56 tablet 3    escitalopram (LEXAPRO) 5 MG tablet TAKE 1 TABLET BY MOUTH DAILY 90 tablet 1    pantoprazole (PROTONIX) 40 MG tablet TAKE 1 TABLET BY MOUTH EVERY MORNING BEFORE BREAKFAST 90 tablet 0    loratadine-pseudoephedrine (CLARITIN-D 12 HOUR) 5-120 MG per extended release tablet Take 1 tablet by mouth 2 times daily as needed (S) 10 tablet 0    fluticasone (FLONASE) 50 MCG/ACT nasal spray 2 sprays by Nasal route every evening 1 Bottle 0    ranitidine (ZANTAC) 150 MG capsule TAKE 1 CAPSULE BY MOUTH EVERY EVENING 90 capsule 0    Dextromethorphan-guaiFENesin  MG/5ML SYRP Take 10 mLs by mouth every 4 hours as needed for Cough 236 mL 0    vitamin B-2 (RIBOFLAVIN) 100 MG TABS tablet TAKE 100 MG BY MOUTH DAILY 90 tablet 2    Elastic Bandages & Supports (WRIST SPLINT/NEOPRENE RIGHT MD) MISC Needs right wrist splint 1 each 0    Cholecalciferol (VITAMIN D) 2000 units TABS tablet TAKE 1 TABLET BY MOUTH DAILY 30 tablet 3    cetirizine (ZYRTEC) 10 MG tablet Take 1 tablet by mouth daily 90 tablet 3    mometasone (NASONEX) 50 MCG/ACT nasal spray INHALE 2 SPRAYS BY NASAL ROUTE DAILY 17 g 3    butalbital-acetaminophen-caffeine (FIORICET, ESGIC) -40 MG per tablet Take 1 tablet by mouth every 6 hours as needed for Headaches or Migraine MAX 3 DAYS/WEEK 30 tablet 0    PROVENTIL  (90 Base) MCG/ACT inhaler Inhale 2 puffs into the lungs every 6 hours as needed for Wheezing or Shortness of Breath 1 Inhaler 0    MAPAP 500 MG tablet TAKE 1 TABLET BY MOUTH EVERY 6 HOURS AS NEEDED FOR PAIN 120 tablet 1    sodium chloride (ALTAMIST SPRAY) 0.65 % nasal spray 2 sprays by Nasal route as needed for Congestion (Q 2-3 HOURS prn) 1 Bottle 3     No current facility-administered medications for this visit. Allergies   Allergen Reactions    Shellfish-Derived Products     Levaquin [Levofloxacin In D5w]      Tendon pain     Subjective:   Review of Systems   Constitutional: Positive for chills and fatigue. Negative for fever. HENT: Positive for ear pain, postnasal drip and sore throat. Negative for congestion and sinus pain. Eyes: Negative for pain. Respiratory: Negative for cough, chest tightness and wheezing. Cardiovascular: Negative for chest pain and palpitations. Gastrointestinal: Negative for abdominal pain, constipation, diarrhea, nausea and vomiting. Genitourinary: Negative for difficulty urinating. Musculoskeletal: Negative for arthralgias. Skin: Negative for color change. Allergic/Immunologic: Positive for environmental allergies. Neurological: Negative for dizziness and headaches. Hematological: Positive for adenopathy. Psychiatric/Behavioral: Negative for dysphoric mood and sleep disturbance. The patient is nervous/anxious. Objective:   Physical Exam  Vitals signs and nursing note reviewed. Constitutional:       Appearance: She is well-developed. She is ill-appearing. HENT:      Right Ear: Ear canal normal. No tenderness. No middle ear effusion. Tympanic membrane has decreased mobility. Left Ear: No mastoid tenderness. Tympanic membrane has decreased mobility. Nose: Congestion and rhinorrhea present. No mucosal edema. Right Sinus: No maxillary sinus tenderness or frontal sinus tenderness. Left Sinus: No maxillary sinus tenderness or frontal sinus tenderness. Z-pack  Rest.  Advised to increase fluid intake. Eat more fruits and vegetables. Take medications as discussed. Report for worsening symptoms. - azithromycin (ZITHROMAX) 250 MG tablet; Take 1 tablet by mouth See Admin Instructions for 5 days 500mg on day 1 followed by 250mg on days 2 - 5  Dispense: 6 tablet; Refill: 0    3. URTI (acute upper respiratory infection)  Ongoing  Rest   Fluids  COntinue zyrtec     Plan:     Natasha received counseling on the following healthy behaviors: nutrition, exercise and medication adherence  Reviewed prior labs and health maintenance  Continue current medications, diet and exercise. Discussed use, benefit, and side effects of prescribed medications. Barriers to medication compliance addressed. Patient given educational materials - see patient instructions  Was a self-tracking handout given in paper form or via [employer]? Yes    Requested Prescriptions     Signed Prescriptions Disp Refills    azithromycin (ZITHROMAX) 250 MG tablet 6 tablet 0     Sig: Take 1 tablet by mouth See Admin Instructions for 5 days 500mg on day 1 followed by 250mg on days 2 - 5       All patient questions answered. Patient voiced understanding. Quality Measures    Body mass index is 32.65 kg/m². Elevated. Weight control planned discussed Healthy diet and regular exercise. BP: 120/80 Blood pressure is normal. Treatment plan consists of No treatment change needed. No results found for: LDLCALC, LDLCHOLESTEROL, LDLDIRECT (goal LDL reduction with dx if diabetes is 50% LDL reduction)      PHQ Scores 9/24/2019 8/20/2019 3/19/2019 3/5/2019 12/18/2018 11/20/2018 10/30/2018   PHQ2 Score 0 1 0 1 0 0 0   PHQ9 Score 0 6 0 6 0 0 0     Interpretation of Total Score Depression Severity: 1-4 = Minimal depression, 5-9 = Mild depression, 10-14 = Moderate depression, 15-19 = Moderately severe depression, 20-27 = Severe depression   Rest.  Advised to increase fluid intake. Eat more fruits and vegetables.   Take medications as discussed. Report for worsening symptoms. Return for Keep Appt w/ Dr. Lawrence Beard. Orders Placed This Encounter   Medications    azithromycin (ZITHROMAX) 250 MG tablet     Sig: Take 1 tablet by mouth See Admin Instructions for 5 days 500mg on day 1 followed by 250mg on days 2 - 5     Dispense:  6 tablet     Refill:  0        No results found for this visit on 02/11/20. Patient given educational materials - see patient instructions. Discussed use, benefit, and side effects of prescribed medications. All patientquestions answered. Pt voiced understanding. This note was transcribed using dictation with Dragon services. Efforts were made to correct any errors but some words may be misinterpreted.    Electronically signed by CONRAD Rapp CNP on 2/11/2020at 11:46 AM

## 2020-02-11 NOTE — PROGRESS NOTES
Visit Information    Have you changed or started any medications since your last visit including any over-the-counter medicines, vitamins, or herbal medicines? no   Are you having any side effects from any of your medications? -  no  Have you stopped taking any of your medications? Is so, why? -  no    Have you seen any other physician or provider since your last visit? No  Have you had any other diagnostic tests since your last visit? No  Have you been seen in the emergency room and/or had an admission to a hospital since we last saw you? Yes - Records Obtained  Have you had your routine dental cleaning in the past 6 months? yes -     Have you activated your [employer] account? If not, what are your barriers?  Yes     Patient Care Team:  Manny Bowen MD as PCP - General  Manny Bowen MD as PCP - [facility]  Nilesh Storm DO as Consulting Physician (Obstetrics & Gynecology)  Mohan Nava MD as Consulting Physician (Urology)  Gregorio Epstein MD as Consulting Physician (Gastroenterology)    Medical History Review  Past Medical, Family, and Social History reviewed and does contribute to the patient presenting condition    Health Maintenance   Topic Date Due    A1C test (Diabetic or Prediabetic)  11/12/2020    DTaP/Tdap/Td vaccine (2 - Td) 03/19/2029    Shingles Vaccine (1 of 2) 10/19/2039    Flu vaccine  Completed    Pneumococcal 0-64 years Vaccine  Completed    HIV screen  Completed    Hepatitis A vaccine  Aged Out    Hepatitis B vaccine  Aged Out    Hib vaccine  Aged Out    Meningococcal (ACWY) vaccine  Aged Out    Varicella vaccine  Discontinued

## 2020-03-30 ENCOUNTER — E-VISIT (OUTPATIENT)
Dept: FAMILY MEDICINE CLINIC | Age: 30
End: 2020-03-30
Payer: MEDICARE

## 2020-03-30 PROCEDURE — 99421 OL DIG E/M SVC 5-10 MIN: CPT | Performed by: FAMILY MEDICINE

## 2020-03-30 NOTE — PROGRESS NOTES
HPI: per patient's questionnaire    EXAM: not applicable    Diagnoses and all orders for this visit:    1. Food poisoning  Resolving   OK to return back to work  Will make letter and ask staff to fax it    Patient was advised to contact PCP if symptoms worsen or failing to change as expected    5-10 minutes were spent on the digital evaluation and management of this patient.

## 2020-04-23 RX ORDER — PANTOPRAZOLE SODIUM 40 MG/1
TABLET, DELAYED RELEASE ORAL
Qty: 90 TABLET | Refills: 0 | Status: SHIPPED | OUTPATIENT
Start: 2020-04-23 | End: 2020-07-16

## 2020-05-04 RX ORDER — CETIRIZINE HYDROCHLORIDE 10 MG/1
10 TABLET ORAL DAILY
Qty: 90 TABLET | Refills: 3 | Status: SHIPPED | OUTPATIENT
Start: 2020-05-04 | End: 2021-02-22

## 2020-05-04 NOTE — TELEPHONE ENCOUNTER
Please Approve or Refuse.   Send to Pharmacy per Pt's Request:      Next Visit Date:  Visit date not found   Last Visit Date: 2/11/2020    Hemoglobin A1C (%)   Date Value   11/12/2019 5.4   10/30/2018 5.4   04/09/2015 5.4             ( goal A1C is < 7)   BP Readings from Last 3 Encounters:   02/11/20 120/80   12/06/19 130/86   12/06/19 139/89          (goal 120/80)  BUN   Date Value Ref Range Status   05/29/2019 13 6 - 20 mg/dL Final     CREATININE   Date Value Ref Range Status   05/29/2019 0.60 0.50 - 0.90 mg/dL Final     Potassium   Date Value Ref Range Status   05/29/2019 4.5 3.7 - 5.3 mmol/L Final

## 2020-05-04 NOTE — TELEPHONE ENCOUNTER
Please Approve or Refuse.   Send to Pharmacy per Pt's Request:     RX: [facility]     Next Visit Date:  Visit date not found   Last Visit Date: 2/11/2020    Hemoglobin A1C (%)   Date Value   11/12/2019 5.4   10/30/2018 5.4   04/09/2015 5.4             ( goal A1C is < 7)   BP Readings from Last 3 Encounters:   02/11/20 120/80   12/06/19 130/86   12/06/19 139/89          (goal 120/80)  BUN   Date Value Ref Range Status   05/29/2019 13 6 - 20 mg/dL Final     CREATININE   Date Value Ref Range Status   05/29/2019 0.60 0.50 - 0.90 mg/dL Final     Potassium   Date Value Ref Range Status   05/29/2019 4.5 3.7 - 5.3 mmol/L Final

## 2020-05-27 RX ORDER — CHOLECALCIFEROL (VITAMIN D3) 50 MCG
2000 TABLET ORAL DAILY
Qty: 30 TABLET | Refills: 3 | Status: SHIPPED | OUTPATIENT
Start: 2020-05-27 | End: 2020-10-07

## 2020-05-27 RX ORDER — TOPIRAMATE 25 MG/1
25 TABLET ORAL NIGHTLY
Qty: 90 TABLET | Refills: 2 | OUTPATIENT
Start: 2020-05-27

## 2020-05-27 RX ORDER — MENTHOL AND CAMPHOR AND METHYL SALICYLATE .16; .031; .1 G/G; G/G; G/G
GEL TOPICAL
Qty: 113.3 G | Refills: 3 | OUTPATIENT
Start: 2020-05-27

## 2020-06-15 ENCOUNTER — TELEPHONE (OUTPATIENT)
Dept: FAMILY MEDICINE CLINIC | Age: 30
End: 2020-06-15

## 2020-07-16 ENCOUNTER — PATIENT MESSAGE (OUTPATIENT)
Dept: FAMILY MEDICINE CLINIC | Age: 30
End: 2020-07-16

## 2020-07-16 RX ORDER — ESCITALOPRAM OXALATE 5 MG/1
TABLET ORAL
Qty: 90 TABLET | Refills: 1 | Status: SHIPPED | OUTPATIENT
Start: 2020-07-16 | End: 2020-12-30

## 2020-07-16 RX ORDER — PANTOPRAZOLE SODIUM 40 MG/1
TABLET, DELAYED RELEASE ORAL
Qty: 90 TABLET | Refills: 0 | Status: SHIPPED | OUTPATIENT
Start: 2020-07-16 | End: 2020-10-07

## 2020-07-16 NOTE — TELEPHONE ENCOUNTER
From: Kelly Rahman Etts  To: Yuki Ospina MD  Sent: 7/16/2020 8:24 AM EDT  Subject: Non-Urgent Medical Question    I would like to have a referral to someone that can help with my hands. My right hand is bothering me throughout the day and night my brace is not helping anymore.

## 2020-07-16 NOTE — TELEPHONE ENCOUNTER
Last seen 3/30/20    Next Visit Date:  Future Appointments   Date Time Provider Cecelia Garner   8/26/2020 11:00 AM Shannan Russell MD fp sc [address] Maintenance   Topic Date Due    Flu vaccine (1) 09/01/2020    A1C test (Diabetic or Prediabetic)  11/12/2020    DTaP/Tdap/Td vaccine (2 - Td) 03/19/2029    Pneumococcal 0-64 years Vaccine  Completed    HIV screen  Completed    Hepatitis A vaccine  Aged Out    Hepatitis B vaccine  Aged Out    Hib vaccine  Aged Out    Meningococcal (ACWY) vaccine  Aged Out    Varicella vaccine  Discontinued       Hemoglobin A1C (%)   Date Value   11/12/2019 5.4   10/30/2018 5.4   04/09/2015 5.4             ( goal A1C is < 7)   No results found for: LABMICR  No results found for: LDLCHOLESTEROL, LDLCALC    (goal LDL is <100)   AST (U/L)   Date Value   05/29/2019 22     ALT (U/L)   Date Value   05/29/2019 27     BUN (mg/dL)   Date Value   05/29/2019 13     BP Readings from Last 3 Encounters:   02/11/20 120/80   12/06/19 130/86   12/06/19 139/89          (goal 120/80)    All Future Testing planned in [facility]  Lab Frequency Next Occurrence   US Liver Once 12/16/2020               Patient Active Problem List:     Smoker     S/P GONZALO, RSO, appendectomy 7/7/14     Bipolar II disorder ([facility] [address]     Obesity (BMI 30.0-34. 9)     Cyst of left kidney     Insomnia     IFG (impaired fasting glucose)     Fatigue     History of total abdominal hysterectomy     Chronic bilateral low back pain without sciatica     Chronic tension-type headache, not intractable     Smokes and motivated to quit     Incomplete RBBB     GABBY (generalized anxiety disorder)     Gastroesophageal reflux disease without esophagitis     Seasonal allergic rhinitis

## 2020-08-25 NOTE — PROGRESS NOTES
Visit Information    Have you changed or started any medications since your last visit including any over-the-counter medicines, vitamins, or herbal medicines? no   Are you having any side effects from any of your medications? -  no  Have you stopped taking any of your medications? Is so, why? -  no    Have you seen any other physician or provider since your last visit? No  Have you had any other diagnostic tests since your last visit? No  Have you been seen in the emergency room and/or had an admission to a hospital since we last saw you? No  Have you had your routine dental cleaning in the past 6 months? no    Have you activated your [employer] account? If not, what are your barriers?  Yes     Patient Care Team:  Haris Perez MD as PCP - Rosa Nguyen MD as PCP - [facility] Provider  Sergio Negrete DO as Consulting Physician (Obstetrics & Gynecology)  Ines Fong MD as Consulting Physician (Urology)  Artur Driscoll MD as Consulting Physician (Gastroenterology)    Medical History Review  Past Medical, Family, and Social History reviewed and does contribute to the patient presenting condition    Health Maintenance   Topic Date Due    Flu vaccine (1) 09/01/2020    A1C test (Diabetic or Prediabetic)  11/12/2020    DTaP/Tdap/Td vaccine (2 - Td) 03/19/2029    Pneumococcal 0-64 years Vaccine  Completed    HIV screen  Completed    Hepatitis A vaccine  Aged Out    Hepatitis B vaccine  Aged Out    Hib vaccine  Aged Out    Meningococcal (ACWY) vaccine  Aged Out    Varicella vaccine  Discontinued

## 2020-08-26 ENCOUNTER — TELEMEDICINE (OUTPATIENT)
Dept: FAMILY MEDICINE CLINIC | Age: 30
End: 2020-08-26
Payer: MEDICARE

## 2020-08-26 PROCEDURE — G8427 DOCREV CUR MEDS BY ELIG CLIN: HCPCS | Performed by: FAMILY MEDICINE

## 2020-08-26 PROCEDURE — 99213 OFFICE O/P EST LOW 20 MIN: CPT | Performed by: FAMILY MEDICINE

## 2020-08-26 RX ORDER — PHENTERMINE HYDROCHLORIDE 37.5 MG/1
37.5 TABLET ORAL
Qty: 30 TABLET | Refills: 0 | Status: SHIPPED | OUTPATIENT
Start: 2020-08-26 | End: 2020-09-04 | Stop reason: SDUPTHER

## 2020-08-26 ASSESSMENT — ENCOUNTER SYMPTOMS
CONSTIPATION: 0
NAUSEA: 0
COUGH: 0
CHEST TIGHTNESS: 0
VOMITING: 0
WHEEZING: 0
ABDOMINAL DISTENTION: 0
ABDOMINAL PAIN: 0
SHORTNESS OF BREATH: 0
DIARRHEA: 0

## 2020-08-26 ASSESSMENT — PATIENT HEALTH QUESTIONNAIRE - PHQ9
SUM OF ALL RESPONSES TO PHQ QUESTIONS 1-9: 0
SUM OF ALL RESPONSES TO PHQ QUESTIONS 1-9: 0
1. LITTLE INTEREST OR PLEASURE IN DOING THINGS: 0
2. FEELING DOWN, DEPRESSED OR HOPELESS: 0
SUM OF ALL RESPONSES TO PHQ9 QUESTIONS 1 & 2: 0

## 2020-08-26 NOTE — PROGRESS NOTES
2020    TELEHEALTH EVALUATION -- Audio/Visual (During INAZT-26 public health emergency)    HPI:    Alex Haywood (:  1990) has requested an audio/video evaluation for the following concern(s):Weight Management        Wants to lose weight. Natasha Haywood has made a substantial good daniel effort to lose weight in a regimen for weight reduction based on caloric restriction, nutritional changes, and exercise  Natasha Haywood reports she did make lifestyle changes, low-carb diet and exercise    Contraindications to controlled substance anorexiant: NONE      Natasha Haywood reports no  use of illegal drug substances  Natasha Haywood reports alcohol use of :none    OARRS done today and okay  Plan: diet, exercise and start Adipex      Patient informed that when prescribed a controlled substance for weight loss, the provider is required by law to see the patient for an appointment every thirty days. This is neccessary to record the weight and blood pressure and to assess patient's efforts to lose weight, and to ensure there are no contraindications or adverse effects. blood pressure is Normal.  Blood pressure 134/79  BP Readings from Last 3 Encounters:   20 120/80   19 130/86   19 139/89        Pulse is Normal.  84 bpm  Pulse Readings from Last 3 Encounters:   20 89   19 66   19 95     192 pounds, she intentionally lost 4 pounds in 6 months    Wt Readings from Last 3 Encounters:   20 196 lb 3.2 oz (89 kg)   19 187 lb (84.8 kg)   19 197 lb 11.2 oz (89.7 kg)       Labs BMI 32.65 kg/M2. 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 She is having a part-time job. She says there is a lot of stress in her family at this time and her  mom being sick    Denies suicidal ideation, plan or intent.       PHQ-2 Over the past 2 weeks, how often have you been bothered by any of the loratadine-pseudoephedrine (CLARITIN-D 12 HOUR) 5-120 MG per extended release tablet Take 1 tablet by mouth 2 times daily as needed (S) Yes Pratima Ravi MD   fluticasone (FLONASE) 50 MCG/ACT nasal spray 2 sprays by Nasal route every evening Yes Pratima Ravi MD   ranitidine (ZANTAC) 150 MG capsule TAKE 1 CAPSULE BY MOUTH EVERY EVENING Yes Josue Rosas MD   Dextromethorphan-guaiFENesin  MG/5ML SYRP Take 10 mLs by mouth every 4 hours as needed for Cough Yes Pratima Ravi MD   Elastic Bandages & Supports (WRIST SPLINT/NEOPRENE RIGHT MD) MISC Needs right wrist splint Yes Pratima Ravi MD   mometasone (NASONEX) 50 MCG/ACT nasal spray INHALE 2 SPRAYS BY NASAL ROUTE DAILY Yes Pratima Ravi MD   butalbital-acetaminophen-caffeine (FIORICET, ESGIC) -40 MG per tablet Take 1 tablet by mouth every 6 hours as needed for Headaches or Migraine MAX 3 DAYS/WEEK Yes Ruby Dowell MD   PROVENTIL  (90 Base) MCG/ACT inhaler Inhale 2 puffs into the lungs every 6 hours as needed for Wheezing or Shortness of Breath Yes Pratima Ravi MD   MAPAP 500 MG tablet TAKE 1 TABLET BY MOUTH EVERY 6 HOURS AS NEEDED FOR PAIN Yes Pratima Ravi MD   sodium chloride (ALTAMIST SPRAY) 0.65 % nasal spray 2 sprays by Nasal route as needed for Congestion (Q 2-3 HOURS prn)  Pratima Ravi MD       Social History     Tobacco Use    Smoking status: Current Every Day Smoker     Packs/day: 0.50     Years: 9.00     Pack years: 4.50     Types: Cigarettes    Smokeless tobacco: Never Used   Substance Use Topics    Alcohol use:  Yes     Alcohol/week: 0.0 standard drinks     Comment: occasionally    Drug use: No          PHYSICAL EXAMINATION:    Vital Signs: (As obtained by patient/caregiver or practitioner observation)  -vital signs stable and within normal limits except obesity per BMI, BMI 32.65 kg/M2  Patient-Reported Vitals 8/26/2020   Patient-Reported Weight 192   Patient-Reported Height 5'5   Patient-Reported Systolic 401   Patient-Reported Diastolic 79   Patient-Reported Pulse 84   Patient-Reported Temperature 98.9   Patient-Reported SpO2 98           Constitutional: [x] Appears well-developed and well-nourished [x] No apparent distress      [x] Abnormal-obese    Mental status  [x] Alert and awake  [x] Oriented to person/place/time [x]Able to follow commands      Eyes:  EOM    [x]  Normal  [] Abnormal-  Sclera  [x]  Normal  [] Abnormal -         Discharge [x]  None visible  [] Abnormal -    HENT:   [x] Normocephalic, atraumatic. [] Abnormal   [x] Mouth/Throat: Mucous membranes are moist.     External Ears [x] Normal  [] Abnormal-     Neck: [x] No visualized mass     Pulmonary/Chest: [x] Respiratory effort normal.  [x] No visualized signs of difficulty breathing or respiratory distress        [] Abnormal        Musculoskeletal:   [x] Normal gait with no signs of ataxia         [x] Normal range of motion of neck        [] Abnormal-       Neurological:        [x] No Facial Asymmetry (Cranial nerve 7 motor function) (limited exam to video visit)          [x] No gaze palsy        [] Abnormal-            Skin:        [x] No significant exanthematous lesions or discoloration noted on facial skin         [] Abnormal-            Psychiatric:      [x] No Hallucinations       [x]Mood is normal      [x]Behavior is normal      [x]Judgment is normal      [x]Thought content is normal       [] Abnormal-     Other pertinent observable physical exam findings- none    Due to this being a TeleHealth encounter, evaluation of the following organ systems is limited: Vitals/Constitutional/EENT/Resp/CV/GI//MS/Neuro/Skin/Heme-Lymph-Imm. ASSESSMENT/PLAN:    1. Obesity (BMI 30.0-34. 9)  Improved  - phentermine (ADIPEX-P) 37.5 MG tablet; Take 1 tablet by mouth every morning (before breakfast) for 30 days. Dispense: 30 tablet; Refill: 0  - CBC; Future  - Comprehensive Metabolic Panel; Future  - Lipid Panel;  Future  - TSH without Reflex; Future  Patient was asked about her current diet and exercise habits, and personalized advice was provided regarding recommended lifestyle changes. Patient's comorbid health conditions associated with elevated BMI were discussed, including mood disorder, as well as the likely benefits of weight loss. Based upon patient's motivation to change her behavior, the following plan was agreed upon to work toward a weight loss goal of 1-2 pounds/week: low carbohydrate diet, wear a pedometer and get at least 10,000 steps per day and medication prescribed: Adipex. Educational materials for  weight loss were provided. Patient will follow-up in 1 month(s) with PCP. Provider spent  10 minutes counseling patient. 2. Bipolar II disorder (HCC)  Stable  - TSH without Reflex; Future  We will continue to monitor      Controlled Substance Monitoring:    Acute and Chronic Pain Monitoring:   RX Monitoring 8/26/2020   Attestation -   Periodic Controlled Substance Monitoring Possible medication side effects, risk of tolerance/dependence & alternative treatments discussed. ;No signs of potential drug abuse or diversion identified. ;Assessed functional status. Chronic Pain > 50 MEDD -           Natasha received counseling on the following healthy behaviors: nutrition, exercise, medication adherence and weight loss and smoking cessation  Reviewed prior labs and health maintenance. Continue current medications, diet and exercise. Discussed use, benefit, and side effects of prescribed medications. Barriers to medication compliance addressed. Patient given educational materials - see patient instructions. All patient questions answered. Patient voiced understanding. Return in about 4 weeks (around 9/23/2020) for ADIPEX#2, **Do EXERCISE FLOWSHEET in EPIC.   Adipex #3 in 8 weeks      Future Appointments   Date Time Provider Cecelia Garner   9/24/2020  3:00 PM Amada Greenberg MD [facility] 10/28/2020  1:30 PM Pratima Ravi MD [facility]        Total time spent during this visit 15 minutes including face-to-face, counseling, charting review, and non-face-to-face time. Onur Al is a 26 y.o. female being evaluated by a Virtual Visit (video visit) encounter to address concerns as mentioned above. Due to this being a TeleHealth encounter (During UNHTW-08 public health emergency), evaluation of the following organ systems was limited: Vitals/Constitutional/EENT/Resp/CV/GI//MS/Neuro/Skin/Heme-Lymph-Imm. Pursuant to the emergency declaration under the [address] [place] [address] authority and the [employer] Act, this Virtual Visit was conducted with patient's (and/or legal guardian's) consent, to reduce the patient's risk of exposure to COVID-19 and provide necessary medical care. The patient (and/or legal guardian) has also been advised to contact this office for worsening conditions or problems, and seek emergency medical treatment and/or call 911 if deemed necessary. Services were provided through a video synchronous discussion virtually to substitute for in-person clinic visit. Patient was located at his home, provider was located in the office, at the primary practice location. Patient identification was verified at the start of the visit: Yes    Total time spent for this encounter: 15 minutes    --Pratima Ravi MD on 8/26/2020 at 10:49 PM    An electronic signature was used to authenticate this note.

## 2020-08-26 NOTE — PATIENT INSTRUCTIONS
Patient Education        Learning About Low-Carbohydrate Diets  What is a low-carbohydrate diet? A low-carbohydrate (or \"low-carb\") diet limits foods and drinks that have carbohydrates. This includes grains, fruits, milk and yogurt, and starchy vegetables like potatoes, beans, and corn. It also avoids foods and drinks that have added sugar. Instead, low-carb diets include foods that are high in protein and fat. Why might you follow a low-carb diet? Low-carb diets may be used for a variety of reasons, such as for weight loss. People who have diabetes may use a low-carb diet to help manage their blood sugar levels. What should you do before you start the diet? Talk to your doctor before you try any diet. This is even more important if you have health problems like kidney disease, heart disease, or diabetes. Your doctor may suggest that you meet with a registered dietitian. A dietitian can help you make an eating plan that works for you. What foods do you eat on a low-carb diet? On a low-carb diet, you choose foods that are high in protein and fat. Examples of these are:  · Meat, poultry, and fish. · Eggs. · Nuts, such as walnuts, pecans, almonds, and peanuts. · Peanut butter and other nut butters. · Tofu. · Avocado. · Carnella Putt. · Non-starchy vegetables like broccoli, cauliflower, green beans, mushrooms, peppers, lettuce, and spinach. · Unsweetened non-dairy milks like almond milk and coconut milk. · Cheese, cottage cheese, and cream cheese. Current as of: August 22, 2019               Content Version: 12.5  © 0096-1139 Healthwise, Incorporated. Care instructions adapted under license by [facility] ([facility]). If you have questions about a medical condition or this instruction, always ask your healthcare professional. [address] any warranty or liability for your use of this information.

## 2020-09-04 RX ORDER — PHENTERMINE HYDROCHLORIDE 37.5 MG/1
37.5 TABLET ORAL
Qty: 30 TABLET | Refills: 0 | Status: SHIPPED | OUTPATIENT
Start: 2020-09-04 | End: 2020-09-24 | Stop reason: SDUPTHER

## 2020-09-04 NOTE — TELEPHONE ENCOUNTER
Pt  accidentally threw away medication while throwing out mother in [address] [place] [address] old medications, pt would like refill to replace. If you need to call pt she will speak to you. Last seen 8/26/20     Next Visit Date:  Future Appointments   Date Time Provider Cecelia Garner   9/24/2020  3:00 PM Tracey Wolff MD fp sc [facility]   10/28/2020  1:30 PM Tracey Wolff MD fp sc [address] Maintenance   Topic Date Due    Flu vaccine (1) 09/01/2020    A1C test (Diabetic or Prediabetic)  11/12/2020    DTaP/Tdap/Td vaccine (2 - Td) 03/19/2029    Pneumococcal 0-64 years Vaccine  Completed    HIV screen  Completed    Hepatitis A vaccine  Aged Out    Hepatitis B vaccine  Aged Out    Hib vaccine  Aged Out    Meningococcal (ACWY) vaccine  Aged Out    Varicella vaccine  Discontinued       Hemoglobin A1C (%)   Date Value   11/12/2019 5.4   10/30/2018 5.4   04/09/2015 5.4             ( goal A1C is < 7)   No results found for: LABMICR  No results found for: LDLCHOLESTEROL, LDLCALC    (goal LDL is <100)   AST (U/L)   Date Value   05/29/2019 22     ALT (U/L)   Date Value   05/29/2019 27     BUN (mg/dL)   Date Value   05/29/2019 13     BP Readings from Last 3 Encounters:   02/11/20 120/80   12/06/19 130/86   12/06/19 139/89          (goal 120/80)    All Future Testing planned in [facility]  Lab Frequency Next Occurrence   US Liver Once 12/16/2020   CBC Once 08/28/2020   Comprehensive Metabolic Panel Once 12/95/2467   Lipid Panel Once 08/28/2020   TSH without Reflex Once 08/28/2020               Patient Active Problem List:     Smoker     S/P GONZALO, RSO, appendectomy 7/7/14     Bipolar II disorder ([facility] [address]     Obesity (BMI 30.0-34. 9)     Cyst of left kidney     Insomnia     IFG (impaired fasting glucose)     Fatigue     History of total abdominal hysterectomy     Chronic bilateral low back pain without sciatica     Chronic tension-type headache, not intractable     Smokes and motivated to quit     Incomplete RBBB     GABBY (generalized anxiety disorder)     Gastroesophageal reflux disease without esophagitis     Seasonal allergic rhinitis

## 2020-09-24 ENCOUNTER — VIRTUAL VISIT (OUTPATIENT)
Dept: FAMILY MEDICINE CLINIC | Age: 30
End: 2020-09-24
Payer: MEDICARE

## 2020-09-24 PROCEDURE — 99442 PR PHYS/QHP TELEPHONE EVALUATION 11-20 MIN: CPT | Performed by: FAMILY MEDICINE

## 2020-09-24 RX ORDER — PHENTERMINE HYDROCHLORIDE 37.5 MG/1
37.5 TABLET ORAL
Qty: 30 TABLET | Refills: 0 | Status: SHIPPED | OUTPATIENT
Start: 2020-09-24 | End: 2020-10-24

## 2020-09-24 ASSESSMENT — PATIENT HEALTH QUESTIONNAIRE - PHQ9
SUM OF ALL RESPONSES TO PHQ QUESTIONS 1-9: 0
1. LITTLE INTEREST OR PLEASURE IN DOING THINGS: 0
SUM OF ALL RESPONSES TO PHQ QUESTIONS 1-9: 0
2. FEELING DOWN, DEPRESSED OR HOPELESS: 0
SUM OF ALL RESPONSES TO PHQ9 QUESTIONS 1 & 2: 0

## 2020-09-24 NOTE — PATIENT INSTRUCTIONS
Patient Education        Stopping Smoking: Care Instructions  Your Care Instructions     Cigarette smokers crave the nicotine in cigarettes. Giving it up is much harder than simply changing a habit. Your body has to stop craving the nicotine. It is hard to quit, but you can do it. There are many tools that people use to quit smoking. You may find that combining tools works best for you. There are several steps to quitting. First you get ready to quit. Then you get support to help you. After that, you learn new skills and behaviors to become a nonsmoker. For many people, a necessary step is getting and using medicine. Your doctor will help you set up the plan that best meets your needs. You may want to attend a smoking cessation program to help you quit smoking. When you choose a program, look for one that has proven success. Ask your doctor for ideas. You will greatly increase your chances of success if you take medicine as well as get counseling or join a cessation program.  Some of the changes you feel when you first quit tobacco are uncomfortable. Your body will miss the nicotine at first, and you may feel short-tempered and grumpy. You may have trouble sleeping or concentrating. Medicine can help you deal with these symptoms. You may struggle with changing your smoking habits and rituals. The last step is the tricky one: Be prepared for the smoking urge to continue for a time. This is a lot to deal with, but keep at it. You will feel better. Follow-up care is a key part of your treatment and safety. Be sure to make and go to all appointments, and call your doctor if you are having problems. It's also a good idea to know your test results and keep a list of the medicines you take. How can you care for yourself at home? · Ask your family, friends, and coworkers for support. You have a better chance of quitting if you have help and support.   · Join a support group, such as Nicotine Anonymous, for people who are trying to quit smoking. · Consider signing up for a smoking cessation program, such as the American Lung Association's Freedom from Smoking program.  · Get text messaging support. Go to the website at www.smokefree. gov to sign up for the [facility] program.  · Set a quit date. Pick your date carefully so that it is not right in the middle of a big deadline or stressful time. Once you quit, do not even take a puff. Get rid of all ashtrays and lighters after your last cigarette. Clean your house and your clothes so that they do not smell of smoke. · Learn how to be a nonsmoker. Think about ways you can avoid those things that make you reach for a cigarette. ? Avoid situations that put you at greatest risk for smoking. For some people, it is hard to have a drink with friends without smoking. For others, they might skip a coffee break with coworkers who smoke. ? Change your daily routine. Take a different route to work or eat a meal in a different place. · Cut down on stress. Calm yourself or release tension by doing an activity you enjoy, such as reading a book, taking a hot bath, or gardening. · Talk to your doctor or pharmacist about nicotine replacement therapy, which replaces the nicotine in your body. You still get nicotine but you do not use tobacco. Nicotine replacement products help you slowly reduce the amount of nicotine you need. These products come in several forms, many of them available over-the-counter:  ? Nicotine patches  ? Nicotine gum and lozenges  ? Nicotine inhaler  · Ask your doctor about bupropion (Wellbutrin) or varenicline (Chantix), which are prescription medicines. They do not contain nicotine. They help you by reducing withdrawal symptoms, such as stress and anxiety. · Some people find hypnosis, acupuncture, and massage helpful for ending the smoking habit. · Eat a healthy diet and get regular exercise.  Having healthy habits will help your body move past its craving for nicotine. · Be prepared to keep trying. Most people are not successful the first few times they try to quit. Do not get mad at yourself if you smoke again. Make a list of things you learned and think about when you want to try again, such as next week, next month, or next year. Where can you learn more? Go to https://chpepiceweb.[employer]. org and sign in to your [employer] account. Enter U712 in the [employer] box to learn more about \"Stopping Smoking: Care Instructions. \"     If you do not have an account, please click on the \"Sign Up Now\" link. Current as of: March 12, 2020               Content Version: 12.5  © 2006-2020 Healthwise, [employer]. Care instructions adapted under license by [facility] ([facility]). If you have questions about a medical condition or this instruction, always ask your healthcare professional. [address] any warranty or liability for your use of this information. Patient Education        Learning About Low-Carbohydrate Diets  What is a low-carbohydrate diet? A low-carbohydrate (or \"low-carb\") diet limits foods and drinks that have carbohydrates. This includes grains, fruits, milk and yogurt, and starchy vegetables like potatoes, beans, and corn. It also avoids foods and drinks that have added sugar. Instead, low-carb diets include foods that are high in protein and fat. Why might you follow a low-carb diet? Low-carb diets may be used for a variety of reasons, such as for weight loss. People who have diabetes may use a low-carb diet to help manage their blood sugar levels. What should you do before you start the diet? Talk to your doctor before you try any diet. This is even more important if you have health problems like kidney disease, heart disease, or diabetes. Your doctor may suggest that you meet with a registered dietitian. A dietitian can help you make an eating plan that works for you.   What foods do you eat on a low-carb diet?  On a low-carb diet, you choose foods that are high in protein and fat. Examples of these are:  · Meat, poultry, and fish. · Eggs. · Nuts, such as walnuts, pecans, almonds, and peanuts. · Peanut butter and other nut butters. · Tofu. · Avocado. · Loann Favors. · Non-starchy vegetables like broccoli, cauliflower, green beans, mushrooms, peppers, lettuce, and spinach. · Unsweetened non-dairy milks like almond milk and coconut milk. · Cheese, cottage cheese, and cream cheese. Current as of: August 22, 2019               Content Version: 12.5  © 3491-4242 Healthwise, Incorporated. Care instructions adapted under license by [facility] ([facility]). If you have questions about a medical condition or this instruction, always ask your healthcare professional. [place] [address] any warranty or liability for your use of this information.

## 2020-09-24 NOTE — PROGRESS NOTES
Patient requested office visit, was scheduled for virtual visit phone call , Balbir French was unable to use doxy. me or MyChart. Patient was late from work and she left via [address], she wanted telephone encounter, as she couldn't contact [facility] [address] Mychart,  as she was driving back home. Balbir French is a 26 y.o. female evaluated via telephone on  9/24/20    Consent:  She is aware that that she may receive a bill for this telephone service, depending on her insurance coverage, and has provided verbal consent to proceed: Yes      Documentation and HPI:  I communicated with the patient about: Weight Management       Faye Cassette reports:   Wants to lose weight. Natasha Haywood was started on Adipex. 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 Patient denies anorexia, nausea, vomiting, abdominal pain, involuntary weight loss, palpitations, insomnia, irritability, anxiety, headache and tremor. Patient informed that when prescribed a controlled substance for weight loss, the provider is required by law to see the patient for an appointment every thirty days. This is neccessary to record the weight and blood pressure and to assess patient's efforts to lose weight, and to ensure there are no contraindications or adverse effects. blood pressure is Normal.  Patient says her recent blood pressure was around 130/70 mmHg  BP Readings from Last 3 Encounters:   02/11/20 120/80   12/06/19 130/86   12/06/19 139/89        Weight has been decreasing.   Patient intentionally lost 9 pounds in 1 month  Wt Readings from Last 3 Encounters:   02/11/20 196 lb 3.2 oz (89 kg)   12/06/19 187 lb (84.8 kg)   12/03/19 197 lb 11.2 oz (89.7 kg)     Patient's weight was 192 pounds on 8/26/2020  Patient reports today the weight of 183 pounds  Exercise Tracking - Detailed 9/24/2020   Walking Duration -   Walking Intensity -   Biking Duration -   Biking Intensity -   Running Duration -   Swimming Duration -   Swimming Intensity -   Cardiovascular Equipment Duration -   Cardiovascular Equipment Intensity -   Cardiovascular Equipment Times/Week -   Exercise Classes / Videos Duration -   Exercise Classes / Videos Intensity -   Exercise Classes / Videos Times/Week -   Strength Training Duration -   Strength Training Intensity -   Pedometer Steps/Day 88614   Total Exercise Minutes/Week -     Patient has bipolar disorder type II  Patient says she has been under a lot of stress as her mother-in-law has cancer  Her  has been very supportive  PHQ-2 Over the past 2 weeks, how often have you been bothered by any of the following problems? Little interest or pleasure in doing things: Not at all  Feeling down, depressed, or hopeless: Not at all  PHQ-2 Score: 0  PHQ-9 Over the past 2 weeks, how often have you been bothered by any of the following problems? PHQ-9 Total Score: 0      Denies suicidal ideation, plan or intent. PHQ Scores 9/24/2020 8/26/2020 9/24/2019 8/20/2019 3/19/2019 3/5/2019 12/18/2018   PHQ2 Score 0 0 0 1 0 1 0   PHQ9 Score 0 0 0 6 0 6 0         The patient's past medical, surgical, social, and family history as well as her current medications and allergies were reviewed as documented in today's encounter. Prior to Visit Medications    Medication Sig Taking? Authorizing Provider   phentermine (ADIPEX-P) 37.5 MG tablet Take 1 tablet by mouth every morning (before breakfast) for 30 days.  Yes Tracey Wolff MD   pantoprazole (PROTONIX) 40 MG tablet TAKE 1 TABLET BY MOUTH EVERY MORNING BEFORE BREAKFAST Yes Tracey Wolff MD   escitalopram (LEXAPRO) 5 MG tablet TAKE 1 TABLET BY MOUTH ONCE A DAY Yes Tracey Wolff MD   Masks (CLEVER CHOICE FACE MASK) [facility] USE NEW FACE MASK EVERYDAY WHEN PATIENT GO OUTSIDE OF HOME DUE TO COVID PANDEMIC Yes Doris Todd MD   Cholecalciferol (VITAMIN D) 50 MCG (2000 UT) TABS tablet TAKE 1 TABLET BY MOUTH DAILY Yes Doris Todd MD   cetirizine (ZYRTEC) 10 MG tablet TAKE 1 TABLET BY MOUTH DAILY Yes Doris Todd MD   vitamin B-2 (RIBOFLAVIN) 100 MG TABS tablet TAKE ONE TABLET BY MOUTH ONCE A DAY Yes Doris Todd MD   CHANTIX CONTINUING MONTH TOMÁS 1 MG tablet USE PER PACKAGE INSTRUCTIONS Yes Doris Todd MD   loratadine-pseudoephedrine (CLARITIN-D 12 HOUR) 5-120 MG per extended release tablet Take 1 tablet by mouth 2 times daily as needed (S) Yes Doris Todd MD   fluticasone (FLONASE) 50 MCG/ACT nasal spray 2 sprays by Nasal route every evening Yes Doris Todd MD   ranitidine (ZANTAC) 150 MG capsule TAKE 1 CAPSULE BY MOUTH EVERY EVENING Yes Rosalio Fragoso MD   Dextromethorphan-guaiFENesin  MG/5ML SYRP Take 10 mLs by mouth every 4 hours as needed for Cough Yes Doris Todd MD   Elastic Bandages & Supports (WRIST SPLINT/NEOPRENE RIGHT MD) MISC Needs right wrist splint Yes Doris Todd MD   mometasone (NASONEX) 50 MCG/ACT nasal spray INHALE 2 SPRAYS BY NASAL ROUTE DAILY Yes Doris Todd MD   butalbital-acetaminophen-caffeine (FIORICET, ESGIC) -40 MG per tablet Take 1 tablet by mouth every 6 hours as needed for Headaches or Migraine MAX 3 DAYS/WEEK Yes Ruby Dowell MD   PROVENTIL  (90 Base) MCG/ACT inhaler Inhale 2 puffs into the lungs every 6 hours as needed for Wheezing or Shortness of Breath Yes Doris Todd MD   MAPAP 500 MG tablet TAKE 1 TABLET BY MOUTH EVERY 6 HOURS AS NEEDED FOR PAIN Yes Doris Todd MD   sodium chloride (ALTAMIST SPRAY) 0.65 % nasal spray 2 sprays by Nasal route as needed for Congestion (Q 2-3 HOURS prn)  Doris Todd MD       -vital signs stable and within normal limits except obesity per BMI improving  Prior BMI 32.65 kg/M2  Patient-Reported Vitals 9/24/2020   Patient-Reported Weight 183 lbs   Patient-Reported Height 5 ft 5 in   Patient-Reported Systolic -   Patient-Reported Diastolic -   Patient-Reported Pulse -   Patient-Reported Temperature -   Patient-Reported SpO2 -             Details of this discussion including any medical advice provided:   1. Obesity (BMI 30.0-34. 9)  Improving    - phentermine (ADIPEX-P) 37.5 MG tablet; Take 1 tablet by mouth every morning (before breakfast) for 30 days. Dispense: 30 tablet; Refill: 0  Patient was asked about her current diet and exercise habits, and personalized advice was provided regarding recommended lifestyle changes. Patient's comorbid health conditions associated with elevated BMI were discussed, including hyperglycemia and mood disorder, as well as the likely benefits of weight loss. Based upon patient's motivation to change her behavior, the following plan was agreed upon to work toward a weight loss goal of 1-2 pounds per week: low carbohydrate diet, wear a pedometer and get at least 10,000 steps per day and medication prescribed: Adipex. Educational materials for  weight loss were provided. Patient will follow-up in 1 month(s) with PCP. Provider spent  10 minutes counseling patient. 2. Bipolar II disorder ([facility] [address]  Stable  We will continue to monitor        Controlled Substance Monitoring:    Acute and Chronic Pain Monitoring:   RX Monitoring 8/26/2020   Attestation -   Periodic Controlled Substance Monitoring Possible medication side effects, risk of tolerance/dependence & alternative treatments discussed. ;No signs of potential drug abuse or diversion identified. ;Assessed functional status. Chronic Pain > 50 MEDD -         Natasha was advised to call back if symptoms persist or fail to improve or make appointment .       I affirm this is a Patient Initiated Episode with an Established Patient who has not had a related appointment within my department in the past 7 days or scheduled within the next 24 hours. Patient location's was at home, and provider's location was at the primary practice location. Return in about 4 weeks (around 10/22/2020) for KEEP APPT.   Data Unavailable    Future Appointments   Date Time Provider Cecelia Garner   10/28/2020  1:30 PM Tracey Wolff MD [id number]        Patient identification was verified at the start of the visit: Yes    Total Time spent: minutes: 11-20 minutes    Electronically signed by Tracey Wolff MD on 9/24/2020  at 9:53 PM

## 2020-10-05 ENCOUNTER — E-VISIT (OUTPATIENT)
Dept: FAMILY MEDICINE CLINIC | Age: 30
End: 2020-10-05
Payer: MEDICARE

## 2020-10-05 PROCEDURE — 99421 OL DIG E/M SVC 5-10 MIN: CPT | Performed by: FAMILY MEDICINE

## 2020-10-05 RX ORDER — NITROFURANTOIN 25; 75 MG/1; MG/1
100 CAPSULE ORAL 2 TIMES DAILY
Qty: 20 CAPSULE | Refills: 0 | Status: SHIPPED | OUTPATIENT
Start: 2020-10-05 | End: 2020-10-15

## 2020-10-05 RX ORDER — PHENAZOPYRIDINE HYDROCHLORIDE 100 MG/1
100 TABLET, FILM COATED ORAL 3 TIMES DAILY PRN
Qty: 6 TABLET | Refills: 0 | Status: SHIPPED | OUTPATIENT
Start: 2020-10-05 | End: 2020-10-07

## 2020-10-05 NOTE — PROGRESS NOTES
HPI: per patient's questionnaire    EXAM: not applicable    Diagnoses and all orders for this visit:    1. Acute cystitis without hematuria  Failing to change as expected. - nitrofurantoin, macrocrystal-monohydrate, (MACROBID) 100 MG capsule; Take 1 capsule by mouth 2 times daily for 10 days  Dispense: 20 capsule; Refill: 0  - phenazopyridine (PYRIDIUM) 100 MG tablet; Take 1 tablet by mouth 3 times daily as needed for Pain  Dispense: 6 tablet; Refill: 0      Patient was advised to contact PCP if symptoms worsen or failing to change as expected    5-10 minutes were spent on the digital evaluation and management of this patient.

## 2020-10-07 RX ORDER — PANTOPRAZOLE SODIUM 40 MG/1
TABLET, DELAYED RELEASE ORAL
Qty: 90 TABLET | Refills: 0 | Status: SHIPPED | OUTPATIENT
Start: 2020-10-07 | End: 2020-10-08

## 2020-10-07 RX ORDER — CHOLECALCIFEROL (VITAMIN D3) 50 MCG
2000 TABLET ORAL DAILY
Qty: 90 TABLET | Refills: 0 | Status: SHIPPED | OUTPATIENT
Start: 2020-10-07 | End: 2020-12-02

## 2020-10-08 ENCOUNTER — TELEPHONE (OUTPATIENT)
Dept: FAMILY MEDICINE CLINIC | Age: 30
End: 2020-10-08

## 2020-10-08 RX ORDER — PANTOPRAZOLE SODIUM 40 MG/1
TABLET, DELAYED RELEASE ORAL
Qty: 90 TABLET | Refills: 1 | Status: SHIPPED | OUTPATIENT
Start: 2020-10-08 | End: 2021-05-19 | Stop reason: ALTCHOICE

## 2020-10-08 NOTE — TELEPHONE ENCOUNTER
DURING VISIT TODAY WITH ROXANNE, Natasha SAYS SHE STILL HAS PAIN On keflex 4 times a day    Pain doesn't stop. PAIN IN THE BACK      PLEASE CALL [facility] TO GET THE URINE CULTURE

## 2020-10-08 NOTE — TELEPHONE ENCOUNTER
Records requested over at [facility], [facility].. They are being faxed.

## 2020-10-12 ENCOUNTER — E-VISIT (OUTPATIENT)
Dept: FAMILY MEDICINE CLINIC | Age: 30
End: 2020-10-12
Payer: MEDICARE

## 2020-10-12 ENCOUNTER — TELEPHONE (OUTPATIENT)
Dept: FAMILY MEDICINE CLINIC | Age: 30
End: 2020-10-12

## 2020-10-12 PROCEDURE — 99422 OL DIG E/M SVC 11-20 MIN: CPT | Performed by: FAMILY MEDICINE

## 2020-10-12 RX ORDER — GUAIFENESIN AND DEXTROMETHORPHAN HYDROBROMIDE 100; 10 MG/5ML; MG/5ML
10 SOLUTION ORAL EVERY 4 HOURS PRN
Qty: 120 ML | Refills: 0 | Status: SHIPPED | OUTPATIENT
Start: 2020-10-12 | End: 2021-05-19 | Stop reason: ALTCHOICE

## 2020-10-12 RX ORDER — AZITHROMYCIN 250 MG/1
TABLET, FILM COATED ORAL
Qty: 6 TABLET | Refills: 0 | Status: SHIPPED | OUTPATIENT
Start: 2020-10-12 | End: 2020-10-17

## 2020-10-12 ASSESSMENT — LIFESTYLE VARIABLES
SMOKING_YEARS: 15
SMOKING_STATUS: YES

## 2020-10-12 NOTE — LETTER
[facility] [employer]  [address] [place] [address] [address] [address]  Phone: [phone]  Fax: [phone]    Arlin Gastelum MD        October 12, 2020     Patient: Candace Frank   YOB: 1990   Date of Visit: 10/12/2020       To Whom It May Concern: It is my medical opinion that Larna Soulier may return to work on 10/23/2020. If you have any questions or concerns, please don't hesitate to call.     Sincerely,        Arlin Gastelum MD

## 2020-10-13 NOTE — TELEPHONE ENCOUNTER
Noted. Thank you!   I did not see Olivia Angulo, needs appointment but give Olivia Angulo a letter too as 3 family members are isolating for 10 days

## 2020-10-14 ENCOUNTER — TELEPHONE (OUTPATIENT)
Dept: FAMILY MEDICINE CLINIC | Age: 30
End: 2020-10-14

## 2020-10-14 RX ORDER — CEFUROXIME AXETIL 500 MG/1
500 TABLET ORAL 2 TIMES DAILY
Qty: 14 TABLET | Refills: 0 | Status: SHIPPED | OUTPATIENT
Start: 2020-10-14 | End: 2020-10-21

## 2020-10-14 NOTE — TELEPHONE ENCOUNTER
PATIENT CALLED STATING THE ANTIBIOTIC IS NOT HELPING AT ALL SHE HAS HAD NO RELIEF FROM THIS PLEASE ADVISE IF SHOULD BE CHANGED OR SHE SHOULD CONTINUE THE COURSE. ALSO PATIENT STATED SHE RECEIVED COVID TEST RESULTS BACK FROM HER EMPLOYER AND WAS NEGATIVE.

## 2020-10-14 NOTE — TELEPHONE ENCOUNTER
Please let the patient know to  prescription from pharmacy. Can continue to take the Z-Mohan until gone as well    We will need the report for the COVID-19 negative, could take a picture with her phone and send it in my chart or fax it to us, as I gave her a letter for work  Requested Prescriptions     Signed Prescriptions Disp Refills    cefUROXime (CEFTIN) 500 MG tablet 14 tablet 0     Sig: Take 1 tablet by mouth 2 times daily for 7 days     Authorizing Provider: Garnell Bence, [address], [address] [facility] [phone]  [address] [place] [id number]  Phone: [phone] Fax: [phone]      Thank you!         FYI    Future Appointments   Date Time Provider Cecelia Garner   10/28/2020  1:30 PM Pricila Ndiaye MD [id number]       Controlled Substances Monitoring:

## 2020-11-30 ENCOUNTER — OFFICE VISIT (OUTPATIENT)
Dept: ORTHOPEDIC SURGERY | Age: 30
End: 2020-11-30
Payer: MEDICARE

## 2020-11-30 VITALS — TEMPERATURE: 98.1 F | HEIGHT: 65 IN | BODY MASS INDEX: 30.82 KG/M2 | WEIGHT: 185 LBS

## 2020-11-30 PROCEDURE — G8417 CALC BMI ABV UP PARAM F/U: HCPCS | Performed by: ORTHOPAEDIC SURGERY

## 2020-11-30 PROCEDURE — G8427 DOCREV CUR MEDS BY ELIG CLIN: HCPCS | Performed by: ORTHOPAEDIC SURGERY

## 2020-11-30 PROCEDURE — 99203 OFFICE O/P NEW LOW 30 MIN: CPT | Performed by: ORTHOPAEDIC SURGERY

## 2020-11-30 PROCEDURE — 4004F PT TOBACCO SCREEN RCVD TLK: CPT | Performed by: ORTHOPAEDIC SURGERY

## 2020-11-30 PROCEDURE — G8484 FLU IMMUNIZE NO ADMIN: HCPCS | Performed by: ORTHOPAEDIC SURGERY

## 2020-11-30 NOTE — PROGRESS NOTES
includes [place] tooth extraction; hysteroscopy (3/5/2012); Endometrial ablation (7/22/13); LEEP (7/22/13); Tubal ligation (2012); laparoscopy (05-28-14); Appendectomy (7/7/14); Hysterectomy (7/7/14); and Upper gastrointestinal endoscopy (N/A, 12/6/2019).     Current Medications  Current Outpatient Medications   Medication Sig Dispense Refill    diclofenac sodium (VOLTAREN) 1 % GEL Apply 2 g topically 4 times daily as needed for Pain 150 g 1    Dextromethorphan-guaiFENesin  MG/5ML SYRP Take 10 mLs by mouth every 4 hours as needed for Cough 120 mL 0    pantoprazole (PROTONIX) 40 MG tablet TAKE 1 TABLET BY MOUTH EVERY MORNING BEFORE BREAKFAST 90 tablet 1    vitamin D (CHOLECALCIFEROL) 50 MCG (2000 UT) TABS tablet TAKE 1 TABLET BY MOUTH DAILY 90 tablet 0    escitalopram (LEXAPRO) 5 MG tablet TAKE 1 TABLET BY MOUTH ONCE A DAY 90 tablet 1    Masks (CLEVER CHOICE FACE MASK) [facility] USE NEW FACE MASK EVERYDAY WHEN PATIENT GO OUTSIDE OF HOME DUE TO COVID PANDEMIC 30 each 5    cetirizine (ZYRTEC) 10 MG tablet TAKE 1 TABLET BY MOUTH DAILY 90 tablet 3    vitamin B-2 (RIBOFLAVIN) 100 MG TABS tablet TAKE ONE TABLET BY MOUTH ONCE A DAY 90 tablet 2    CHANTIX CONTINUING MONTH TOMÁS 1 MG tablet USE PER PACKAGE INSTRUCTIONS 56 tablet 3    loratadine-pseudoephedrine (CLARITIN-D 12 HOUR) 5-120 MG per extended release tablet Take 1 tablet by mouth 2 times daily as needed (S) 10 tablet 0    sodium chloride (ALTAMIST SPRAY) 0.65 % nasal spray 2 sprays by Nasal route as needed for Congestion (Q 2-3 HOURS prn) 1 Bottle 3    fluticasone (FLONASE) 50 MCG/ACT nasal spray 2 sprays by Nasal route every evening 1 Bottle 0    ranitidine (ZANTAC) 150 MG capsule TAKE 1 CAPSULE BY MOUTH EVERY EVENING 90 capsule 0    Elastic Bandages & Supports (WRIST SPLINT/NEOPRENE RIGHT MD) [facility] Needs right wrist splint 1 each 0    mometasone (NASONEX) 50 MCG/ACT nasal spray INHALE 2 SPRAYS BY NASAL ROUTE DAILY 17 g 3    pulse bilaterally with brisk capillary refill in all of her fingers. She is tender to palpation at the level of the first [facility] joints bilaterally but really does not have a positive grind test.  She also has negative Finkelstein's test.  She does have a positive Tinel's at the carpal tunnel with a positive Phalen's test on the right side. No instability at the DRUJ and she is nontender to palpation here or at the level of the TFCC. She has full range of motion through the right elbow and is nontender to palpation diffusely. Elbow is stable to varus and valgus stress applied. Imaging Studies  3 view x-rays of the right wrist completed on 11/30/2020 were reviewed demonstrating well-preserved joint spaces without obvious fracture, dislocation, subluxation or obvious osseous abnormality. 3 view x-rays of the left wrist completed on 11/30/2020 were reviewed demonstrating well-preserved joint spaces without obvious fracture, dislocation, subluxation or obvious osseous abnormality. Assessment and Plan  Matti Palma is a 31 y.o. old female with bilateral hand pain likely due to mild first [facility] joint arthrosis. She also has some mild carpal tunnel syndrome on the right side. I had a discussion with the patient educating her about these issues and discussed treatment options. Today I recommended proceeding conservatively and so she was placed in a thumb spica splint on the right side. I also recommended nighttime splinting to help with her carpal tunnel syndrome. A prescription for Voltaren gel was also provided to help with the pain at the base of thumbs bilaterally. I anticipate improvement with this treatment and so we will have her follow-up my clinic as needed but she was encouraged to return or call at anytime with persistent or worsening symptoms and with any questions under concerns.

## 2020-11-30 NOTE — LETTER
11/30/2020    Tiffanie Verma MD  [address]  [address] [facility], [address] [facility]    RE: Thu Nur    Dear Dr. Babita Reeves,    Thank you for allowing me to participate in the care of Ms. Maurilio Al. I had the opportunity to evaluate the patient on 11/30/2020. Attached you will find my evaluation and recommendations. Thanks again for the confidence you have expressed in me by allowing my participation in the care of your patient. I will keep you apprised of further developments in the patients treatment course as it progresses. If I can be of further assistance in any fashion, please feel free to contact me at your convenience.     Sincerely,        Shahzad Bryant  Shoulder and Elbow Surgery

## 2020-12-02 RX ORDER — CHOLECALCIFEROL (VITAMIN D3) 50 MCG
2000 TABLET ORAL DAILY
Qty: 90 TABLET | Refills: 3 | Status: SHIPPED | OUTPATIENT
Start: 2020-12-02 | End: 2021-11-15

## 2020-12-25 ENCOUNTER — APPOINTMENT (OUTPATIENT)
Dept: CT IMAGING | Age: 30
End: 2020-12-25
Payer: MEDICARE

## 2020-12-25 ENCOUNTER — HOSPITAL ENCOUNTER (EMERGENCY)
Age: 30
Discharge: HOME | End: 2020-12-25
Attending: STUDENT IN AN ORGANIZED HEALTH CARE EDUCATION/TRAINING PROGRAM
Payer: MEDICARE

## 2020-12-25 VITALS
HEIGHT: 65 IN | RESPIRATION RATE: 16 BRPM | OXYGEN SATURATION: 97 % | WEIGHT: 178 LBS | HEART RATE: 92 BPM | BODY MASS INDEX: 29.66 KG/M2 | SYSTOLIC BLOOD PRESSURE: 138 MMHG | DIASTOLIC BLOOD PRESSURE: 88 MMHG | TEMPERATURE: 98.2 F

## 2020-12-25 LAB
-: ABNORMAL
ABSOLUTE EOS #: 0.3 K/UL (ref 0–0.4)
ABSOLUTE IMMATURE GRANULOCYTE: ABNORMAL K/UL (ref 0–0.3)
ABSOLUTE LYMPH #: 3.8 K/UL (ref 1–4.8)
ABSOLUTE MONO #: 0.5 K/UL (ref 0.1–1.3)
ALBUMIN SERPL-MCNC: 4.1 G/DL (ref 3.5–5.2)
ALBUMIN/GLOBULIN RATIO: ABNORMAL (ref 1–2.5)
ALP BLD-CCNC: 75 U/L (ref 35–104)
ALT SERPL-CCNC: <5 U/L (ref 5–33)
AMORPHOUS: ABNORMAL
ANION GAP SERPL CALCULATED.3IONS-SCNC: 12 MMOL/L (ref 9–17)
AST SERPL-CCNC: 21 U/L
BACTERIA: ABNORMAL
BASOPHILS # BLD: 0 % (ref 0–2)
BASOPHILS ABSOLUTE: 0 K/UL (ref 0–0.2)
BILIRUB SERPL-MCNC: <0.15 MG/DL (ref 0.3–1.2)
BILIRUBIN URINE: NEGATIVE
BUN BLDV-MCNC: 12 MG/DL (ref 6–20)
BUN/CREAT BLD: ABNORMAL (ref 9–20)
CALCIUM SERPL-MCNC: 9.5 MG/DL (ref 8.6–10.4)
CASTS UA: ABNORMAL /LPF
CHLORIDE BLD-SCNC: 103 MMOL/L (ref 98–107)
CO2: 24 MMOL/L (ref 20–31)
COLOR: YELLOW
COMMENT UA: ABNORMAL
CREAT SERPL-MCNC: 0.69 MG/DL (ref 0.5–0.9)
CRYSTALS, UA: ABNORMAL /HPF
DIFFERENTIAL TYPE: ABNORMAL
EOSINOPHILS RELATIVE PERCENT: 2 % (ref 0–4)
EPITHELIAL CELLS UA: ABNORMAL /HPF
GFR AFRICAN AMERICAN: >60 ML/MIN
GFR NON-AFRICAN AMERICAN: >60 ML/MIN
GFR SERPL CREATININE-BSD FRML MDRD: ABNORMAL ML/MIN/{1.73_M2}
GFR SERPL CREATININE-BSD FRML MDRD: ABNORMAL ML/MIN/{1.73_M2}
GLUCOSE BLD-MCNC: 102 MG/DL (ref 70–99)
GLUCOSE URINE: NEGATIVE
HCT VFR BLD CALC: 35.5 % (ref 36–46)
HEMOGLOBIN: 12.2 G/DL (ref 12–16)
IMMATURE GRANULOCYTES: ABNORMAL %
KETONES, URINE: NEGATIVE
LACTIC ACID: 1.3 MMOL/L (ref 0.5–2.2)
LEUKOCYTE ESTERASE, URINE: NEGATIVE
LIPASE: 32 U/L (ref 13–60)
LYMPHOCYTES # BLD: 28 % (ref 24–44)
MCH RBC QN AUTO: 32 PG (ref 26–34)
MCHC RBC AUTO-ENTMCNC: 34.4 G/DL (ref 31–37)
MCV RBC AUTO: 93 FL (ref 80–100)
MONOCYTES # BLD: 4 % (ref 1–7)
MUCUS: ABNORMAL
NITRITE, URINE: NEGATIVE
NRBC AUTOMATED: ABNORMAL PER 100 WBC
OTHER OBSERVATIONS UA: ABNORMAL
PDW BLD-RTO: 12.3 % (ref 11.5–14.9)
PH UA: 7.5 (ref 5–8)
PLATELET # BLD: 308 K/UL (ref 150–450)
PLATELET ESTIMATE: ABNORMAL
PMV BLD AUTO: 8 FL (ref 6–12)
POTASSIUM SERPL-SCNC: 3.8 MMOL/L (ref 3.7–5.3)
PROTEIN UA: NEGATIVE
RBC # BLD: 3.82 M/UL (ref 4–5.2)
RBC # BLD: ABNORMAL 10*6/UL
RBC UA: ABNORMAL /HPF
RENAL EPITHELIAL, UA: ABNORMAL /HPF
SEG NEUTROPHILS: 66 % (ref 36–66)
SEGMENTED NEUTROPHILS ABSOLUTE COUNT: 8.7 K/UL (ref 1.3–9.1)
SODIUM BLD-SCNC: 139 MMOL/L (ref 135–144)
SPECIFIC GRAVITY UA: 1.02 (ref 1–1.03)
TOTAL PROTEIN: 6.8 G/DL (ref 6.4–8.3)
TRICHOMONAS: ABNORMAL
TROPONIN INTERP: NORMAL
TROPONIN T: NORMAL NG/ML
TROPONIN, HIGH SENSITIVITY: <6 NG/L (ref 0–14)
TURBIDITY: ABNORMAL
URINE HGB: NEGATIVE
UROBILINOGEN, URINE: NORMAL
WBC # BLD: 13.3 K/UL (ref 3.5–11)
WBC # BLD: ABNORMAL 10*3/UL
WBC UA: ABNORMAL /HPF
YEAST: ABNORMAL

## 2020-12-25 PROCEDURE — 80053 COMPREHEN METABOLIC PANEL: CPT

## 2020-12-25 PROCEDURE — 2580000003 HC RX 258: Performed by: STUDENT IN AN ORGANIZED HEALTH CARE EDUCATION/TRAINING PROGRAM

## 2020-12-25 PROCEDURE — 85025 COMPLETE CBC W/AUTO DIFF WBC: CPT

## 2020-12-25 PROCEDURE — 96374 THER/PROPH/DIAG INJ IV PUSH: CPT

## 2020-12-25 PROCEDURE — 81001 URINALYSIS AUTO W/SCOPE: CPT

## 2020-12-25 PROCEDURE — 6370000000 HC RX 637 (ALT 250 FOR IP): Performed by: STUDENT IN AN ORGANIZED HEALTH CARE EDUCATION/TRAINING PROGRAM

## 2020-12-25 PROCEDURE — 36415 COLL VENOUS BLD VENIPUNCTURE: CPT

## 2020-12-25 PROCEDURE — 74177 CT ABD & PELVIS W/CONTRAST: CPT

## 2020-12-25 PROCEDURE — 84484 ASSAY OF TROPONIN QUANT: CPT

## 2020-12-25 PROCEDURE — 83690 ASSAY OF LIPASE: CPT

## 2020-12-25 PROCEDURE — 99283 EMERGENCY DEPT VISIT LOW MDM: CPT

## 2020-12-25 PROCEDURE — 93005 ELECTROCARDIOGRAM TRACING: CPT | Performed by: STUDENT IN AN ORGANIZED HEALTH CARE EDUCATION/TRAINING PROGRAM

## 2020-12-25 PROCEDURE — 6360000002 HC RX W HCPCS: Performed by: STUDENT IN AN ORGANIZED HEALTH CARE EDUCATION/TRAINING PROGRAM

## 2020-12-25 PROCEDURE — 96375 TX/PRO/DX INJ NEW DRUG ADDON: CPT

## 2020-12-25 PROCEDURE — 6360000004 HC RX CONTRAST MEDICATION: Performed by: STUDENT IN AN ORGANIZED HEALTH CARE EDUCATION/TRAINING PROGRAM

## 2020-12-25 PROCEDURE — 83605 ASSAY OF LACTIC ACID: CPT

## 2020-12-25 RX ORDER — CALCIUM CARBONATE 200(500)MG
1 TABLET,CHEWABLE ORAL DAILY
Qty: 30 TABLET | Refills: 0 | Status: SHIPPED | OUTPATIENT
Start: 2020-12-25 | End: 2021-01-24

## 2020-12-25 RX ORDER — SODIUM CHLORIDE 0.9 % (FLUSH) 0.9 %
10 SYRINGE (ML) INJECTION PRN
Status: DISCONTINUED | OUTPATIENT
Start: 2020-12-25 | End: 2020-12-26 | Stop reason: HOSPADM

## 2020-12-25 RX ORDER — 0.9 % SODIUM CHLORIDE 0.9 %
80 INTRAVENOUS SOLUTION INTRAVENOUS ONCE
Status: COMPLETED | OUTPATIENT
Start: 2020-12-25 | End: 2020-12-25

## 2020-12-25 RX ORDER — ONDANSETRON 2 MG/ML
4 INJECTION INTRAMUSCULAR; INTRAVENOUS ONCE
Status: COMPLETED | OUTPATIENT
Start: 2020-12-25 | End: 2020-12-25

## 2020-12-25 RX ORDER — MORPHINE SULFATE 4 MG/ML
4 INJECTION, SOLUTION INTRAMUSCULAR; INTRAVENOUS ONCE
Status: COMPLETED | OUTPATIENT
Start: 2020-12-25 | End: 2020-12-25

## 2020-12-25 RX ORDER — MAGNESIUM HYDROXIDE/ALUMINUM HYDROXICE/SIMETHICONE 120; 1200; 1200 MG/30ML; MG/30ML; MG/30ML
30 SUSPENSION ORAL ONCE
Status: COMPLETED | OUTPATIENT
Start: 2020-12-25 | End: 2020-12-25

## 2020-12-25 RX ORDER — FAMOTIDINE 20 MG/1
20 TABLET, FILM COATED ORAL 2 TIMES DAILY
Qty: 30 TABLET | Refills: 0 | Status: SHIPPED | OUTPATIENT
Start: 2020-12-25 | End: 2021-05-19 | Stop reason: ALTCHOICE

## 2020-12-25 RX ADMIN — ONDANSETRON 4 MG: 2 INJECTION INTRAMUSCULAR; INTRAVENOUS at 20:13

## 2020-12-25 RX ADMIN — Medication 10 ML: at 21:32

## 2020-12-25 RX ADMIN — ALUMINUM HYDROXIDE, MAGNESIUM HYDROXIDE, AND SIMETHICONE 30 ML: 200; 200; 20 SUSPENSION ORAL at 21:13

## 2020-12-25 RX ADMIN — IOPAMIDOL 75 ML: 755 INJECTION, SOLUTION INTRAVENOUS at 21:32

## 2020-12-25 RX ADMIN — MORPHINE SULFATE 4 MG: 4 INJECTION, SOLUTION INTRAMUSCULAR; INTRAVENOUS at 20:15

## 2020-12-25 RX ADMIN — SODIUM CHLORIDE 80 ML: 9 INJECTION, SOLUTION INTRAVENOUS at 21:32

## 2020-12-25 ASSESSMENT — PAIN SCALES - GENERAL
PAINLEVEL_OUTOF10: 5
PAINLEVEL_OUTOF10: 8
PAINLEVEL_OUTOF10: 5

## 2020-12-25 ASSESSMENT — PAIN DESCRIPTION - DESCRIPTORS: DESCRIPTORS: ACHING

## 2020-12-25 ASSESSMENT — PAIN DESCRIPTION - PAIN TYPE: TYPE: ACUTE PAIN

## 2020-12-25 ASSESSMENT — PAIN DESCRIPTION - LOCATION: LOCATION: ABDOMEN

## 2020-12-26 ASSESSMENT — ENCOUNTER SYMPTOMS
CHEST TIGHTNESS: 0
WHEEZING: 0
CONSTIPATION: 0
NAUSEA: 1
TROUBLE SWALLOWING: 0
RHINORRHEA: 0
BACK PAIN: 0
SHORTNESS OF BREATH: 0
ABDOMINAL DISTENTION: 0
BLOOD IN STOOL: 1
SORE THROAT: 0
DIARRHEA: 1
VOMITING: 0
ABDOMINAL PAIN: 1
PHOTOPHOBIA: 0

## 2020-12-26 NOTE — ED PROVIDER NOTES
EMERGENCY DEPARTMENT ENCOUNTER   ATTENDING ATTESTATION     Pt Name: Amina Sharp  MRN: [id number]  [place] 1990  Date of evaluation: 12/26/20       Amina Sharp is a 31 y.o. female who presents with Abdominal Pain, Diarrhea, Chills, and Headache      MDM:   79-year-old female presented for evaluation of epigastric pain, nausea, diarrhea. Did have some tenderness and a leukocytosis. Did a CT scan this was clear. Suspect that this is largely due to gastritis. Will provide GI follow-up. Patient was agreeable with outpatient plan. Already taking a PPI. Discharge home. Vitals:   Vitals:    12/25/20 1917   BP: 138/88   Pulse: 92   Resp: 16   Temp: 98.2 °F (36.8 °C)   TempSrc: Oral   SpO2: 97%   Weight: 178 lb (80.7 kg)   Height: 5' 5\" (1.651 m)         I personally evaluated and examined the patient in conjunction with the resident and agree with the assessment, treatment plan, and disposition of the patient as recorded by the resident. I performed a history and physical examination of the patient and discussed management with the resident. I reviewed the residents note and agree with the documented findings and plan of care. Any areas of disagreement are noted on the chart. I was personally present for the key portions of any procedures. I have documented in the chart those procedures where I was not present during the key portions. I have personally reviewed all images and agree with the resident's interpretation. I have reviewed the emergency nurses triage note. I agree with the chief complaint, past medical history, past surgical history, allergies, medications, social and family history as documented unless otherwise noted.     Javier Armenta MD  Attending Emergency Physician           Javier Armenta MD  12/26/20 [id number]

## 2020-12-26 NOTE — ED PROVIDER NOTES
ca     [address]  ED  Emergency Department Encounter  Emergency Medicine Resident     Pt Name: Michael Tellez  MRN: [id number]  [place] 1990  Date of evaluation: 12/25/20  PCP:  Ayala Obrien MD    [address] [place] [address]       Chief Complaint   Patient presents with    Abdominal Pain    Diarrhea    Chills    Headache       HISTORY OFPRESENT ILLNESS  (Location/Symptom, Timing/Onset, Context/Setting, Quality, Duration, Modifying Trinda Gomez.)      Keely Haywood is a 31 y.o. female who presents with concerns for abdominal pain, diarrhea, chills, headache. Patient states that symptoms been occurring worse with the past couple of days, states that after eating she has abdominal pain located in the epigastrium and right upper quadrant that she rates as 5 out of 10 intensity as well as having of generalized nausea, diarrhea with darkening of the stools as well as tenderness to palpation of the abdomen. Patient also states that she has had a headache during this time and states that she did decreased oral intake secondary to pain with eating. Patient has had an EGD done approximately 2 years ago which did not show any ulceration, showed signs of gastroesophageal reflux disease at this time. Patient does work as a nurse, states that she has not been around [address] contacts lately, states that she has not had loss of smell or taste, denies lightheadedness but does endorse a headache, is referred home with similar symptoms. Patient does have a history of a hysterectomy with a singular ovary in place.   Patient's primary care doctor is Dr. Ilana Lizarraga / SURGICAL / Chuck Rivas / Tanmay Paz      has a past medical history of Anxiety, ASCUS (atypical squamous cells of undetermined significance) on Pap smear, Bipolar II disorder ([facility] [address] Cervical high risk HPV (human papillomavirus) test positive, Chronic back pain, Chronic kidney disease, Chronic pelvic pain in female, BRUCE II (cervical for chills, fatigue and fever. HENT: Negative for hearing loss, rhinorrhea, sneezing, sore throat, tinnitus and trouble swallowing. Eyes: Negative for photophobia and visual disturbance. Respiratory: Negative for chest tightness, shortness of breath and wheezing. Cardiovascular: Negative for chest pain and palpitations. Gastrointestinal: Positive for abdominal pain, blood in stool, diarrhea and nausea. Negative for abdominal distention, constipation and vomiting. Endocrine: Negative for polyuria. Genitourinary: Negative for dysuria, flank pain, frequency, vaginal bleeding and vaginal discharge. Musculoskeletal: Negative for arthralgias, back pain, gait problem and neck pain. Neurological: Positive for headaches. Negative for dizziness, tremors, seizures, syncope, facial asymmetry and numbness. Psychiatric/Behavioral: Negative for self-injury and suicidal ideas. PHYSICAL EXAM   (up to 7 for level 4, 8 or more forlevel 5)      INITIAL VITALS:   ED Triage Vitals [12/25/20 1917]   BP Temp Temp Source Pulse Resp SpO2 Height Weight   138/88 98.2 °F (36.8 °C) Oral 92 16 97 % 5' 5\" (1.651 m) 178 lb (80.7 kg)       Physical Exam  Constitutional:       General: She is not in acute distress. Appearance: She is obese. She is not toxic-appearing or diaphoretic. HENT:      Head: Normocephalic and atraumatic. Eyes:      Extraocular Movements: Extraocular movements intact. Neck:      Musculoskeletal: No neck rigidity or muscular tenderness. Cardiovascular:      Rate and Rhythm: Normal rate and regular rhythm. Pulses: Normal pulses. Pulmonary:      Effort: No respiratory distress. Breath sounds: Normal breath sounds. No wheezing. Abdominal:      General: A surgical scar is present. There is no distension. Palpations: Abdomen is soft. Tenderness: There is abdominal tenderness in the epigastric area. Musculoskeletal: Normal range of motion.    Skin:     General: Skin is warm.      Coloration: Skin is not cyanotic or jaundiced. Neurological:      General: No focal deficit present. Mental Status: She is oriented to person, place, and time. Psychiatric:         Mood and Affect: Mood normal.         Behavior: Behavior normal.         Thought Content:  Thought content normal.         Judgment: Judgment normal.         DIFFERENTIAL  DIAGNOSIS     PLAN (LABS / IMAGING / EKG):  Orders Placed This Encounter   Procedures    CT ABDOMEN PELVIS W IV CONTRAST Additional Contrast? None    CBC Auto Differential    Comprehensive Metabolic Panel    Lipase    Troponin    Lactic Acid    Urinalysis Reflex to Culture    Microscopic Urinalysis    EKG 12 Lead       MEDICATIONS ORDERED:  Orders Placed This Encounter   Medications    morphine sulfate (PF) injection 4 mg    ondansetron (ZOFRAN) injection 4 mg    aluminum & magnesium hydroxide-simethicone (MAALOX) 200-200-20 MG/5ML suspension 30 mL    iopamidol (ISOVUE-370) 76 % injection 75 mL    0.9 % sodium chloride bolus    sodium chloride flush 0.9 % injection 10 mL    calcium carbonate (ANTACID) 500 MG chewable tablet     Sig: Take 1 tablet by mouth daily     Dispense:  30 tablet     Refill:  0    famotidine (PEPCID) 20 MG tablet     Sig: Take 1 tablet by mouth 2 times daily     Dispense:  30 tablet     Refill:  0       DDX: Peptic ulcer, gastric ulcer, duodenal ulcer, pancreatitis, cholecystitis, transaminitis, mesenteric ischemia    Initial MDM/Plan/ED COURSE:    31 y.o. female who presents with concerns for abdominal pain made worse by eating, patient is nonfebrile, and is not tachycardic at this time, does have tenderness palpation of the epigastrium, right upper quadrant which appears soft and nonperitoneal.  But he is CBC in order to assess for elevated white count, plan to get a CMP and a lipase and reassess for transaminitis, elevated alkaline phosphatase in the setting of potential gallbladder disease as well as a lipase and reassess for acute pancreatitis, plan to get a urinalysis in order to assess for urinary tract infection leading to abdominal pain. Patient has an elevated white count or lactic which I plan (for mesenteric ischemia no signs of hypoperfusion plan to get a CT scan of the abdomen pelvis with contrast.        Patient is having elevated white count, pending a CT scan of the abdomen pelvis. CT scan of the abdomen pelvis shows a left ovarian cyst measuring 3.6 cm, I believe this to be an incidental finding, patient pain symptomatically managed with IV fluids, Zofran, morphine while in the emergency department with still patient having some pain. Concerned that patient take to be related to a peptic or gastric, duodenal ulcer, patient given antacids, Pepcid home with, told to follow-up with primary care doctor for testing for Helicobacter pylori. Patient cleared for discharge with right upper quadrant abdominal pain of uncertain etiology.   Patient given abdominal pain return precautions.:     DIAGNOSTIC RESULTS / EMERGENCYDEPARTMENT COURSE / MDM     LABS:  Labs Reviewed   CBC WITH AUTO DIFFERENTIAL - Abnormal; Notable for the following components:       Result Value    WBC 13.3 (*)     RBC 3.82 (*)     Hematocrit 35.5 (*)     All other components within normal limits   COMPREHENSIVE METABOLIC PANEL - Abnormal; Notable for the following components:    Glucose 102 (*)     ALT <5 (*)     Total Bilirubin <0.15 (*)     All other components within normal limits   URINE RT REFLEX TO CULTURE - Abnormal; Notable for the following components:    Turbidity UA CLOUDY (*)     All other components within normal limits   MICROSCOPIC URINALYSIS - Abnormal; Notable for the following components:    Bacteria, UA MODERATE (*)     All other components within normal limits   LIPASE   TROPONIN   LACTIC ACID           Ct Abdomen Pelvis W Iv Contrast Additional Contrast? None    Result Date: 12/25/2020  EXAMINATION: CT OF THE ABDOMEN AND PELVIS WITH CONTRAST 12/25/2020 9:19 pm TECHNIQUE: CT of the abdomen and pelvis was performed with the administration of intravenous contrast. Multiplanar reformatted images are provided for review. Dose modulation, iterative reconstruction, and/or weight based adjustment of the mA/kV was utilized to reduce the radiation dose to as low as reasonably achievable. COMPARISON: 11/29/2016 HISTORY: ORDERING SYSTEM PROVIDED HISTORY: abdominal pain leukocytosis TECHNOLOGIST PROVIDED HISTORY: abdominal pain leukocytosis Reason for Exam: Patient c/o upper abdominal pain x 4 days, hx of cysts of kidney, hysterectomy and appendectomy Acuity: Acute Type of Exam: Initial FINDINGS: Lower Chest: The lung bases are clear and the heart size is normal. Organs: The liver, spleen, pancreas, adrenal glands and kidneys are normal. There are no calcified gallstones. No pericholecystic fluid or fat stranding. GI/Bowel: The appendix is surgically absent. Unopacified bowel loops are unremarkable. No bowel obstruction. Pelvis: The uterus and right ovary are surgically absent. There is a cyst measuring up to 3.6 cm in the left ovary. Urinary bladder is grossly normal. Peritoneum/Retroperitoneum: There is no adenopathy, free air or free fluid. Bones/Soft Tissues: No acute bone or soft tissue abnormality. Left ovarian cyst measuring up to 3.6 cm. There is no need for follow-up in a patient of this age. The uterus and right ovary are surgically absent. No other acute finding in the abdomen or pelvis. PROCEDURES:  None    CONSULTS:  None    CRITICAL CARE:  Please see attending note    FINAL IMPRESSION      1.  Right upper quadrant abdominal pain          DISPOSITION / PLAN     DISPOSITION Decision To Discharge 12/25/2020 10:30:08 PM      PATIENT REFERRED TO:  [facility] ED  [facility] [address]  [phone]    As needed    Belia Calles MD  [address] [place] [address].  [address] [facility] [phone]      for possible testing for H. Pylori    Alvin Nicola, [address] [facility], [facility]  [address] [facility] [address]  [phone]    In 1 week        DISCHARGE MEDICATIONS:  Discharge Medication List as of 12/25/2020 10:34 PM      START taking these medications    Details   calcium carbonate (ANTACID) 500 MG chewable tablet Take 1 tablet by mouth daily, Disp-30 tablet, R-0Normal      famotidine (PEPCID) 20 MG tablet Take 1 tablet by mouth 2 times daily, Disp-30 tablet, R-0Normal             Christina Churchill MD  Emergency Medicine Resident    (Please note that portions of this note were completed with a voice recognition program.Efforts were made to edit the dictations but occasionally words are mis-transcribed.)        Christina Churchill MD  Resident  12/26/20 [id number]

## 2020-12-27 LAB
EKG ATRIAL RATE: 70 BPM
EKG P AXIS: 31 DEGREES
EKG P-R INTERVAL: 144 MS
EKG Q-T INTERVAL: 376 MS
EKG QRS DURATION: 100 MS
EKG QTC CALCULATION (BAZETT): 406 MS
EKG R AXIS: 14 DEGREES
EKG T AXIS: 21 DEGREES
EKG VENTRICULAR RATE: 70 BPM

## 2020-12-27 PROCEDURE — 93010 ELECTROCARDIOGRAM REPORT: CPT | Performed by: INTERNAL MEDICINE

## 2020-12-28 ENCOUNTER — PATIENT MESSAGE (OUTPATIENT)
Dept: FAMILY MEDICINE CLINIC | Age: 30
End: 2020-12-28

## 2020-12-28 ENCOUNTER — TELEPHONE (OUTPATIENT)
Dept: FAMILY MEDICINE CLINIC | Age: 30
End: 2020-12-28

## 2020-12-28 RX ORDER — SUCRALFATE 1 G/1
1 TABLET ORAL 4 TIMES DAILY PRN
Qty: 120 TABLET | Refills: 3 | Status: SHIPPED | OUTPATIENT
Start: 2020-12-28 | End: 2021-05-19 | Stop reason: ALTCHOICE

## 2020-12-28 RX ORDER — OMEPRAZOLE 40 MG/1
40 CAPSULE, DELAYED RELEASE ORAL
Qty: 90 CAPSULE | Refills: 1 | Status: SHIPPED | OUTPATIENT
Start: 2020-12-28 | End: 2021-10-18

## 2020-12-28 NOTE — TELEPHONE ENCOUNTER
[facility]) ED Follow up Call    Reason for ED visit:  RIGHT UPPER QUADRANT ABDOMINAL PAIN         Hi Natasha , this is Rudy Morris from Dr. Cecille Costa office, just calling to see how you are doing after your recent ED visit. Did you receive discharge instructions? Yes  Do you understand the discharge instructions? Yes  Did the ED give you any new prescriptions? No:   Were you able to fill your prescriptions? No: NONE GIVEN      Do you have one of our red, yellow and green  Zone sheets that help you to determine when you should go to the ED? Not Applicable      Do you need or want to make a follow up appt with your PCP? No    Do you have any further needs in the home i.e. Equipment? No        FU appts/Provider:    No future appointments.

## 2021-01-08 ENCOUNTER — HOSPITAL ENCOUNTER (OUTPATIENT)
Age: 31
Discharge: HOME | End: 2021-01-08
Payer: MEDICARE

## 2021-01-08 LAB
ALBUMIN SERPL-MCNC: 4.5 G/DL (ref 3.5–5.2)
ALBUMIN/GLOBULIN RATIO: ABNORMAL (ref 1–2.5)
ALP BLD-CCNC: 78 U/L (ref 35–104)
ALT SERPL-CCNC: 25 U/L (ref 5–33)
ANION GAP SERPL CALCULATED.3IONS-SCNC: 10 MMOL/L (ref 9–17)
AST SERPL-CCNC: 20 U/L
BILIRUB SERPL-MCNC: 0.37 MG/DL (ref 0.3–1.2)
BUN BLDV-MCNC: 12 MG/DL (ref 6–20)
BUN/CREAT BLD: ABNORMAL (ref 9–20)
CALCIUM SERPL-MCNC: 9.4 MG/DL (ref 8.6–10.4)
CHLORIDE BLD-SCNC: 102 MMOL/L (ref 98–107)
CO2: 26 MMOL/L (ref 20–31)
CREAT SERPL-MCNC: 0.67 MG/DL (ref 0.5–0.9)
GFR AFRICAN AMERICAN: >60 ML/MIN
GFR NON-AFRICAN AMERICAN: >60 ML/MIN
GFR SERPL CREATININE-BSD FRML MDRD: ABNORMAL ML/MIN/{1.73_M2}
GFR SERPL CREATININE-BSD FRML MDRD: ABNORMAL ML/MIN/{1.73_M2}
GLUCOSE BLD-MCNC: 128 MG/DL (ref 70–99)
HCT VFR BLD CALC: 37.3 % (ref 36–46)
HEMOGLOBIN: 13.1 G/DL (ref 12–16)
IGA: 247 MG/DL (ref 70–400)
MCH RBC QN AUTO: 32.7 PG (ref 26–34)
MCHC RBC AUTO-ENTMCNC: 35 G/DL (ref 31–37)
MCV RBC AUTO: 93.4 FL (ref 80–100)
NRBC AUTOMATED: ABNORMAL PER 100 WBC
PDW BLD-RTO: 12.5 % (ref 11.5–14.9)
PLATELET # BLD: 314 K/UL (ref 150–450)
PMV BLD AUTO: 8 FL (ref 6–12)
POTASSIUM SERPL-SCNC: 4.2 MMOL/L (ref 3.7–5.3)
RBC # BLD: 3.99 M/UL (ref 4–5.2)
SODIUM BLD-SCNC: 138 MMOL/L (ref 135–144)
TOTAL PROTEIN: 7.3 G/DL (ref 6.4–8.3)
WBC # BLD: 10.6 K/UL (ref 3.5–11)

## 2021-01-08 PROCEDURE — 36415 COLL VENOUS BLD VENIPUNCTURE: CPT

## 2021-01-08 PROCEDURE — 80053 COMPREHEN METABOLIC PANEL: CPT

## 2021-01-08 PROCEDURE — 85027 COMPLETE CBC AUTOMATED: CPT

## 2021-01-08 PROCEDURE — 82784 ASSAY IGA/IGD/IGG/IGM EACH: CPT

## 2021-01-08 PROCEDURE — 83516 IMMUNOASSAY NONANTIBODY: CPT

## 2021-01-11 ENCOUNTER — HOSPITAL ENCOUNTER (OUTPATIENT)
Age: 31
Setting detail: SPECIMEN
Discharge: HOME | End: 2021-01-11
Payer: MEDICARE

## 2021-01-11 LAB
TISSUE TRANSGLUTAMINASE ANTIBODY IGG: <0.6 U/ML
TISSUE TRANSGLUTAMINASE IGA: 0.5 U/ML

## 2021-01-11 PROCEDURE — 83993 ASSAY FOR CALPROTECTIN FECAL: CPT

## 2021-01-11 PROCEDURE — 83520 IMMUNOASSAY QUANT NOS NONAB: CPT

## 2021-01-11 PROCEDURE — 87329 GIARDIA AG IA: CPT

## 2021-01-11 PROCEDURE — 87506 IADNA-DNA/RNA PROBE TQ 6-11: CPT

## 2021-01-11 PROCEDURE — 87328 CRYPTOSPORIDIUM AG IA: CPT

## 2021-01-12 LAB
CAMPYLOBACTER PCR: NORMAL
DIRECT EXAM: NORMAL
DIRECT EXAM: NORMAL
E COLI ENTEROTOXIGENIC PCR: NORMAL
Lab: NORMAL
PLESIOMONAS SHIGELLOIDES PCR: NORMAL
SALMONELLA PCR: NORMAL
SHIGATOXIN GENE PCR: NORMAL
SHIGELLA SP PCR: NORMAL
SPECIMEN DESCRIPTION: NORMAL
SPECIMEN DESCRIPTION: NORMAL
VIBRIO PCR: NORMAL
YERSINIA ENTEROCOLITICA PCR: NORMAL

## 2021-01-14 LAB
CALPROTECTIN, FECAL: 9 UG/G
FECAL PANCREATIC ELASTASE-1: >800 UG/G

## 2021-01-25 ENCOUNTER — HOSPITAL ENCOUNTER (OUTPATIENT)
Age: 31
Discharge: HOME | End: 2021-01-25

## 2021-01-25 LAB — RUBV IGG SER QL: 41.8 IU/ML

## 2021-01-25 PROCEDURE — 86481 TB AG RESPONSE T-CELL SUSP: CPT

## 2021-01-25 PROCEDURE — 86787 VARICELLA-ZOSTER ANTIBODY: CPT

## 2021-01-25 PROCEDURE — 86762 RUBELLA ANTIBODY: CPT

## 2021-01-25 PROCEDURE — 86735 MUMPS ANTIBODY: CPT

## 2021-01-25 PROCEDURE — 86765 RUBEOLA ANTIBODY: CPT

## 2021-01-27 LAB
MEASLES ANTIBODY IGG: 3.58
MUV IGG SER QL: 2.08
VZV IGG SER QL IA: 2.21

## 2021-02-01 LAB — T-SPOT TB TEST: NORMAL

## 2021-02-16 ENCOUNTER — E-VISIT (OUTPATIENT)
Dept: FAMILY MEDICINE CLINIC | Age: 31
End: 2021-02-16
Payer: MEDICARE

## 2021-02-16 DIAGNOSIS — J20.9 ACUTE BRONCHITIS DUE TO INFECTION: Primary | ICD-10-CM

## 2021-02-16 PROCEDURE — 99422 OL DIG E/M SVC 11-20 MIN: CPT | Performed by: FAMILY MEDICINE

## 2021-02-16 RX ORDER — ALBUTEROL SULFATE 90 UG/1
2 AEROSOL, METERED RESPIRATORY (INHALATION) EVERY 6 HOURS PRN
Qty: 1 INHALER | Refills: 1 | Status: SHIPPED | OUTPATIENT
Start: 2021-02-16 | End: 2021-05-19 | Stop reason: ALTCHOICE

## 2021-02-16 RX ORDER — BENZONATATE 100 MG/1
100 CAPSULE ORAL 3 TIMES DAILY PRN
Qty: 21 CAPSULE | Refills: 0 | Status: SHIPPED | OUTPATIENT
Start: 2021-02-16 | End: 2021-02-23

## 2021-02-16 RX ORDER — AZITHROMYCIN 250 MG/1
TABLET, FILM COATED ORAL
Qty: 6 TABLET | Refills: 0 | Status: SHIPPED | OUTPATIENT
Start: 2021-02-16 | End: 2021-02-21

## 2021-02-16 RX ORDER — BENZONATATE 100 MG/1
100 CAPSULE ORAL 3 TIMES DAILY PRN
Qty: 21 CAPSULE | Refills: 0 | Status: SHIPPED | OUTPATIENT
Start: 2021-02-16 | End: 2021-02-16 | Stop reason: SDUPTHER

## 2021-02-16 RX ORDER — ALBUTEROL SULFATE 90 UG/1
2 AEROSOL, METERED RESPIRATORY (INHALATION) EVERY 6 HOURS PRN
Qty: 1 INHALER | Refills: 1 | Status: SHIPPED | OUTPATIENT
Start: 2021-02-16 | End: 2021-02-16 | Stop reason: SDUPTHER

## 2021-02-16 RX ORDER — AZITHROMYCIN 250 MG/1
TABLET, FILM COATED ORAL
Qty: 6 TABLET | Refills: 0 | Status: SHIPPED | OUTPATIENT
Start: 2021-02-16 | End: 2021-02-16 | Stop reason: SDUPTHER

## 2021-02-16 ASSESSMENT — LIFESTYLE VARIABLES
SMOKING_STATUS: YES
SMOKING_YEARS: 15

## 2021-02-16 NOTE — PROGRESS NOTES
HPI: per patient's questionnaire    EXAM: not applicable    Diagnoses and all orders for this visit:    1. Acute bronchitis due to infection  Worsening  - benzonatate (TESSALON PERLES) 100 MG capsule; Take 1 capsule by mouth 3 times daily as needed for Cough  Dispense: 21 capsule; Refill: 0  - azithromycin (ZITHROMAX) 250 MG tablet; 500 mg orally on day one followed by 250 mg daily on days two through five  Dispense: 6 tablet; Refill: 0  Albuterol inhaler 2 puffs every 6 hours as needed for cough, shortness of breath or wheezing    Patient was advised to contact PCP if symptoms worsen or failing to change as expected    11-20 minutes were spent on the digital evaluation and management of this patient.

## 2021-02-22 DIAGNOSIS — J30.89 SEASONAL ALLERGIC RHINITIS DUE TO OTHER ALLERGIC TRIGGER: ICD-10-CM

## 2021-02-22 RX ORDER — CETIRIZINE HYDROCHLORIDE 10 MG/1
10 TABLET ORAL DAILY
Qty: 90 TABLET | Refills: 3 | Status: SHIPPED | OUTPATIENT
Start: 2021-02-22 | End: 2022-03-08

## 2021-02-23 ENCOUNTER — TELEPHONE (OUTPATIENT)
Dept: FAMILY MEDICINE CLINIC | Age: 31
End: 2021-02-23

## 2021-02-23 DIAGNOSIS — G44.209 ACUTE NON INTRACTABLE TENSION-TYPE HEADACHE: Primary | ICD-10-CM

## 2021-02-23 RX ORDER — BUTALBITAL, ACETAMINOPHEN AND CAFFEINE 50; 325; 40 MG/1; MG/1; MG/1
1 TABLET ORAL EVERY 6 HOURS PRN
Qty: 30 TABLET | Refills: 0 | Status: SHIPPED | OUTPATIENT
Start: 2021-02-23 | End: 2021-10-12 | Stop reason: SDUPTHER

## 2021-02-23 NOTE — TELEPHONE ENCOUNTER
Patient states she was diagnosed with Covid last week and has a headache. Patient requests for you to call something in for the headache. Please advise.

## 2021-05-19 ENCOUNTER — OFFICE VISIT (OUTPATIENT)
Dept: FAMILY MEDICINE CLINIC | Age: 31
End: 2021-05-19
Payer: MEDICARE

## 2021-05-19 ENCOUNTER — TELEPHONE (OUTPATIENT)
Dept: FAMILY MEDICINE CLINIC | Age: 31
End: 2021-05-19

## 2021-05-19 VITALS
OXYGEN SATURATION: 97 % | HEIGHT: 65 IN | BODY MASS INDEX: 31.32 KG/M2 | DIASTOLIC BLOOD PRESSURE: 86 MMHG | WEIGHT: 188 LBS | SYSTOLIC BLOOD PRESSURE: 130 MMHG | HEART RATE: 89 BPM | TEMPERATURE: 98.4 F

## 2021-05-19 DIAGNOSIS — R73.9 HYPERGLYCEMIA: ICD-10-CM

## 2021-05-19 DIAGNOSIS — F41.9 ANXIETY: ICD-10-CM

## 2021-05-19 DIAGNOSIS — F31.81 BIPOLAR II DISORDER (HCC): Primary | ICD-10-CM

## 2021-05-19 DIAGNOSIS — Z13.6 SCREENING FOR CARDIOVASCULAR CONDITION: ICD-10-CM

## 2021-05-19 LAB — HBA1C MFR BLD: 5.2 %

## 2021-05-19 PROCEDURE — 4004F PT TOBACCO SCREEN RCVD TLK: CPT | Performed by: FAMILY MEDICINE

## 2021-05-19 PROCEDURE — 83036 HEMOGLOBIN GLYCOSYLATED A1C: CPT | Performed by: FAMILY MEDICINE

## 2021-05-19 PROCEDURE — G8417 CALC BMI ABV UP PARAM F/U: HCPCS | Performed by: FAMILY MEDICINE

## 2021-05-19 PROCEDURE — G8427 DOCREV CUR MEDS BY ELIG CLIN: HCPCS | Performed by: FAMILY MEDICINE

## 2021-05-19 PROCEDURE — 99214 OFFICE O/P EST MOD 30 MIN: CPT | Performed by: FAMILY MEDICINE

## 2021-05-19 RX ORDER — BUSPIRONE HYDROCHLORIDE 10 MG/1
10 TABLET ORAL 3 TIMES DAILY PRN
Qty: 90 TABLET | Refills: 0 | Status: SHIPPED | OUTPATIENT
Start: 2021-05-19 | End: 2021-08-12 | Stop reason: SDUPTHER

## 2021-05-19 RX ORDER — DESVENLAFAXINE 50 MG/1
50 TABLET, EXTENDED RELEASE ORAL DAILY
Qty: 30 TABLET | Refills: 3 | Status: SHIPPED | OUTPATIENT
Start: 2021-05-19 | End: 2021-08-11

## 2021-05-19 ASSESSMENT — ANXIETY QUESTIONNAIRES
2. NOT BEING ABLE TO STOP OR CONTROL WORRYING: 1-SEVERAL DAYS
1. FEELING NERVOUS, ANXIOUS, OR ON EDGE: 2-OVER HALF THE DAYS
5. BEING SO RESTLESS THAT IT IS HARD TO SIT STILL: 1-SEVERAL DAYS
4. TROUBLE RELAXING: 3-NEARLY EVERY DAY
3. WORRYING TOO MUCH ABOUT DIFFERENT THINGS: 3-NEARLY EVERY DAY
6. BECOMING EASILY ANNOYED OR IRRITABLE: 3-NEARLY EVERY DAY

## 2021-05-19 ASSESSMENT — PATIENT HEALTH QUESTIONNAIRE - PHQ9
SUM OF ALL RESPONSES TO PHQ QUESTIONS 1-9: 2
2. FEELING DOWN, DEPRESSED OR HOPELESS: 1
SUM OF ALL RESPONSES TO PHQ QUESTIONS 1-9: 2

## 2021-05-19 ASSESSMENT — SOCIAL DETERMINANTS OF HEALTH (SDOH): HOW HARD IS IT FOR YOU TO PAY FOR THE VERY BASICS LIKE FOOD, HOUSING, MEDICAL CARE, AND HEATING?: NOT HARD AT ALL

## 2021-05-19 NOTE — PROGRESS NOTES
Natasha Haywood (:  1990) is a 31 y.o. female,Established patient, here for evaluation of the following chief complaint(s): Anxiety (thinks its anxiety) and Depression      ASSESSMENT/PLAN:    1. Bipolar II disorder ([facility] [address]  worsening    -start     desvenlafaxine succinate (PRISTIQ) 50 MG TB24 extended release tablet; Take 1 tablet by mouth daily STOP LEXAPRO 5 MG**, Disp-30 tablet, R-3Normal  -  start   busPIRone (BUSPAR) 10 MG tablet; Take 1 tablet by mouth 3 times daily as needed (anxiety), Disp-90 tablet, R-0Normal  -     [address]  -     TSH without Reflex; Future  If not helping, will change to Lexapro 10 mg, she will let me know    2. Anxiety  worsening    - start    desvenlafaxine succinate (PRISTIQ) 50 MG TB24 extended release tablet; Take 1 tablet by mouth daily STOP LEXAPRO 5 MG**, Disp-30 tablet, R-3Normal  -  start   busPIRone (BUSPAR) 10 MG tablet; Take 1 tablet by mouth 3 times daily as needed (anxiety), Disp-90 tablet, R-0Normal  -     [address]  -     TSH without Reflex; Future  3. Hyperglycemia  -     POCT glycosylated hemoglobin (Hb A1C) 5.2 , within normal limits   Low carb diet  Lab Results   Component Value Date    LABA1C 5.2 2021    LABA1C 5.4 2019    LABA1C 5.4 10/30/2018       4. Screening for cardiovascular condition  -     Lipid Panel; Future        Natasha received counseling on the following healthy behaviors: nutrition, exercise, medication adherence and weight loss    Reviewed prior labs and health maintenance  Discussed use, benefit, and side effects of prescribed medications. Barriers to medication compliance addressed. Patient given educational materials - see patient instructions  All patient questions answered. Patient voiced understanding. The patient's past medical,surgical, social, and family history as well as her current medications and allergies were reviewed as documented in today's encounter.     Medications, labs, have you been bothered by any of the following problems? Little interest or pleasure in doing things: Several days  Feeling down, depressed, or hopeless: Several days  PHQ-2 Score: 2  PHQ-9 Over the past 2 weeks, how often have you been bothered by any of the following problems? PHQ-9 Total Score: 2    Denies suicidal ideation, plan or intent. PHQ Scores 5/19/2021 9/24/2020 8/26/2020 9/24/2019 8/20/2019 3/19/2019 3/5/2019   PHQ2 Score 2 0 0 0 1 0 1   PHQ9 Score 2 0 0 0 6 0 6         Prior hyperglycemia, blood glucose 128 on 1/8/2021, 102 on 12/25/2020, 100 on 5/29/2019  A1c is normal  Patient says she did cut down the pop and sweets  Denies increased appetite, thirst or polyuria. Lab Results   Component Value Date    LABA1C 5.2 05/19/2021    LABA1C 5.4 11/12/2019    LABA1C 5.4 10/30/2018   Weight has been fluctuating, however overall she has lost 12 pounds in 1.5 years  Wt Readings from Last 3 Encounters:   05/19/21 188 lb (85.3 kg)   12/25/20 178 lb (80.7 kg)   11/30/20 185 lb (83.9 kg)     On 11/12/2019  Wt 200 lb (90.7 kg)    Due for lipids screening. Due to BMI, Ingrid Goodson is due for lipids screening. Ingrid Goodson is not eating low fat diet. she is not exercising, but very active. she is not taking any over the counter supplements. Social History     Tobacco Use    Smoking status: Current Every Day Smoker     Packs/day: 0.50     Years: 9.00     Pack years: 4.50     Types: Cigarettes    Smokeless tobacco: Never Used   Vaping Use    Vaping Use: Never assessed   Substance Use Topics    Alcohol use: Yes     Alcohol/week: 0.0 standard drinks     Comment: occasionally    Drug use: No       Review of Systems   Constitutional: Positive for fatigue. Negative for activity change, appetite change, chills, diaphoresis, fever and unexpected weight change. Respiratory: Positive for shortness of breath (during anxiety attacks only). Negative for cough, chest tightness and wheezing.     Cardiovascular: Positive for chest pain (none now, only during anxiety attacks). Negative for palpitations and leg swelling. Gastrointestinal: Negative for abdominal distention, abdominal pain, constipation, diarrhea, nausea and vomiting. Endocrine: Negative for cold intolerance, heat intolerance, polydipsia, polyphagia and polyuria. Psychiatric/Behavioral: Positive for decreased concentration, dysphoric mood and sleep disturbance (works second shift). Negative for self-injury and suicidal ideas. The patient is nervous/anxious.          -vital signs stable and within normal limits except Obesity per BMI. /86   Pulse 89   Temp 98.4 °F (36.9 °C) (Temporal)   Ht 5' 5\" (1.651 m)   Wt 188 lb (85.3 kg)   LMP 06/21/2013   SpO2 97%   BMI 31.28 kg/m²        Physical Exam  Vitals and nursing note reviewed. Constitutional:       General: She is not in acute distress. Appearance: Normal appearance. She is well-developed. She is obese. She is not diaphoretic. HENT:      Head: Normocephalic and atraumatic. Right Ear: External ear normal.      Left Ear: External ear normal.      Nose:      Comments: I did not examine the mouth due to coronavirus pandemic and wearing masks . Eyes:      General: Lids are normal. No scleral icterus. Right eye: No discharge. Left eye: No discharge. Extraocular Movements: Extraocular movements intact. Conjunctiva/sclera: Conjunctivae normal.   Neck:      Thyroid: No thyromegaly. Cardiovascular:      Rate and Rhythm: Normal rate and regular rhythm. Heart sounds: Normal heart sounds. No murmur heard. Pulmonary:      Effort: Pulmonary effort is normal. No respiratory distress. Breath sounds: Normal breath sounds. No wheezing or rales. Chest:      Chest wall: No tenderness. Abdominal:      General: Bowel sounds are normal. There is no distension. Palpations: Abdomen is soft. There is no hepatomegaly or splenomegaly.       Tenderness: There is no abdominal tenderness. Comments: Obese abdomen. Musculoskeletal:         General: No tenderness. Normal range of motion. Cervical back: Normal range of motion and neck supple. Right lower leg: No edema. Left lower leg: No edema. Skin:     General: Skin is warm and dry. Capillary Refill: Capillary refill takes less than 2 seconds. Findings: No rash. Neurological:      Mental Status: She is alert and oriented to person, place, and time. Cranial Nerves: No cranial nerve deficit. Motor: No abnormal muscle tone. Psychiatric:         Mood and Affect: Mood is anxious. Behavior: Behavior normal.         Thought Content: Thought content normal.         Judgment: Judgment normal.             I personally reviewed testing with patient.   Hyperglycemia    Otherwise labs within normal limits    Lab Results   Component Value Date    WBC 10.6 01/08/2021    HGB 13.1 01/08/2021    HCT 37.3 01/08/2021    MCV 93.4 01/08/2021     01/08/2021       Lab Results   Component Value Date     01/08/2021    K 4.2 01/08/2021     01/08/2021    CO2 26 01/08/2021    BUN 12 01/08/2021    CREATININE 0.67 01/08/2021    GLUCOSE 128 01/08/2021    CALCIUM 9.4 01/08/2021        Lab Results   Component Value Date    ALT 25 01/08/2021    AST 20 01/08/2021    ALKPHOS 78 01/08/2021    BILITOT 0.37 01/08/2021       Lab Results   Component Value Date    TSH 1.30 01/18/2019       No results found for: CHOL  No results found for: TRIG  No results found for: HDL  No results found for: LDLCALC, LDLCHOLESTEROL  No results found for: [facility]    Lab Results   Component Value Date    LABA1C 5.2 05/19/2021         Orders Placed This Encounter   Procedures    TSH without Reflex     Standing Status:   Future     Standing Expiration Date:   7/9/2021    Lipid Panel     Standing Status:   Future     Standing Expiration Date:   7/9/2021     Order Specific Question:   Is Patient Fasting?/# of Hours     Answer:   8-10 Hours, water ok to drink   [facility]     Referral Priority:   Routine     Referral Type:   Eval and Treat     Referral Reason:   Specialty Services Required     Requested Specialty:   Psychology     Number of Visits Requested:   1    POCT glycosylated hemoglobin (Hb A1C)       Orders Placed This Encounter   Medications    desvenlafaxine succinate (PRISTIQ) 50 MG TB24 extended release tablet     Sig: Take 1 tablet by mouth daily STOP LEXAPRO 5 MG**     Dispense:  30 tablet     Refill:  3    busPIRone (BUSPAR) 10 MG tablet     Sig: Take 1 tablet by mouth 3 times daily as needed (anxiety)     Dispense:  90 tablet     Refill:  0       Medications Discontinued During This Encounter   Medication Reason    escitalopram (LEXAPRO) 5 MG tablet Alternate therapy    ranitidine (ZANTAC) 150 MG capsule Therapy completed    albuterol sulfate HFA (PROVENTIL HFA) 108 (90 Base) MCG/ACT inhaler Therapy completed    sucralfate (CARAFATE) 1 GM tablet Therapy completed    famotidine (PEPCID) 20 MG tablet Therapy completed    diclofenac sodium (VOLTAREN) 1 % GEL Therapy completed    Dextromethorphan-guaiFENesin  MG/5ML SYRP Therapy completed    pantoprazole (PROTONIX) 40 MG tablet Alternate therapy    vitamin B-2 (RIBOFLAVIN) 100 MG TABS tablet Cost of medication    CHANTIX CONTINUING MONTH TOMÁS 1 MG tablet Patient Choice    loratadine-pseudoephedrine (CLARITIN-D 12 HOUR) 5-120 MG per extended release tablet Therapy completed    mometasone (NASONEX) 50 MCG/ACT nasal spray Alternate therapy    PROVENTIL  (90 Base) MCG/ACT inhaler Therapy completed    sodium chloride (ALTAMIST SPRAY) 0.65 % nasal spray Therapy completed           On this date 5/19/2021 I have spent  35 minutes reviewing previous notes, test results and face to face with the patient discussing the diagnosis and importance of compliance with the treatment plan as well as documenting on the day of the visit. This note was completed by using the assistance of a speech-recognition program. However, inadvertent computerized transcription errors may be present. Although every effort was made to ensure accuracy, no guarantees can be provided that every mistake has been identified and corrected by editing. An electronic signature was used to authenticate this note.   Electronically signed by Linus Gale MD on 5/24/2021 at 1:52 PM

## 2021-05-19 NOTE — PROGRESS NOTES
Visit Information    Have you changed or started any medications since your last visit including any over-the-counter medicines, vitamins, or herbal medicines? no   Are you having any side effects from any of your medications? -  no  Have you stopped taking any of your medications? Is so, why? -  no    Have you seen any other physician or provider since your last visit? No  Have you had any other diagnostic tests since your last visit? No  Have you been seen in the emergency room and/or had an admission to a hospital since we last saw you? No  Have you had your routine dental cleaning in the past 6 months? no    Have you activated your [employer] account? If not, what are your barriers?  Yes     Patient Care Team:  Dareen Goltz, MD as PCP - General  Dareen Goltz, MD as PCP [facility]  Mia Esparza DO as Consulting Physician (Obstetrics & Gynecology)  Hany Chong MD as Consulting Physician (Urology)  Osvaldo Cherry MD as Consulting Physician (Gastroenterology)    Medical History Review  Past Medical, Family, and Social History reviewed and does contribute to the patient presenting condition    Health Maintenance   Topic Date Due    A1C test (Diabetic or Prediabetic)  11/12/2020    Flu vaccine (Season Ended) 09/01/2021    DTaP/Tdap/Td vaccine (2 - Td) 03/19/2029    Pneumococcal 0-64 years Vaccine (2 of 2) 10/19/2054    COVID-19 Vaccine  Completed    Hepatitis C screen  Completed    HIV screen  Completed    Hepatitis A vaccine  Aged Out    Hepatitis B vaccine  Aged Out    Hib vaccine  Aged Out    Meningococcal (ACWY) vaccine  Aged Out    Varicella vaccine  Discontinued

## 2021-05-24 ENCOUNTER — TELEPHONE (OUTPATIENT)
Dept: FAMILY MEDICINE CLINIC | Age: 31
End: 2021-05-24

## 2021-05-24 ASSESSMENT — ENCOUNTER SYMPTOMS
ABDOMINAL PAIN: 0
COUGH: 0
NAUSEA: 0
CONSTIPATION: 0
WHEEZING: 0
CHEST TIGHTNESS: 0
DIARRHEA: 0
ABDOMINAL DISTENTION: 0
SHORTNESS OF BREATH: 1
VOMITING: 0

## 2021-05-24 NOTE — TELEPHONE ENCOUNTER
Please  Do prior authorization for Pristiq see prior telephone encounter which was closed too soon before being addressed

## 2021-07-31 ENCOUNTER — E-VISIT (OUTPATIENT)
Dept: FAMILY MEDICINE CLINIC | Age: 31
End: 2021-07-31
Payer: MEDICARE

## 2021-07-31 DIAGNOSIS — N30.00 ACUTE CYSTITIS WITHOUT HEMATURIA: Primary | ICD-10-CM

## 2021-07-31 PROCEDURE — 99422 OL DIG E/M SVC 11-20 MIN: CPT | Performed by: FAMILY MEDICINE

## 2021-07-31 RX ORDER — NITROFURANTOIN 25; 75 MG/1; MG/1
100 CAPSULE ORAL 2 TIMES DAILY
Qty: 10 CAPSULE | Refills: 0 | Status: SHIPPED | OUTPATIENT
Start: 2021-07-31 | End: 2021-08-11 | Stop reason: ALTCHOICE

## 2021-07-31 RX ORDER — FLUCONAZOLE 150 MG/1
150 TABLET ORAL ONCE
Qty: 1 TABLET | Refills: 0 | Status: SHIPPED | OUTPATIENT
Start: 2021-07-31 | End: 2021-07-31

## 2021-07-31 NOTE — PROGRESS NOTES
HPI: per patient's questionnaire    EXAM: not applicable    Diagnoses and all orders for this visit:    1. Acute cystitis without hematuria  worsening    - nitrofurantoin, macrocrystal-monohydrate, (MACROBID) 100 MG capsule; Take 1 capsule by mouth 2 times daily  Dispense: 10 capsule; Refill: 0  - fluconazole (DIFLUCAN) 150 MG tablet; Take 1 tablet by mouth once for 1 dose  Dispense: 1 tablet; Refill: 0  - Urinalysis Reflex to Culture; Future      Orders Placed This Encounter   Procedures    Urinalysis Reflex to Culture     Standing Status:   Future     Standing Expiration Date:   7/31/2022     Order Specific Question:   SPECIFY(EX-CATH,MIDSTREAM,CYSTO,ETC)? Answer:   midstream         Patient was advised to contact PCP if symptoms worsen or failing to change as expected        11-20 minutes were spent on the digital evaluation and management of this patient.

## 2021-08-11 ASSESSMENT — PATIENT HEALTH QUESTIONNAIRE - PHQ9
1. LITTLE INTEREST OR PLEASURE IN DOING THINGS: 0
2. FEELING DOWN, DEPRESSED OR HOPELESS: 0
SUM OF ALL RESPONSES TO PHQ QUESTIONS 1-9: 0
SUM OF ALL RESPONSES TO PHQ QUESTIONS 1-9: 0
SUM OF ALL RESPONSES TO PHQ9 QUESTIONS 1 & 2: 0
SUM OF ALL RESPONSES TO PHQ QUESTIONS 1-9: 0

## 2021-08-12 ENCOUNTER — TELEMEDICINE (OUTPATIENT)
Dept: FAMILY MEDICINE CLINIC | Age: 31
End: 2021-08-12
Payer: MEDICARE

## 2021-08-12 DIAGNOSIS — F31.81 BIPOLAR II DISORDER (HCC): ICD-10-CM

## 2021-08-12 DIAGNOSIS — F41.9 ANXIETY: ICD-10-CM

## 2021-08-12 PROCEDURE — G8427 DOCREV CUR MEDS BY ELIG CLIN: HCPCS | Performed by: FAMILY MEDICINE

## 2021-08-12 PROCEDURE — 99213 OFFICE O/P EST LOW 20 MIN: CPT | Performed by: FAMILY MEDICINE

## 2021-08-12 RX ORDER — BUSPIRONE HYDROCHLORIDE 10 MG/1
10 TABLET ORAL 2 TIMES DAILY
Qty: 60 TABLET | Refills: 3 | Status: SHIPPED | OUTPATIENT
Start: 2021-08-12 | End: 2021-11-15

## 2021-08-12 ASSESSMENT — ENCOUNTER SYMPTOMS
CONSTIPATION: 0
WHEEZING: 0
NAUSEA: 0
ABDOMINAL DISTENTION: 0
SHORTNESS OF BREATH: 0
DIARRHEA: 0
CHEST TIGHTNESS: 0
COUGH: 0
ABDOMINAL PAIN: 0
VOMITING: 0

## 2021-08-12 ASSESSMENT — ANXIETY QUESTIONNAIRES
7. FEELING AFRAID AS IF SOMETHING AWFUL MIGHT HAPPEN: 0-NOT AT ALL
5. BEING SO RESTLESS THAT IT IS HARD TO SIT STILL: 1-SEVERAL DAYS
3. WORRYING TOO MUCH ABOUT DIFFERENT THINGS: 3-NEARLY EVERY DAY
1. FEELING NERVOUS, ANXIOUS, OR ON EDGE: 0-NOT AT ALL
GAD7 TOTAL SCORE: 5
2. NOT BEING ABLE TO STOP OR CONTROL WORRYING: 0-NOT AT ALL
6. BECOMING EASILY ANNOYED OR IRRITABLE: 0-NOT AT ALL
4. TROUBLE RELAXING: 1-SEVERAL DAYS

## 2021-08-12 NOTE — PROGRESS NOTES
2021    TELEHEALTH EVALUATION -- Audio/Visual (During RCUFU-35 public health emergency)      David Haywood (:  1990) is a 31 y.o. female,Established patient, here for evaluation of the following chief complaint(s): Depression and Anxiety        ASSESSMENT/PLAN:    1. Bipolar II disorder (HCC)  Improved  -     busPIRone (BUSPAR) 10 MG tablet; Take 1 tablet by mouth 2 times daily, Disp-60 tablet, R-3Normal  2. Anxiety  Improved  -     busPIRone (BUSPAR) 10 MG tablet; Take 1 tablet by mouth 2 times daily, Disp-60 tablet, R-3Normal--advised to start taking the BuSpar around-the-clock      Letter for work given, she would benefit from not getting mandated in order to continue to improve her anxiety. 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 This also can make her anxiety worse and can affect the safety of her family. \"        Natasha received counseling on the following healthy behaviors: nutrition, exercise, medication adherence and tobacco cessation  Reviewed prior labs and health maintenance  Discussed use, benefit, and side effects of prescribed medications. Barriers to medication compliance addressed. Patient given educational materials - see patient instructions  All patient questions answered. Patient voiced understanding. The patient's past medical,surgical, social, and family history as well as her current medications and allergies were reviewed as documented in today's encounter. Medications, labs, diagnostic studies, consultations and follow-up as documented in this encounter. Return in about 3 months (around 2021) for KEEP APPT.     Patient will come to  the letter    Future Appointments   Date Time Provider Cecelia Garner   2021  2:45 PM Jorge Barajas MD fp [facility]         SUBJECTIVE/OBJECTIVE:  Natasha Haywood (:  1990) has requested an audio/video evaluation for the following concern(s):Depression and Anxiety    Patient was recently started on BuSpar and Pristiq for anxiety and bipolar disorder    Anxiety   Patient reports her anxiety is better with medication which she has been taking as needed. Patient reports she feels overwhelmed, especially with work as they have shortage of staff, and she gets mandated 16 hrs shifts which makes anxiety worse  Her usual work shifts are 8 hrs    Patient says when she gets mandated she gets worried for her children and especially her down syndrome child, \"I have nobody to take care of him\"  She says she did tell at work that she has a nonverbal child at home with Down sdr, Master, 15 yo. He  will go to [employer] in fall, to a special needs school  She is very proud that he is a straight A student for his level. All kids are going to school soon as well. GABBY 7 improved    GABBY-7 SCREENING 8/12/2021 5/19/2021 8/20/2019 3/5/2019   Feeling nervous, anxious, or on edge 0-Not at all 2-Over half the days 0-Not at all sure 2-Over half the days   Not able to stop or control worrying 0-Not at all 1-Several days 1-Several days 2-Over half the days   Worrying too much about different things 3-Nearly every day 3-Nearly every day 1-Several days 2-Over half the days   Trouble relaxing 1-Several days 3-Nearly every day 1-Several days 2-Over half the days   Being so restless that it's hard to sit still 1-Several days 1-Several days 1-Several days 2-Over half the days   Becoming easily annoyed or irritable 0-Not at all 3-Nearly every day 1-Several days 2-Over half the days   Feeling afraid as if something awful might happen 0-Not at all 0-Not at all 0-Not at all sure 0-Not at all sure   GABBY-7 Total Score 5 13 5 12        Depression improved  She says Pristiq was not covered    PHQ-2 Over the past 2 weeks, how often have you been bothered by any of the following problems?   Little interest or pleasure in doing things: Not at all  Feeling down, depressed, or hopeless: Not at all  PHQ-2 Score: 0  PHQ-9 Over the past 2 weeks, how often have you been bothered by any of the following problems? PHQ-9 Total Score: 0        PHQ Scores 8/11/2021 5/19/2021 9/24/2020 8/26/2020 9/24/2019 8/20/2019 3/19/2019   PHQ2 Score 0 2 0 0 0 1 0   PHQ9 Score 0 2 0 0 0 6 0     Review of Systems   Constitutional: Negative for activity change, appetite change, chills, diaphoresis, fatigue, fever and unexpected weight change. Respiratory: Negative for cough, chest tightness, shortness of breath and wheezing. Cardiovascular: Negative for chest pain, palpitations and leg swelling. Gastrointestinal: Negative for abdominal distention, abdominal pain, constipation, diarrhea, nausea and vomiting. Psychiatric/Behavioral: Negative for dysphoric mood, self-injury, sleep disturbance and suicidal ideas. The patient is nervous/anxious. Prior to Visit Medications    Medication Sig Taking?  Authorizing Provider   busPIRone (BUSPAR) 10 MG tablet Take 1 tablet by mouth 2 times daily Yes Dale Gallardo MD   butalbital-acetaminophen-caffeine (FIORICET, ESGIC) -40 MG per tablet Take 1 tablet by mouth every 6 hours as needed for Headaches or Migraine MAX 3 DAYS/WEEK Yes Dale Gallardo MD   cetirizine (ZYRTEC) 10 MG tablet TAKE 1 TABLET BY MOUTH DAILY Yes Dale Gallardo MD   omeprazole (PRILOSEC) 40 MG delayed release capsule Take 1 capsule by mouth every morning (before breakfast) Yes Dale Gallardo MD   vitamin D (CHOLECALCIFEROL) 50 MCG (2000 UT) TABS tablet TAKE 1 TABLET BY MOUTH DAILY Yes Dale Gallardo MD   Masks (CLEVER CHOICE FACE MASK) [address] [facility] [address] [place] DUE TO COVID PANDEMIC Yes Dale Gallardo MD   Elastic Bandages & Supports (WRIST SPLINT/NEOPRENE RIGHT RICHARD) MISC Needs right wrist splint Yes Dale Gallardo MD   MAPAP 500 MG tablet TAKE 1 TABLET BY MOUTH EVERY 6 HOURS AS NEEDED FOR PAIN Yes Arlin Gastelum MD       Social History     Tobacco Use    Smoking status: Current Every Day Smoker     Packs/day: 0.50     Years: 9.00     Pack years: 4.50     Types: Cigarettes    Smokeless tobacco: Never Used   Vaping Use    Vaping Use: Never assessed   Substance Use Topics    Alcohol use: Yes     Alcohol/week: 0.0 standard drinks     Comment: occasionally    Drug use: No          PHYSICAL EXAMINATION:    Vital Signs: (As obtained by patient/caregiver or practitioner observation)  -vital signs stable and within normal limits except obesity per BMI  Patient-Reported Vitals 8/11/2021   Patient-Reported Weight 188 lb   Patient-Reported Height 5'5   Patient-Reported Systolic -   Patient-Reported Diastolic -   Patient-Reported Pulse -   Patient-Reported Temperature -   Patient-Reported SpO2 -         Constitutional: [x] Appears well-developed and well-nourished [x] No apparent distress      [x] Abnormal-obese    Mental status  [x] Alert and awake  [x] Oriented to person/place/time [x]Able to follow commands      Eyes:  EOM    [x]  Normal  [] Abnormal-  Sclera  [x]  Normal  [] Abnormal -         Discharge [x]  None visible  [] Abnormal -    HENT:   [x] Normocephalic, atraumatic.   [] Abnormal   [x] Mouth/Throat: Mucous membranes are moist.     External Ears [x] Normal  [] Abnormal-     Neck: [x] No visualized mass     Pulmonary/Chest: [x] Respiratory effort normal.  [x] No visualized signs of difficulty breathing or respiratory distress        [] Abnormal        Musculoskeletal:   [x] Normal gait with no signs of ataxia         [x] Normal range of motion of neck        [] Abnormal-       Neurological:        [x] No Facial Asymmetry (Cranial nerve 7 motor function) (limited exam to video visit)          [x] No gaze palsy        [] Abnormal-            Skin:        [x] No significant exanthematous lesions or discoloration noted on facial skin         [] Abnormal- Psychiatric:      [x] No Hallucinations       []Mood is normal      [x]Behavior is normal      [x]Judgment is normal      [x]Thought content is normal       [x] Abnormal-anxious    Other pertinent observable physical exam findings- none    Due to this being a TeleHealth encounter, evaluation of the following organ systems is limited: Vitals/Constitutional/EENT/Resp/CV/GI//MS/Neuro/Skin/Heme-Lymph-Imm. I personally reviewed most recent labs reviewed with the patient and all questions fully answered. Hyperglycemia  A1c normal  Otherwise labs within normal limits        Lab Results   Component Value Date    WBC 10.6 01/08/2021    HGB 13.1 01/08/2021    HCT 37.3 01/08/2021    MCV 93.4 01/08/2021     01/08/2021       Lab Results   Component Value Date     01/08/2021    K 4.2 01/08/2021     01/08/2021    CO2 26 01/08/2021    BUN 12 01/08/2021    CREATININE 0.67 01/08/2021    GLUCOSE 128 01/08/2021    CALCIUM 9.4 01/08/2021        Lab Results   Component Value Date    ALT 25 01/08/2021    AST 20 01/08/2021    ALKPHOS 78 01/08/2021    BILITOT 0.37 01/08/2021       Lab Results   Component Value Date    TSH 1.30 01/18/2019       Lab Results   Component Value Date    LABA1C 5.2 05/19/2021    LABA1C 5.4 11/12/2019    LABA1C 5.4 10/30/2018         Orders Placed This Encounter   Medications    busPIRone (BUSPAR) 10 MG tablet     Sig: Take 1 tablet by mouth 2 times daily     Dispense:  60 tablet     Refill:  3       No orders of the defined types were placed in this encounter.       Medications Discontinued During This Encounter   Medication Reason    desvenlafaxine succinate (PRISTIQ) 50 MG TB24 extended release tablet Patient Choice    nitrofurantoin, macrocrystal-monohydrate, (MACROBID) 100 MG capsule Therapy completed    fluticasone (FLONASE) 50 MCG/ACT nasal spray LIST CLEANUP    busPIRone (BUSPAR) 10 MG tablet REORDER              Natasha Haywood, was evaluated through a synchronous (real-time) audio-video encounter. The patient (or guardian if applicable) is aware that this is a billable service. Verbal consent to proceed has been obtained within the past 12 months. The visit was conducted pursuant to the emergency declaration under the [facility], [address] [place] [address] and the [employer] Act. Patient identification was verified    Patient was alone   The patient was located in a state where the provider was credentialed to provide care. On this date 2021 I have spent 29 minutes reviewing previous notes, test results and face to face with the patient discussing the diagnosis and importance of compliance with the treatment plan as well as documenting on the day of the visit. --Lynann Apgar, MD on 2021 at 10:38 PM    An electronic signature was used to authenticate this note.

## 2021-10-12 DIAGNOSIS — G44.209 ACUTE NON INTRACTABLE TENSION-TYPE HEADACHE: ICD-10-CM

## 2021-10-12 RX ORDER — BUTALBITAL, ACETAMINOPHEN AND CAFFEINE 50; 325; 40 MG/1; MG/1; MG/1
1 TABLET ORAL EVERY 6 HOURS PRN
Qty: 30 TABLET | Refills: 0 | Status: SHIPPED | OUTPATIENT
Start: 2021-10-12 | End: 2022-06-23 | Stop reason: SDUPTHER

## 2021-10-18 DIAGNOSIS — K21.9 GASTROESOPHAGEAL REFLUX DISEASE WITHOUT ESOPHAGITIS: Primary | ICD-10-CM

## 2021-10-18 DIAGNOSIS — K29.00 OTHER ACUTE GASTRITIS WITHOUT HEMORRHAGE: ICD-10-CM

## 2021-10-18 RX ORDER — OMEPRAZOLE 40 MG/1
40 CAPSULE, DELAYED RELEASE ORAL DAILY
Qty: 90 CAPSULE | Refills: 0 | Status: SHIPPED | OUTPATIENT
Start: 2021-10-18 | End: 2022-01-10

## 2021-10-27 ENCOUNTER — E-VISIT (OUTPATIENT)
Dept: FAMILY MEDICINE CLINIC | Age: 31
End: 2021-10-27
Payer: MEDICARE

## 2021-10-27 DIAGNOSIS — J01.90 ACUTE BACTERIAL SINUSITIS: Primary | ICD-10-CM

## 2021-10-27 DIAGNOSIS — B96.89 ACUTE BACTERIAL SINUSITIS: Primary | ICD-10-CM

## 2021-10-27 PROCEDURE — 99423 OL DIG E/M SVC 21+ MIN: CPT | Performed by: FAMILY MEDICINE

## 2021-10-27 RX ORDER — AZITHROMYCIN 250 MG/1
TABLET, FILM COATED ORAL
Qty: 6 TABLET | Refills: 0 | Status: SHIPPED | OUTPATIENT
Start: 2021-10-27 | End: 2021-11-01

## 2021-10-27 RX ORDER — GUAIFENESIN 600 MG/1
600 TABLET, EXTENDED RELEASE ORAL 2 TIMES DAILY
Qty: 30 TABLET | Refills: 0 | Status: SHIPPED | OUTPATIENT
Start: 2021-10-27 | End: 2022-01-06 | Stop reason: SDUPTHER

## 2021-10-27 RX ORDER — CETIRIZINE HYDROCHLORIDE 10 MG/1
10 TABLET ORAL DAILY
Qty: 60 TABLET | Refills: 0 | Status: SHIPPED | OUTPATIENT
Start: 2021-10-27 | End: 2022-03-08 | Stop reason: SDUPTHER

## 2021-10-27 RX ORDER — FLUTICASONE PROPIONATE 50 MCG
2 SPRAY, SUSPENSION (ML) NASAL DAILY
Qty: 16 G | Refills: 0 | Status: SHIPPED | OUTPATIENT
Start: 2021-10-27

## 2021-10-27 ASSESSMENT — LIFESTYLE VARIABLES
SMOKING_YEARS: 15
SMOKING_STATUS: YES

## 2021-10-27 NOTE — PROGRESS NOTES
HPI: per patient's questionnaire    EXAM: not applicable    Diagnoses and all orders for this visit:    1. Acute bacterial sinusitis    - fluticasone (FLONASE) 50 MCG/ACT nasal spray; 2 sprays by Nasal route daily  Dispense: 16 g; Refill: 0  - azithromycin (ZITHROMAX) 250 MG tablet; 500 mg orally on day one followed by 250 mg daily on days two through five  Dispense: 6 tablet; Refill: 0  - guaiFENesin (MUCINEX) 600 MG extended release tablet; Take 1 tablet by mouth 2 times daily  Dispense: 30 tablet; Refill: 0  - cetirizine (ZYRTEC ALLERGY) 10 MG tablet; Take 1 tablet by mouth daily  Dispense: 60 tablet; Refill: 0      No orders of the defined types were placed in this encounter. Patient was advised to contact PCP if symptoms worsen or failing to change as expected        -20-30 minutes were spent on the digital evaluation and management of this patient.   Electronically signed by Lizbeth Pereira MD on 10/27/21 at  7:43 AM

## 2021-10-29 NOTE — TELEPHONE ENCOUNTER
Patient tried doing an E-Visit but it says she needs to call her doctor. Patient works full time and goes to school so does not have time to go to walk in or make an appointment.    Symptoms: nasal congestion (light yellow in color), cough (night or in morning), sinus pressure, headache, itchy ears, watery eyes, no sore throat    [facility] [address] Relevant Problems   No relevant active problems       Anesthetic History   No history of anesthetic complications            Review of Systems / Medical History  Patient summary reviewed, nursing notes reviewed and pertinent labs reviewed    Pulmonary                   Neuro/Psych   Within defined limits           Cardiovascular      Valvular problems/murmurs: aortic stenosis        CAD and hyperlipidemia    Exercise tolerance: >4 METS     GI/Hepatic/Renal               Comments: ULCERATIVE COLITIS.   Endo/Other        Arthritis     Other Findings   Comments: Chest pain         Physical Exam    Airway  Mallampati: I  TM Distance: > 6 cm  Neck ROM: normal range of motion   Mouth opening: Normal     Cardiovascular    Rhythm: regular  Rate: normal    Murmur: Grade 2, Aortic area     Dental      Comments: DENTURES   Pulmonary  Breath sounds clear to auscultation               Abdominal  GI exam deferred       Other Findings            Anesthetic Plan    ASA: 2  Anesthesia type: total IV anesthesia and MAC          Induction: Intravenous  Anesthetic plan and risks discussed with: Patient

## 2021-11-15 DIAGNOSIS — R07.89 MUSCULOSKELETAL CHEST PAIN: ICD-10-CM

## 2021-11-15 DIAGNOSIS — F41.9 ANXIETY: ICD-10-CM

## 2021-11-15 DIAGNOSIS — F31.81 BIPOLAR II DISORDER (HCC): ICD-10-CM

## 2021-11-15 RX ORDER — BUSPIRONE HYDROCHLORIDE 10 MG/1
10 TABLET ORAL 2 TIMES DAILY
Qty: 60 TABLET | Refills: 3 | Status: SHIPPED | OUTPATIENT
Start: 2021-11-15 | End: 2022-04-29

## 2021-11-15 RX ORDER — CHOLECALCIFEROL (VITAMIN D3) 125 MCG
CAPSULE ORAL
Qty: 90 TABLET | Refills: 3 | Status: SHIPPED | OUTPATIENT
Start: 2021-11-15

## 2021-11-15 NOTE — TELEPHONE ENCOUNTER
Please Approve or Refuse.   Send to Pharmacy per Pt's Request:     Next Visit Date:  12/1/2021   Last Visit Date: 10/27/2021    Hemoglobin A1C (%)   Date Value   05/19/2021 5.2   11/12/2019 5.4   10/30/2018 5.4             ( goal A1C is < 7)   BP Readings from Last 3 Encounters:   05/19/21 130/86   12/25/20 138/88   02/11/20 120/80          (goal 120/80)  BUN   Date Value Ref Range Status   01/08/2021 12 6 - 20 mg/dL Final     CREATININE   Date Value Ref Range Status   01/08/2021 0.67 0.50 - 0.90 mg/dL Final     Potassium   Date Value Ref Range Status   01/08/2021 4.2 3.7 - 5.3 mmol/L Final

## 2022-01-06 ENCOUNTER — TELEMEDICINE (OUTPATIENT)
Dept: FAMILY MEDICINE CLINIC | Age: 32
End: 2022-01-06
Payer: MEDICARE

## 2022-01-06 DIAGNOSIS — J20.9 ACUTE BRONCHITIS DUE TO INFECTION: Primary | ICD-10-CM

## 2022-01-06 DIAGNOSIS — U07.1 COVID-19 VIRUS INFECTION: ICD-10-CM

## 2022-01-06 DIAGNOSIS — F31.81 BIPOLAR II DISORDER (HCC): ICD-10-CM

## 2022-01-06 PROCEDURE — 99214 OFFICE O/P EST MOD 30 MIN: CPT | Performed by: FAMILY MEDICINE

## 2022-01-06 PROCEDURE — G8427 DOCREV CUR MEDS BY ELIG CLIN: HCPCS | Performed by: FAMILY MEDICINE

## 2022-01-06 RX ORDER — DEXAMETHASONE 1 MG
1 TABLET ORAL EVERY 6 HOURS
Qty: 40 TABLET | Refills: 0 | Status: SHIPPED | OUTPATIENT
Start: 2022-01-06 | End: 2022-01-16

## 2022-01-06 RX ORDER — GUAIFENESIN 600 MG/1
600 TABLET, EXTENDED RELEASE ORAL 2 TIMES DAILY
Qty: 30 TABLET | Refills: 0 | Status: SHIPPED | OUTPATIENT
Start: 2022-01-06 | End: 2022-07-24 | Stop reason: ALTCHOICE

## 2022-01-06 RX ORDER — ALBUTEROL SULFATE 90 UG/1
2 AEROSOL, METERED RESPIRATORY (INHALATION) 4 TIMES DAILY PRN
Qty: 18 G | Refills: 0 | Status: SHIPPED | OUTPATIENT
Start: 2022-01-06

## 2022-01-06 ASSESSMENT — ENCOUNTER SYMPTOMS
VOMITING: 0
NAUSEA: 0
SINUS PRESSURE: 1
SHORTNESS OF BREATH: 0
RHINORRHEA: 1
SINUS PAIN: 1
COUGH: 1
ABDOMINAL PAIN: 0

## 2022-01-06 ASSESSMENT — PATIENT HEALTH QUESTIONNAIRE - PHQ9
SUM OF ALL RESPONSES TO PHQ QUESTIONS 1-9: 1
SUM OF ALL RESPONSES TO PHQ9 QUESTIONS 1 & 2: 1
SUM OF ALL RESPONSES TO PHQ QUESTIONS 1-9: 1
1. LITTLE INTEREST OR PLEASURE IN DOING THINGS: 0
2. FEELING DOWN, DEPRESSED OR HOPELESS: 1
SUM OF ALL RESPONSES TO PHQ QUESTIONS 1-9: 1
SUM OF ALL RESPONSES TO PHQ QUESTIONS 1-9: 1

## 2022-01-06 NOTE — PROGRESS NOTES
2022    TELEHEALTH EVALUATION -- Audio/Visual (During [place] [address] public health emergency)      Breanna Haywood (:  1990) is a 27 y.o. female,Established patient, here for evaluation of the following chief complaint(s): Forms (FMLA), Concern For COVID-19 (has COVID for 2 days), and Stress        ASSESSMENT/PLAN:    1. Acute bronchitis due to infection  Ongoing, due to COVID-19 infection, symptomatic treatment given, to absolutely abstain from smoking  -     guaiFENesin (MUCINEX) 600 MG extended release tablet; Take 1 tablet by mouth 2 times daily, Disp-30 tablet, R-0Normal  -     dexamethasone (DECADRON) 1 MG tablet; Take 1 tablet by mouth every 6 hours for 10 days With low carb low salt diet, Disp-40 tablet, R-0Normal  -     albuterol sulfate HFA (VENTOLIN HFA) 108 (90 Base) MCG/ACT inhaler; Inhale 2 puffs into the lungs 4 times daily as needed for Wheezing, Disp-18 g, R-0Normal    Patient to continue to self isolate for a total of 10 days since the onset of symptoms, 2022 through 2020      2. COVID-19 virus infection  Ongoing, she did receive 2 COVID-19 vaccines but overdue for booster  -     guaiFENesin (MUCINEX) 600 MG extended release tablet; Take 1 tablet by mouth 2 times daily, Disp-30 tablet, R-0Normal  -     dexamethasone (DECADRON) 1 MG tablet; Take 1 tablet by mouth every 6 hours for 10 days With low carb low salt diet, Disp-40 tablet, R-0Normal  -     albuterol sulfate HFA (VENTOLIN HFA) 108 (90 Base) MCG/ACT inhaler; Inhale 2 puffs into the lungs 4 times daily as needed for Wheezing, Disp-18 g, R-0Normal    3. Bipolar II disorder ([facility] [address]  Slightly worsening due to stress and ongoing pandemic  We will continue to monitor  She benefits from having FMLA for her to be able to care for her son with Down syndrome whenever is needed. Patient reports the letter I gave her for work is not enough and she needs FMLA instead. Patient reports she needs FMLA for appointments for her son.   She is the primary caregiver for her son. She also needs to be able to pick him up from school when sick or when having a bad day at school, or to stay at home when he is sick and to care for him. Patient son, Emilio Momin, has Down syndrome, he is mostly nonverbal.  Latesha Vasquez also cannot work more than 8-hour shifts, cannot be mandated to work more than 8 hours because she does not have anyone to supervise her son. Naatsha received counseling on the following healthy behaviors: nutrition, exercise, medication adherence and tobacco cessation  Reviewed prior labs and health maintenance  Discussed use, benefit, and side effects of prescribed medications. Barriers to medication compliance addressed. Patient given educational materials - see patient instructions  All patient questions answered. Patient voiced understanding. The patient's past medical,surgical, social, and family history as well as her current medications and allergies were reviewed as documented in today's encounter. Medications, labs, diagnostic studies, consultations and follow-up as documented in this encounter. Return in about 3 months (around 2022) for Face-2F-30mins PHYSICAL, VISION screen, PHQ9. .    Data Unavailable    Future Appointments   Date Time Provider Cecelia Garner   2022  2:45 PM [facility]         SUBJECTIVE/OBJECTIVE:  Claudia Appiah Yobany (:  1990) has requested an audio/video evaluation for the following concern(s):Forms (FMLA), Concern For COVID-19 (has COVID for 2 days), and Stress      Patient reports she tested positive for COVID-19 2 days ago. She received Covid-19 vaccine x2 but not the booster. She is not vaccinated against flu either. Patient currently reports cough, congestion, postnasal drip, clear rhinorrhea, sinus pressure and sinus headache, chills, decreased appetite, fatigue and laying in bed as much as she can, muscle aches  She denies fever or night sweats.   She denies earache, nausea, vomiting, diarrhea. Has been off work for 2 days due to testing positive. Patient has known bipolar disorder type II  Patient is also in need for FMLA to be able to care for her son, Ilir Lin, who has Down syndrome and he is 15year-old, he is also my patient. Master is mostly nonverbal  His mom, Sherry Calderon is the primary caregiver. I did give patient a letter for work not to be mandated to do shifts more than 8 hours, however her work wants her to have FMLA instead. This is making her anxiety and stress worse. PHQ-2 Over the past 2 weeks, how often have you been bothered by any of the following problems? Little interest or pleasure in doing things: Not at all  Feeling down, depressed, or hopeless: Several days  PHQ-2 Score: 1  PHQ-9 Over the past 2 weeks, how often have you been bothered by any of the following problems? PHQ-9 Total Score: 1  PHQ-9 Total Score: 1        PHQ Scores 1/6/2022 8/11/2021 5/19/2021 9/24/2020 8/26/2020 9/24/2019 8/20/2019   PHQ2 Score 1 0 2 0 0 0 1   PHQ9 Score 1 0 2 0 0 0 6       Review of Systems   Constitutional: Positive for activity change, appetite change, chills and fatigue. Negative for diaphoresis and fever. HENT: Positive for congestion, postnasal drip, rhinorrhea, sinus pressure and sinus pain. Negative for ear discharge and ear pain. Respiratory: Positive for cough. Negative for chest tightness, shortness of breath and wheezing. Cardiovascular: Negative for chest pain, palpitations and leg swelling. Gastrointestinal: Negative for abdominal distention, abdominal pain, constipation, diarrhea, nausea and vomiting. Musculoskeletal: Positive for myalgias. Psychiatric/Behavioral: Positive for dysphoric mood and sleep disturbance. Negative for self-injury and suicidal ideas. The patient is nervous/anxious. Prior to Visit Medications    Medication Sig Taking?  Authorizing Provider   busPIRone (BUSPAR) 10 MG tablet TAKE 1 TABLET BY MOUTH 2 TIMES DAILY Yes Trisha Yoon MD   Cholecalciferol (VITAMIN D3) 50 MCG (2000 UT) TABS TAKE 1 TABLET BY MOUTH DAILY Yes Trisha Yoon MD   fluticasone (FLONASE) 50 MCG/ACT nasal spray 2 sprays by Nasal route daily Yes Phuong Coats MD   guaiFENesin (MUCINEX) 600 MG extended release tablet Take 1 tablet by mouth 2 times daily Yes Phuong Coats MD   cetirizine (ZYRTEC ALLERGY) 10 MG tablet Take 1 tablet by mouth daily Yes Phuong Coats MD   omeprazole (PRILOSEC) 40 MG delayed release capsule Take 1 capsule by mouth daily Decrease to once a day Yes Trisha Yoon MD   butalbital-acetaminophen-caffeine (FIORICET, ESGIC) -40 MG per tablet Take 1 tablet by mouth every 6 hours as needed for Headaches or Migraine MAX 3 DAYS/WEEK Yes Trisha Yoon MD   cetirizine (ZYRTEC) 10 MG tablet TAKE 1 TABLET BY MOUTH DAILY Yes Trisha Yoon MD   Masks (CLEVER CHOICE FACE MASK) MISC USE NEW FACE MASK EVERYDAY WHEN PATIENT GO OUTSIDE OF HOME DUE TO COVID PANDEMIC Yes Trisha Yoon MD   Elastic Bandages & Supports (WRIST SPLINT/NEOPRENE RIGHT RICHARD) MISC Needs right wrist splint Yes Trisha Yoon MD   MAPAP 500 MG tablet TAKE 1 TABLET BY MOUTH EVERY 6 HOURS AS NEEDED FOR PAIN Yes Trisha Yoon MD       Social History     Tobacco Use    Smoking status: Current Every Day Smoker     Packs/day: 0.50     Years: 9.00     Pack years: 4.50     Types: Cigarettes    Smokeless tobacco: Never Used   Vaping Use    Vaping Use: Not on file   Substance Use Topics    Alcohol use:  Yes     Alcohol/week: 0.0 standard drinks     Comment: occasionally    Drug use: No          PHYSICAL EXAMINATION:    Vital Signs: (As obtained by patient/caregiver or practitioner observation)  -vital signs stable and within normal limits except obesity per BMI BMI 31.28 KG/M2    Patient-Reported Vitals 1/6/2022   Patient-Reported Weight 188 lbs   Patient-Reported Height 5 ft 5 in   Patient-Reported Systolic - Patient-Reported Diastolic -   Patient-Reported Pulse -   Patient-Reported Temperature -   Patient-Reported SpO2 -           Constitutional: [x] Appears well-developed and well-nourished [x] No apparent distress      [x] Abnormal-obese, looks tired      Mental status  [x] Alert and awake  [x] Oriented to person/place/time [x]Able to follow commands      Eyes:  EOM    [x]  Normal  [] Abnormal-  Sclera  [x]  Normal  [] Abnormal -         Discharge [x]  None visible  [] Abnormal -    HENT:   [x] Normocephalic, atraumatic. [] Abnormal   [x] Mouth/Throat: Mucous membranes are moist.     External Ears [x] Normal  [] Abnormal-     Neck: [x] No visualized mass     Pulmonary/Chest: [x] Respiratory effort normal.  [x] No visualized signs of difficulty breathing or respiratory distress        [x] Abnormal -coughing during the encounter       Musculoskeletal:   [x] Normal gait with no signs of ataxia         [x] Normal range of motion of neck        [] Abnormal-       Neurological:        [x] No Facial Asymmetry (Cranial nerve 7 motor function) (limited exam to video visit)          [x] No gaze palsy        [] Abnormal-            Skin:        [x] No significant exanthematous lesions or discoloration noted on facial skin         [] Abnormal-            Psychiatric:      [x] No Hallucinations       []Mood is normal      [x]Behavior is normal      [x]Judgment is normal      [x]Thought content is normal       [x] Abnormal-very anxious    Other pertinent observable physical exam findings-none    Due to this being a TeleHealth encounter, evaluation of the following organ systems is limited: Vitals/Constitutional/EENT/Resp/CV/GI//MS/Neuro/Skin/Heme-Lymph-Imm.       Orders Placed This Encounter   Medications    guaiFENesin (MUCINEX) 600 MG extended release tablet     Sig: Take 1 tablet by mouth 2 times daily     Dispense:  30 tablet     Refill:  0    dexamethasone (DECADRON) 1 MG tablet     Sig: Take 1 tablet by mouth every 6 hours for 10 days With low carb low salt diet     Dispense:  40 tablet     Refill:  0    albuterol sulfate HFA (VENTOLIN HFA) 108 (90 Base) MCG/ACT inhaler     Sig: Inhale 2 puffs into the lungs 4 times daily as needed for Wheezing     Dispense:  18 g     Refill:  0       No orders of the defined types were placed in this encounter. Medications Discontinued During This Encounter   Medication Reason    guaiFENesin (MUCINEX) 600 MG extended release tablet REORDER              Natasha L Etts, was evaluated through a synchronous (real-time) audio-video encounter. The patient (or guardian if applicable) is aware that this is a billable service. Verbal consent to proceed has been obtained within the past 12 months. The visit was conducted pursuant to the emergency declaration under the [address] [place] [address], [address] [place] [address] authority and the [employer] General Act. Patient identification was verified    Patient was alone   The patient was located in a state where the provider was credentialed to provide care. On this date 1/6/2022 I have spent 35 minutes reviewing previous notes, test results and face to face with the patient discussing the diagnosis and importance of compliance with the treatment plan as well as documenting on the day of the visit and to complete her FMLA. --Taina Seals MD on 1/9/2022 at 9:48 PM    An electronic signature was used to authenticate this note.

## 2022-01-09 PROBLEM — U07.1 COVID-19 VIRUS INFECTION: Status: ACTIVE | Noted: 2022-01-09

## 2022-01-09 ASSESSMENT — ENCOUNTER SYMPTOMS
CONSTIPATION: 0
ABDOMINAL DISTENTION: 0
DIARRHEA: 0
CHEST TIGHTNESS: 0
WHEEZING: 0

## 2022-01-10 DIAGNOSIS — K21.9 GASTROESOPHAGEAL REFLUX DISEASE WITHOUT ESOPHAGITIS: ICD-10-CM

## 2022-01-10 RX ORDER — OMEPRAZOLE 40 MG/1
40 CAPSULE, DELAYED RELEASE ORAL DAILY
Qty: 90 CAPSULE | Refills: 0 | Status: SHIPPED | OUTPATIENT
Start: 2022-01-10 | End: 2022-04-05

## 2022-03-08 DIAGNOSIS — J30.89 SEASONAL ALLERGIC RHINITIS DUE TO OTHER ALLERGIC TRIGGER: ICD-10-CM

## 2022-03-08 RX ORDER — CETIRIZINE HYDROCHLORIDE 10 MG/1
10 TABLET ORAL DAILY
Qty: 90 TABLET | Refills: 3 | Status: SHIPPED | OUTPATIENT
Start: 2022-03-08

## 2022-03-08 NOTE — TELEPHONE ENCOUNTER
Please Approve or Refuse.   Send to Pharmacy per Pt's Request: [facility]     Next Visit Date:  6/23/2022   Last Visit Date: 1/6/2022    Hemoglobin A1C (%)   Date Value   05/19/2021 5.2   11/12/2019 5.4   10/30/2018 5.4             ( goal A1C is < 7)   BP Readings from Last 3 Encounters:   05/19/21 130/86   12/25/20 138/88   02/11/20 120/80          (goal 120/80)  BUN   Date Value Ref Range Status   01/08/2021 12 6 - 20 mg/dL Final     CREATININE   Date Value Ref Range Status   01/08/2021 0.67 0.50 - 0.90 mg/dL Final     Potassium   Date Value Ref Range Status   01/08/2021 4.2 3.7 - 5.3 mmol/L Final

## 2022-03-28 ENCOUNTER — OFFICE VISIT (OUTPATIENT)
Dept: FAMILY MEDICINE CLINIC | Age: 32
End: 2022-03-28
Payer: MEDICARE

## 2022-03-28 VITALS
OXYGEN SATURATION: 99 % | TEMPERATURE: 97.9 F | HEART RATE: 76 BPM | SYSTOLIC BLOOD PRESSURE: 134 MMHG | DIASTOLIC BLOOD PRESSURE: 88 MMHG

## 2022-03-28 DIAGNOSIS — J06.9 VIRAL URI: Primary | ICD-10-CM

## 2022-03-28 DIAGNOSIS — J02.9 SORE THROAT: ICD-10-CM

## 2022-03-28 LAB — S PYO AG THROAT QL: NORMAL

## 2022-03-28 PROCEDURE — 99213 OFFICE O/P EST LOW 20 MIN: CPT | Performed by: NURSE PRACTITIONER

## 2022-03-28 PROCEDURE — G8484 FLU IMMUNIZE NO ADMIN: HCPCS | Performed by: NURSE PRACTITIONER

## 2022-03-28 PROCEDURE — 87880 STREP A ASSAY W/OPTIC: CPT | Performed by: NURSE PRACTITIONER

## 2022-03-28 PROCEDURE — G8417 CALC BMI ABV UP PARAM F/U: HCPCS | Performed by: NURSE PRACTITIONER

## 2022-03-28 PROCEDURE — 4004F PT TOBACCO SCREEN RCVD TLK: CPT | Performed by: NURSE PRACTITIONER

## 2022-03-28 PROCEDURE — G8427 DOCREV CUR MEDS BY ELIG CLIN: HCPCS | Performed by: NURSE PRACTITIONER

## 2022-03-28 ASSESSMENT — ENCOUNTER SYMPTOMS
NAUSEA: 0
COUGH: 1
SORE THROAT: 1
VOMITING: 0
CHANGE IN BOWEL HABIT: 0
ABDOMINAL PAIN: 0

## 2022-03-28 NOTE — PATIENT INSTRUCTIONS
Follow up with family doctor in 1 week as needed. Return immediately if worse, new symptoms develop, symptoms persist or have any questions or concerns. Push fluids, keep hydrated  Cool mist humidifier bedside  Continue all medications as prescribed  May alternate tylenol/motrin over the counter for pain or fever, take per package instructions. Patient Education        Viral Respiratory Infection: Care Instructions  Your Care Instructions     Viruses are very small organisms. They grow in number after they enter your body. There are many types that cause different illnesses, such as colds and the mumps. The symptoms of a viral respiratory infection often start quickly. They include a fever, sore throat, and runny nose. You may also just not feel well. Or you may not want to eat much. Most viral respiratory infections are not serious. They usually get better with time and self-care. Antibiotics are not used to treat a viral infection. That's because antibiotics will not help cure a viral illness. In some cases, antiviral medicine can help your body fight a serious viral infection. Follow-up care is a key part of your treatment and safety. Be sure to make and go to all appointments, and call your doctor if you are having problems. It's also a good idea to know your test results and keep a list of the medicines you take. How can you care for yourself at home? · Rest as much as possible until you feel better. · Be safe with medicines. Take your medicine exactly as prescribed. Call your doctor if you think you are having a problem with your medicine. You will get more details on the specific medicine your doctor prescribes. · Take an over-the-counter pain medicine, such as acetaminophen (Tylenol), ibuprofen (Advil, Motrin), or naproxen (Aleve), as needed for pain and fever. Read and follow all instructions on the label. Do not give aspirin to anyone younger than 20.  It has been linked to Reye syndrome, a serious illness. · Drink plenty of fluids. Hot fluids, such as tea or soup, may help relieve congestion in your nose and throat. If you have kidney, heart, or liver disease and have to limit fluids, talk with your doctor before you increase the amount of fluids you drink. · Try to clear mucus from your lungs by breathing deeply and coughing. · Gargle with warm salt water once an hour. This can help reduce swelling and throat pain. Use 1 teaspoon of salt mixed in 1 cup of warm water. · Do not smoke or allow others to smoke around you. If you need help quitting, talk to your doctor about stop-smoking programs and medicines. These can increase your chances of quitting for good. To avoid spreading the virus  · Cough or sneeze into a tissue. Then throw the tissue away. · If you don't have a tissue, use your hand to cover your cough or sneeze. Then clean your hand. You can also cough into your sleeve. · Wash your hands often. Use soap and warm water. Wash for 15 to 20 seconds each time. · If you don't have soap and water near you, you can clean your hands with alcohol wipes or gel. When should you call for help? Call your doctor now or seek immediate medical care if:    · You have a new or higher fever.     · Your fever lasts more than 48 hours.     · You have trouble breathing.     · You have a fever with a stiff neck or a severe headache.     · You are sensitive to light.     · You feel very sleepy or confused. Watch closely for changes in your health, and be sure to contact your doctor if:    · You do not get better as expected. Where can you learn more? Go to https://c-[employer]cta.[employer]. org and sign in to your [employer] account. Enter P772 in the [employer] box to learn more about \"Viral Respiratory Infection: Care Instructions. \"     If you do not have an account, please click on the \"Sign Up Now\" link.   Current as of: July 6, 2021               Content Version: 13.1  © 8562-3818 Healthwise, Incorporated. Care instructions adapted under license by [facility] ([facility]). If you have questions about a medical condition or this instruction, always ask your healthcare professional. [address] any warranty or liability for your use of this information.

## 2022-03-28 NOTE — PROGRESS NOTES
[facility]  [address] [address]  Dept: [phone]  Dept Fax: [phone]    Goyo Mcrae is a 27 y.o. female who presents to the urgent care today for her medical conditions/complaints as notedbelow. Natasha CARRASQUILLO Etts is c/o of Pharyngitis (onset last night), Cough (dry), and Nasal Congestion      HPI:     27 yr old female presents for st, dry cough and nasal congestion. Here with son with same. 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 The current episode started yesterday. The problem occurs constantly. The problem has been unchanged. Associated symptoms include congestion, coughing, headaches and a sore throat. Pertinent negatives include no abdominal pain, anorexia, arthralgias, change in bowel habit, chest pain, chills, diaphoresis, fatigue, fever, myalgias, nausea, neck pain, numbness, rash, vomiting or weakness. Nothing aggravates the symptoms. She has tried acetaminophen for the symptoms. The treatment provided no relief. Cough  Associated symptoms include headaches and a sore throat. Pertinent negatives include no chest pain, chills, fever, myalgias or rash.        Past Medical History:   Diagnosis Date    Anxiety     ASCUS (atypical squamous cells of undetermined significance) on Pap smear 8/1/07, 1/26/10, 6/13/11, 7/10/12, 4/24/13    Bipolar II disorder (HCC)     Cervical high risk HPV (human papillomavirus) test positive 1/26/10, 6/13/11, 7/10/12, 4/24/13    Chronic back pain     Chronic kidney disease     Chronic pelvic pain in female     BRUCE II (cervical intraepithelial neoplasia II) 7/22/13    on LEEP    Cyst of left kidney     Depression     Dysmenorrhea 6/27/2013    Gastroesophageal reflux disease without esophagitis 5/24/2019    Hernia     History of total abdominal hysterectomy 7/7/14    RSO    HPV (human papilloma virus) infection 10/27/2011    Laryngeal Papillomatosis counseling and Route reviewed     Incomplete RBBB 1/23/2019    Intractable headache r/o meningitis 10/16/2014    Menopausal hot flushes 10/14/2014    Obesity 3/5/2019    Obesity (BMI 30.0-34.9) 10/8/2014    Ovarian cyst rupture     was hospitalized for infection 2014 (April?)    Ovarian cyst, bilateral     Ovarian cyst, follicular 6/8/7998    SMALL CYST.  Right side, 4cm, +doppler flow Tumor markers negative     PTSD (post-traumatic stress disorder)     Seasonal allergic rhinitis 5/24/2019    Seasonal allergic rhinitis     Tobacco abuse     Tobacco abuse     Urinary retention with incomplete bladder emptying     HX    Wears glasses     night vision issues        Current Outpatient Medications   Medication Sig Dispense Refill    cetirizine (ZYRTEC) 10 MG tablet TAKE 1 TABLET BY MOUTH DAILY 90 tablet 3    omeprazole (PRILOSEC) 40 MG delayed release capsule TAKE 1 CAPSULE BY MOUTH DAILY DECREASE TO ONCE A DAY 90 capsule 0    albuterol sulfate HFA (VENTOLIN HFA) 108 (90 Base) MCG/ACT inhaler Inhale 2 puffs into the lungs 4 times daily as needed for Wheezing 18 g 0    busPIRone (BUSPAR) 10 MG tablet TAKE 1 TABLET BY MOUTH 2 TIMES DAILY 60 tablet 3    Cholecalciferol (VITAMIN D3) 50 MCG (2000 UT) TABS TAKE 1 TABLET BY MOUTH DAILY 90 tablet 3    fluticasone (FLONASE) 50 MCG/ACT nasal spray 2 sprays by Nasal route daily 16 g 0    Masks (CLEVER CHOICE FACE MASK) [facility] USE NEW FACE MASK EVERYDAY WHEN PATIENT GO OUTSIDE OF HOME DUE TO COVID PANDEMIC 30 each 5    Elastic Bandages & Supports (WRIST SPLINT/NEOPRENE RIGHT MD) MISC Needs right wrist splint 1 each 0    MAPAP 500 MG tablet TAKE 1 TABLET BY MOUTH EVERY 6 HOURS AS NEEDED FOR PAIN 120 tablet 1    guaiFENesin (MUCINEX) 600 MG extended release tablet Take 1 tablet by mouth 2 times daily (Patient not taking: Reported on 3/28/2022) 30 tablet 0    fluticasone (FLONASE) 50 MCG/ACT nasal spray 2 sprays by Nasal route daily 16 g 0    butalbital-acetaminophen-caffeine (FIORICET, ESGIC) -40 MG per tablet Take 1 tablet by mouth every 6 hours as needed for Headaches or Migraine MAX 3 DAYS/WEEK 30 tablet 0    Masks (CLEVER CHOICE FACE MASK) [facility] USE NEW FACE MASK EVERYDAY WHEN PATIENT GO OUTSIDE OF HOME DUE TO COVID PANDEMIC 30 each 5    Elastic Bandages & Supports (WRIST SPLINT/NEOPRENE RIGHT MD) [facility] Needs right wrist splint 1 each 0     No current facility-administered medications for this visit. Allergies   Allergen Reactions    Shellfish-Derived Products     Levaquin [Levofloxacin In D5w]      Tendon pain       Subjective:      Review of Systems   Constitutional: Negative for chills, diaphoresis, fatigue and fever. HENT: Positive for congestion and sore throat. Respiratory: Positive for cough. Cardiovascular: Negative for chest pain. Gastrointestinal: Negative for abdominal pain, anorexia, change in bowel habit, nausea and vomiting. Musculoskeletal: Negative for arthralgias, myalgias and neck pain. Skin: Negative for rash. Neurological: Positive for headaches. Negative for weakness and numbness. All other systems reviewed and are negative. 14 systems reviewed and negative except as listed in HPI. Objective:     Physical Exam  Vitals and nursing note reviewed. Constitutional:       General: She is not in acute distress. Appearance: Normal appearance. She is well-developed. She is not ill-appearing, toxic-appearing or diaphoretic. Comments: nontoxic   HENT:      Head: Normocephalic and atraumatic. Right Ear: Tympanic membrane, ear canal and external ear normal.      Left Ear: Tympanic membrane, ear canal and external ear normal.      Nose: Congestion present. No rhinorrhea.       Mouth/Throat:      Mouth: Mucous membranes are moist.      Pharynx: Posterior oropharyngeal erythema present. No oropharyngeal exudate. Comments: Thin white pnd  Swallows without difficulty  Eyes:      General: No scleral icterus. Right eye: No discharge. Left eye: No discharge. Extraocular Movements: Extraocular movements intact. Conjunctiva/sclera: Conjunctivae normal.      Pupils: Pupils are equal, round, and reactive to light. Cardiovascular:      Rate and Rhythm: Normal rate and regular rhythm. Pulses: Normal pulses. Heart sounds: Normal heart sounds. Pulmonary:      Effort: Pulmonary effort is normal. No respiratory distress. Breath sounds: Normal breath sounds. No stridor. No wheezing, rhonchi or rales. Chest:      Chest wall: No tenderness. Abdominal:      General: Bowel sounds are normal. There is no distension. Palpations: Abdomen is soft. Tenderness: There is no abdominal tenderness. Musculoskeletal:         General: No tenderness or deformity. Normal range of motion. Cervical back: Normal range of motion and neck supple. Lymphadenopathy:      Cervical: No cervical adenopathy. Skin:     General: Skin is warm and dry. Findings: No rash ( no rash to visible skin). Neurological:      General: No focal deficit present. Mental Status: She is alert and oriented to person, place, and time. Motor: No abnormal muscle tone. Coordination: Coordination normal.   Psychiatric:         Mood and Affect: Mood normal.         Behavior: Behavior normal.       /88 (Site: Right Upper Arm, Position: Sitting, Cuff Size: Medium Adult)   Pulse 76   Temp 97.9 °F (36.6 °C) (Infrared)   LMP 06/21/2013   SpO2 99%     Assessment:       Diagnosis Orders   1. Viral URI     2.  Sore throat         Plan:     Results for POC orders placed in visit on 03/28/22   POCT rapid strep A   Result Value Ref Range    Strep A Ag None Detected None Detected         POCT strep neg  Add flonase or other decongestant Reviewed over-the-counter treatments for symptom management. Declines covid testing - neg home rapid  works as nurse  Return if symptoms worsen or fail to improve, for Make an Appt. with your Primary Care in 1 week. No orders of the defined types were placed in this encounter. Patient given educational materials - see patient instructions. Discussed use, benefit, and side effects of prescribed medications. All patient questions answered. Pt voicedunderstanding.     Electronically signed by CONRAD Brown CNP on 3/28/2022 at 11:15 AM

## 2022-04-05 DIAGNOSIS — K21.9 GASTROESOPHAGEAL REFLUX DISEASE WITHOUT ESOPHAGITIS: ICD-10-CM

## 2022-04-05 RX ORDER — OMEPRAZOLE 40 MG/1
40 CAPSULE, DELAYED RELEASE ORAL DAILY
Qty: 90 CAPSULE | Refills: 0 | Status: SHIPPED | OUTPATIENT
Start: 2022-04-05 | End: 2022-04-29

## 2022-04-05 NOTE — TELEPHONE ENCOUNTER
Please Approve or Refuse.   Send to Pharmacy per Pt's Request:      Next Visit Date:  6/23/2022   Last Visit Date: 1/6/2022    Hemoglobin A1C (%)   Date Value   05/19/2021 5.2   11/12/2019 5.4   10/30/2018 5.4             ( goal A1C is < 7)   BP Readings from Last 3 Encounters:   03/28/22 134/88   05/19/21 130/86   12/25/20 138/88          (goal 120/80)  BUN   Date Value Ref Range Status   01/08/2021 12 6 - 20 mg/dL Final     CREATININE   Date Value Ref Range Status   01/08/2021 0.67 0.50 - 0.90 mg/dL Final     Potassium   Date Value Ref Range Status   01/08/2021 4.2 3.7 - 5.3 mmol/L Final

## 2022-04-14 ENCOUNTER — TELEPHONE (OUTPATIENT)
Dept: FAMILY MEDICINE CLINIC | Age: 32
End: 2022-04-14

## 2022-04-14 DIAGNOSIS — F31.81 BIPOLAR II DISORDER (HCC): Primary | ICD-10-CM

## 2022-04-14 DIAGNOSIS — F41.9 ANXIETY: ICD-10-CM

## 2022-04-14 NOTE — TELEPHONE ENCOUNTER
Please give her an appointment     Diagnosis Orders   1. Bipolar II disorder [facility])  3 [facility])   2.  Sara Dorsey [address] ([facility])        Future Appointments   Date Time Provider Cecelia Garner   6/23/2022  2:45 PM [facility]

## 2022-04-29 DIAGNOSIS — F41.9 ANXIETY: ICD-10-CM

## 2022-04-29 DIAGNOSIS — F31.81 BIPOLAR II DISORDER (HCC): ICD-10-CM

## 2022-04-29 DIAGNOSIS — K21.9 GASTROESOPHAGEAL REFLUX DISEASE WITHOUT ESOPHAGITIS: ICD-10-CM

## 2022-04-29 RX ORDER — BUSPIRONE HYDROCHLORIDE 10 MG/1
10 TABLET ORAL 2 TIMES DAILY
Qty: 60 TABLET | Refills: 3 | Status: SHIPPED | OUTPATIENT
Start: 2022-04-29 | End: 2022-06-23 | Stop reason: ALTCHOICE

## 2022-04-29 RX ORDER — OMEPRAZOLE 40 MG/1
40 CAPSULE, DELAYED RELEASE ORAL DAILY
Qty: 90 CAPSULE | Refills: 0 | Status: SHIPPED | OUTPATIENT
Start: 2022-04-29

## 2022-05-19 ENCOUNTER — E-VISIT (OUTPATIENT)
Dept: FAMILY MEDICINE CLINIC | Age: 32
End: 2022-05-19
Payer: MEDICARE

## 2022-05-19 DIAGNOSIS — F17.200 SMOKER: ICD-10-CM

## 2022-05-19 DIAGNOSIS — J01.80 ACUTE NON-RECURRENT SINUSITIS OF OTHER SINUS: Primary | ICD-10-CM

## 2022-05-19 DIAGNOSIS — J20.9 ACUTE BRONCHITIS DUE TO INFECTION: ICD-10-CM

## 2022-05-19 PROCEDURE — 99422 OL DIG E/M SVC 11-20 MIN: CPT | Performed by: FAMILY MEDICINE

## 2022-05-19 RX ORDER — AZITHROMYCIN 250 MG/1
TABLET, FILM COATED ORAL
Qty: 6 TABLET | Refills: 0 | Status: SHIPPED | OUTPATIENT
Start: 2022-05-19 | End: 2022-05-24

## 2022-05-19 RX ORDER — BENZONATATE 100 MG/1
100 CAPSULE ORAL 3 TIMES DAILY PRN
Qty: 21 CAPSULE | Refills: 0 | Status: SHIPPED | OUTPATIENT
Start: 2022-05-19 | End: 2022-05-26

## 2022-05-19 RX ORDER — LORATADINE AND PSEUDOEPHEDRINE SULFATE 5; 120 MG/1; MG/1
1 TABLET, EXTENDED RELEASE ORAL 2 TIMES DAILY PRN
Qty: 10 TABLET | Refills: 0 | Status: SHIPPED | OUTPATIENT
Start: 2022-05-19

## 2022-05-19 ASSESSMENT — LIFESTYLE VARIABLES
SMOKING_STATUS: YES
SMOKING_YEARS: 15

## 2022-05-19 NOTE — PROGRESS NOTES
HPI: per patient's questionnaire    EXAM: not applicable    Diagnoses and all orders for this visit:    1. Acute non-recurrent sinusitis of other sinus  Worsening  Patient reports COVID-19 test was negative  - azithromycin (ZITHROMAX) 250 MG tablet; 500 mg orally on day one followed by 250 mg daily on days two through five  Dispense: 6 tablet; Refill: 0  - benzonatate (TESSALON PERLES) 100 MG capsule; Take 1 capsule by mouth 3 times daily as needed for Cough  Dispense: 21 capsule; Refill: 0  - loratadine-pseudoephedrine (CLARITIN-D 12 HOUR) 5-120 MG per extended release tablet; Take 1 tablet by mouth 2 times daily as needed (congestion)  Dispense: 10 tablet; Refill: 0  - nicotine polacrilex (NICORETTE) 4 MG gum; Take 1 each by mouth as needed for Smoking cessation Chew 1 piece of gum every 1 to 2 hours (maximum: 24 pieces/day); Dispense: 110 each; Refill: 3    2. Acute bronchitis due to infection  Worsening  - azithromycin (ZITHROMAX) 250 MG tablet; 500 mg orally on day one followed by 250 mg daily on days two through five  Dispense: 6 tablet; Refill: 0  - benzonatate (TESSALON PERLES) 100 MG capsule; Take 1 capsule by mouth 3 times daily as needed for Cough  Dispense: 21 capsule; Refill: 0  - loratadine-pseudoephedrine (CLARITIN-D 12 HOUR) 5-120 MG per extended release tablet; Take 1 tablet by mouth 2 times daily as needed (congestion)  Dispense: 10 tablet; Refill: 0  - nicotine polacrilex (NICORETTE) 4 MG gum; Take 1 each by mouth as needed for Smoking cessation Chew 1 piece of gum every 1 to 2 hours (maximum: 24 pieces/day); Dispense: 110 each; Refill: 3    3. Smoker  Needs to quit smoking  - nicotine polacrilex (NICORETTE) 4 MG gum; Take 1 each by mouth as needed for Smoking cessation Chew 1 piece of gum every 1 to 2 hours (maximum: 24 pieces/day); Dispense: 110 each; Refill: 3      No orders of the defined types were placed in this encounter.         Patient was advised to contact PCP if symptoms worsen or failing to change as expected        11-20 minutes were spent on the digital evaluation and management of this patient.   Electronically signed by Mac Worley MD on 5/19/22 at  7:39 AM

## 2022-05-20 ENCOUNTER — TELEPHONE (OUTPATIENT)
Dept: FAMILY MEDICINE CLINIC | Age: 32
End: 2022-05-20

## 2022-05-20 NOTE — TELEPHONE ENCOUNTER
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 She states that they are overdue and she did not realize it was that time already. Is requesting appointment as soon as possible. Is there anywhere patient can be fit in? Please advise thank you.

## 2022-05-20 NOTE — TELEPHONE ENCOUNTER
With any provider, please schedule, ok virtual  Paperwork to be faxed on loaded in [employer] ahead of appointment

## 2022-05-27 ENCOUNTER — TELEMEDICINE (OUTPATIENT)
Dept: FAMILY MEDICINE CLINIC | Age: 32
End: 2022-05-27
Payer: MEDICARE

## 2022-05-27 DIAGNOSIS — F41.9 ANXIETY: ICD-10-CM

## 2022-05-27 DIAGNOSIS — F31.81 BIPOLAR II DISORDER (HCC): Primary | ICD-10-CM

## 2022-05-27 DIAGNOSIS — Z28.21 COVID-19 VACCINATION DECLINED: ICD-10-CM

## 2022-05-27 PROCEDURE — 99442 PR PHYS/QHP TELEPHONE EVALUATION 11-20 MIN: CPT | Performed by: FAMILY MEDICINE

## 2022-05-27 RX ORDER — FLUOXETINE 10 MG/1
10 CAPSULE ORAL DAILY
Qty: 90 CAPSULE | Refills: 3 | Status: SHIPPED | OUTPATIENT
Start: 2022-05-27 | End: 2022-06-23 | Stop reason: SDUPTHER

## 2022-05-27 ASSESSMENT — PATIENT HEALTH QUESTIONNAIRE - PHQ9
2. FEELING DOWN, DEPRESSED OR HOPELESS: 1
SUM OF ALL RESPONSES TO PHQ QUESTIONS 1-9: 1
SUM OF ALL RESPONSES TO PHQ9 QUESTIONS 1 & 2: 1
1. LITTLE INTEREST OR PLEASURE IN DOING THINGS: 0
SUM OF ALL RESPONSES TO PHQ QUESTIONS 1-9: 1

## 2022-05-27 NOTE — PROGRESS NOTES
Omari Haywood    Mobile Phone      Send Link Via Text    No text has been sent  Last text sent2022 3:02 PM  To:[phone]  Email      Send Link Via Email    No email has been sent  Last email sent2022 3:02 PM  To        [facility] contacted provider via telephone encounter . Patient requested office visit, was scheduled for virtual visit phone call , Deb Velez was unable to use doxy. me or MyChart. Deb Velez is a 27 y.o. female evaluated via telephone on  22    Omari Haywood (:  1990) is a 27 y.o. female,Established patient, here for evaluation of the following chief complaint(s): Anxiety and Manic Behavior      ASSESSMENT/PLAN:    1. Bipolar II disorder (HCC)  stable     -   start  FLUoxetine (PROZAC) 10 MG capsule; Take 1 capsule by mouth daily, Disp-90 capsule, R-3Normal  Buspar 10 mg BID  2. COVID-19 vaccination declined  Declines despite counseling  3. Anxiety  worsening  -   start  FLUoxetine (PROZAC) 10 MG capsule; Take 1 capsule by mouth daily, Disp-90 capsule, R-3Normal  Continue Buspar 10 mg BID        Natasha received counseling on the following healthy behaviors: nutrition, exercise, medication adherence, tobacco cessation and weight loss. Reviewed prior labs and health maintenance  Discussed use, benefit, and side effects of prescribed medications. Barriers to medication compliance addressed. Patient given educational materials - see patient instructions  Was a self-tracking handout given in paper form or via [employer]? No  All patient questions answered. Patient voiced understanding. The patient's past medical,surgical, social, and family history as well as her current medications and allergies were reviewed as documented in today's encounter. Medications, labs, diagnostic studies, consultations and follow-up as documented in this encounter. Return in about 3 months (around 2022) for Visit type PHYSICAL, VISION screen, PHQ9. .    Data Unavailable      Future Appointments   Date Time Provider Cecelia Garner   6/23/2022  2:45 PM Lennox Oregon, MD [facility]       Consent:  She is aware that that she may receive a bill for this telephone service, depending on her insurance coverage, and has provided verbal consent to proceed: Yes      Documentation and HPI:  I communicated with the patient about: Anxiety and Manic Behavior       Natasha reports: she needs FMLA renewal for her son with Down's sdr.   She needs to take care of her son with Down syndrome who is nonverbal, he has multiple handicaps, some days are better, some are worse at school and she needs to pick him up earlier sometimes, she needs to take him to the doctors, she needs to stay with him when sick or when not having a good day at school. I did refer her to Orlin Yadav before for bipolar disorder and anxiety. She is under a lot a lot of stress, she works and has child with Down sdr.     Currently taking Buspar only which helps. PHQ-2 Over the past 2 weeks, how often have you been bothered by any of the following problems? Little interest or pleasure in doing things: Not at all  Feeling down, depressed, or hopeless: Several days  PHQ-2 Score: 1  PHQ-9 Over the past 2 weeks, how often have you been bothered by any of the following problems? PHQ-9 Total Score: 1  PHQ-9 Total Score: 1        PHQ Scores 5/27/2022 1/6/2022 8/11/2021 5/19/2021 9/24/2020 8/26/2020 9/24/2019   PHQ2 Score 1 1 0 2 0 0 0   PHQ9 Score 1 1 0 2 0 0 0     The patient's past medical, surgical, social, and family history as well as her current medications and allergies were reviewed as documented in today's encounter. Prior to Visit Medications    Medication Sig Taking?  Authorizing Provider   loratadine-pseudoephedrine (CLARITIN-D 12 HOUR) 5-120 MG per extended release tablet Take 1 tablet by mouth 2 times daily as needed (congestion) Yes Lennox Oregon, MD   nicotine polacrilex (NICORETTE) 4 MG gum Take 1 each by mouth as needed for Smoking cessation Chew 1 piece of gum every 1 to 2 hours (maximum: 24 pieces/day); Yes Arlin Gastelum MD   busPIRone (BUSPAR) 10 MG tablet TAKE 1 TABLET BY MOUTH 2 TIMES DAILY Yes Arlin Gastelum MD   omeprazole (PRILOSEC) 40 MG delayed release capsule TAKE 1 CAPSULE BY MOUTH DAILY DECREASE TO ONCE A DAY Yes Arlin Gastelum MD   cetirizine (ZYRTEC) 10 MG tablet TAKE 1 TABLET BY MOUTH DAILY Yes Arlin Gastelum MD   guaiFENesin (MUCINEX) 600 MG extended release tablet Take 1 tablet by mouth 2 times daily Yes Arlin Gastelum MD   albuterol sulfate HFA (VENTOLIN HFA) 108 (90 Base) MCG/ACT inhaler Inhale 2 puffs into the lungs 4 times daily as needed for Wheezing Yes Arlin Gastelum MD   Cholecalciferol (VITAMIN D3) 50 MCG (2000 UT) TABS TAKE 1 TABLET BY MOUTH DAILY Yes Arlin Gastelum MD   fluticasone (FLONASE) 50 MCG/ACT nasal spray 2 sprays by Nasal route daily Yes Willy Arora MD   butalbital-acetaminophen-caffeine (FIORICET, ESGIC) -40 MG per tablet Take 1 tablet by mouth every 6 hours as needed for Headaches or Migraine MAX 3 DAYS/WEEK Yes Arlin Gastelum MD   Masks (CLEVER CHOICE FACE MASK) [facility] USE NEW FACE MASK EVERYDAY WHEN PATIENT GO OUTSIDE OF HOME DUE  [address] Yes Arlin Gastelum MD   Elastic Bandages & Supports (WRIST SPLINT/NEOPRENE RIGHT MD) [facility] Needs right wrist splint Yes Arlin Gastelum MD   MAPAP 500 MG tablet TAKE 1 TABLET BY MOUTH EVERY 6 HOURS AS NEEDED FOR PAIN Yes Arlin Gastelum MD       -vital signs stable and within normal limits except Obesity per BMI.    Patient-Reported Vitals 5/27/2022   Patient-Reported Weight 188 lbs   Patient-Reported Height 5 ft 5 in   Patient-Reported Systolic -   Patient-Reported Diastolic -   Patient-Reported Pulse -   Patient-Reported Temperature -   Patient-Reported SpO2 -          Orders Placed This Encounter   Medications    FLUoxetine (PROZAC) 10 MG capsule

## 2022-05-29 NOTE — PATIENT INSTRUCTIONS
New Updates for [facility] MyChart/ [facility] ([facility] BJ    Thank you for choosing US to give you the best care! [facility] ([facility]) is always trying to think of new ways to help their patients. We are asking all patients to try out the new digital registration that is now available through your [facility] account or the new BJ, [facility] ([facility]). Via the bj you're now able to update your personal and registration information prior to your upcoming appointment. This will save you time once you arrive at the office to check-in, not to mention your information remains safe!! Many other perks come from signing up for an account, such as:   Requesting refills   Scheduling an appointment   Completing an [address] Sending a message to the office/provider   Having access to your medication list   Paying your bill/copay prior to your appointment   Scheduling your yearly mammogram   Review your test results    If you are not familiar with [facility] or the [facility] ([facility] BJ, please ask one of us and we will be happy to answer any questions or help you set-up your account.       Your [facility] office,  Debbie

## 2022-06-23 ENCOUNTER — OFFICE VISIT (OUTPATIENT)
Dept: FAMILY MEDICINE CLINIC | Age: 32
End: 2022-06-23
Payer: MEDICARE

## 2022-06-23 VITALS
HEIGHT: 65 IN | OXYGEN SATURATION: 98 % | WEIGHT: 186 LBS | HEART RATE: 89 BPM | SYSTOLIC BLOOD PRESSURE: 120 MMHG | TEMPERATURE: 97 F | DIASTOLIC BLOOD PRESSURE: 80 MMHG | BODY MASS INDEX: 30.99 KG/M2

## 2022-06-23 DIAGNOSIS — F31.81 BIPOLAR II DISORDER (HCC): ICD-10-CM

## 2022-06-23 DIAGNOSIS — Z00.01 ENCOUNTER FOR WELL ADULT EXAM WITH ABNORMAL FINDINGS: Primary | ICD-10-CM

## 2022-06-23 DIAGNOSIS — R73.9 HYPERGLYCEMIA: ICD-10-CM

## 2022-06-23 DIAGNOSIS — Z13.6 SCREENING FOR CARDIOVASCULAR CONDITION: ICD-10-CM

## 2022-06-23 DIAGNOSIS — G44.229 CHRONIC TENSION-TYPE HEADACHE, NOT INTRACTABLE: ICD-10-CM

## 2022-06-23 DIAGNOSIS — E66.9 OBESITY (BMI 30.0-34.9): ICD-10-CM

## 2022-06-23 PROCEDURE — G8417 CALC BMI ABV UP PARAM F/U: HCPCS | Performed by: FAMILY MEDICINE

## 2022-06-23 PROCEDURE — 99395 PREV VISIT EST AGE 18-39: CPT | Performed by: FAMILY MEDICINE

## 2022-06-23 PROCEDURE — G8427 DOCREV CUR MEDS BY ELIG CLIN: HCPCS | Performed by: FAMILY MEDICINE

## 2022-06-23 PROCEDURE — 99213 OFFICE O/P EST LOW 20 MIN: CPT | Performed by: FAMILY MEDICINE

## 2022-06-23 PROCEDURE — 4004F PT TOBACCO SCREEN RCVD TLK: CPT | Performed by: FAMILY MEDICINE

## 2022-06-23 RX ORDER — BUTALBITAL, ACETAMINOPHEN AND CAFFEINE 50; 325; 40 MG/1; MG/1; MG/1
1 TABLET ORAL EVERY 6 HOURS PRN
Qty: 30 TABLET | Refills: 0 | Status: SHIPPED | OUTPATIENT
Start: 2022-06-23

## 2022-06-23 RX ORDER — FLUOXETINE 10 MG/1
10 CAPSULE ORAL DAILY
Qty: 90 CAPSULE | Refills: 3 | Status: SHIPPED | OUTPATIENT
Start: 2022-06-23

## 2022-06-23 RX ORDER — PHENTERMINE HYDROCHLORIDE 37.5 MG/1
37.5 TABLET ORAL
Qty: 30 TABLET | Refills: 0 | Status: SHIPPED | OUTPATIENT
Start: 2022-06-23 | End: 2022-07-19 | Stop reason: SDUPTHER

## 2022-06-23 SDOH — ECONOMIC STABILITY: HOUSING INSECURITY
IN THE LAST 12 MONTHS, WAS THERE A TIME WHEN YOU DID NOT HAVE A STEADY PLACE TO SLEEP OR SLEPT IN A SHELTER (INCLUDING NOW)?: NO

## 2022-06-23 SDOH — ECONOMIC STABILITY: TRANSPORTATION INSECURITY
IN THE PAST 12 MONTHS, HAS THE LACK OF TRANSPORTATION KEPT YOU FROM MEDICAL APPOINTMENTS OR FROM GETTING MEDICATIONS?: NO

## 2022-06-23 SDOH — ECONOMIC STABILITY: FOOD INSECURITY: WITHIN THE PAST 12 MONTHS, YOU WORRIED THAT YOUR FOOD WOULD RUN OUT BEFORE YOU GOT MONEY TO BUY MORE.: NEVER TRUE

## 2022-06-23 SDOH — ECONOMIC STABILITY: INCOME INSECURITY: IN THE LAST 12 MONTHS, WAS THERE A TIME WHEN YOU WERE NOT ABLE TO PAY THE MORTGAGE OR RENT ON TIME?: NO

## 2022-06-23 SDOH — ECONOMIC STABILITY: FOOD INSECURITY: WITHIN THE PAST 12 MONTHS, THE FOOD YOU BOUGHT JUST DIDN'T LAST AND YOU DIDN'T HAVE MONEY TO GET MORE.: NEVER TRUE

## 2022-06-23 SDOH — ECONOMIC STABILITY: TRANSPORTATION INSECURITY
IN THE PAST 12 MONTHS, HAS LACK OF TRANSPORTATION KEPT YOU FROM MEETINGS, WORK, OR FROM GETTING THINGS NEEDED FOR DAILY LIVING?: NO

## 2022-06-23 ASSESSMENT — ENCOUNTER SYMPTOMS
CHEST TIGHTNESS: 0
BACK PAIN: 0
ABDOMINAL PAIN: 0
NAUSEA: 0
ABDOMINAL DISTENTION: 0
COUGH: 0
BLOOD IN STOOL: 0
SHORTNESS OF BREATH: 0
DIARRHEA: 0
VOMITING: 0
WHEEZING: 0
CONSTIPATION: 0

## 2022-06-23 ASSESSMENT — PATIENT HEALTH QUESTIONNAIRE - PHQ9
SUM OF ALL RESPONSES TO PHQ QUESTIONS 1-9: 0
10. IF YOU CHECKED OFF ANY PROBLEMS, HOW DIFFICULT HAVE THESE PROBLEMS MADE IT FOR YOU TO DO YOUR WORK, TAKE CARE OF THINGS AT HOME, OR GET ALONG WITH OTHER PEOPLE: 0
4. FEELING TIRED OR HAVING LITTLE ENERGY: 0
SUM OF ALL RESPONSES TO PHQ QUESTIONS 1-9: 0
5. POOR APPETITE OR OVEREATING: 0
7. TROUBLE CONCENTRATING ON THINGS, SUCH AS READING THE NEWSPAPER OR WATCHING TELEVISION: 0
6. FEELING BAD ABOUT YOURSELF - OR THAT YOU ARE A FAILURE OR HAVE LET YOURSELF OR YOUR FAMILY DOWN: 0
1. LITTLE INTEREST OR PLEASURE IN DOING THINGS: 0
SUM OF ALL RESPONSES TO PHQ QUESTIONS 1-9: 0
8. MOVING OR SPEAKING SO SLOWLY THAT OTHER PEOPLE COULD HAVE NOTICED. OR THE OPPOSITE, BEING SO FIGETY OR RESTLESS THAT YOU HAVE BEEN MOVING AROUND A LOT MORE THAN USUAL: 0
2. FEELING DOWN, DEPRESSED OR HOPELESS: 0
3. TROUBLE FALLING OR STAYING ASLEEP: 0
9. THOUGHTS THAT YOU WOULD BE BETTER OFF DEAD, OR OF HURTING YOURSELF: 0
SUM OF ALL RESPONSES TO PHQ QUESTIONS 1-9: 0
SUM OF ALL RESPONSES TO PHQ9 QUESTIONS 1 & 2: 0

## 2022-06-23 ASSESSMENT — SOCIAL DETERMINANTS OF HEALTH (SDOH): HOW HARD IS IT FOR YOU TO PAY FOR THE VERY BASICS LIKE FOOD, HOUSING, MEDICAL CARE, AND HEATING?: NOT HARD AT ALL

## 2022-06-23 ASSESSMENT — VISUAL ACUITY
OS_CC: 20/10
OD_CC: 20/13

## 2022-06-23 NOTE — PROGRESS NOTES
Visit Information    Have you changed or started any medications since your last visit including any over-the-counter medicines, vitamins, or herbal medicines? no   Are you having any side effects from any of your medications? -  no  Have you stopped taking any of your medications? Is so, why? -  no    Have you seen any other physician or provider since your last visit? No  Have you had any other diagnostic tests since your last visit? No  Have you been seen in the emergency room and/or had an admission to a hospital since we last saw you? No  Have you had your routine dental cleaning in the past 6 months? yes     Have you activated your [employer] account? If not, what are your barriers?  Yes     Patient Care Team:  Zuri Vides MD as PCP - General  Zuri Vides MD as PCP - [facility]  Ruy Rios DO as Consulting Physician (Obstetrics & Gynecology)  Rick German MD as Consulting Physician (Urology)  Gregor Hope MD as Consulting Physician (Gastroenterology)  Jamia Khan MD as Surgeon (Cardiology)    Medical History Review  Past Medical, Family, and Social History reviewed and does contribute to the patient presenting condition    Health Maintenance   Topic Date Due    Pneumococcal 0-64 years Vaccine (2 - PCV) 02/28/2018    COVID-19 Vaccine (3 - Booster for [place] series) 10/19/2021    A1C test (Diabetic or Prediabetic)  05/19/2022    Flu vaccine (Season Ended) 09/01/2022    Depression Monitoring  05/27/2023    DTaP/Tdap/Td vaccine (2 - Td or Tdap) 03/19/2029    Hepatitis C screen  Completed    HIV screen  Completed    Hepatitis A vaccine  Aged Out    Hepatitis B vaccine  Aged Out    Hib vaccine  Aged Out    Meningococcal (ACWY) vaccine  Aged Out    Varicella vaccine  Discontinued

## 2022-06-23 NOTE — PATIENT INSTRUCTIONS

## 2022-06-23 NOTE — PROGRESS NOTES
2022      Natasha Haywood (:  1990) is a 27 y.o. female, here for a preventive medicine evaluation, Annual Exam, Weight Management (adipex would like to discuss ), and Other (declined the pneumonia vaccine today will think about it next time )   . ASSESSMENT/PLAN:    1. Encounter for well adult exam with abnormal findings    Low carb, low fat diet, increase fruits and vegetables, and exercise 4-5 times a week 30-40 minutes a day, or walk 1-2 hours per day, or wear a pedometer and get at least 10,000 steps per day. Dental exam 2-3 times /year advised. Immunizations reviewed. Discussed to get the COVID-19 vaccine booster, and then Prevnar, she declines at this time    Health Maintenance reviewed   Smoking cessation counseling given, she is working hard to quit smoking at this time    Annual eye exam advised. -     Lipid, Fasting; Future  2. Hyperglycemia  To rule out prediabetes  Low carb, low fat diet, increase fruits and vegetables, and exercise 4-5 times a week 30-40 minutes a day, or walk 1-2 hours per day, or wear a pedometer and get at least 10,000 steps per day. -     Hemoglobin A1C; Future  -     [place] OFFICE/OUTPATIENT ESTABLISHED LOW MDM 20-29 MIN  3. Bipolar II disorder (HCC)  Improved  Continue Prozac, and monitoring, relaxation techniques discussed  -     FLUoxetine (PROZAC) 10 MG capsule; Take 1 capsule by mouth daily, Disp-90 capsule, R-3Normal  -     Comprehensive Metabolic Panel; Future  -     CBC; Future  -     TSH; Future  -     [place] OFFICE/OUTPATIENT ESTABLISHED LOW MDM 20-29 MIN  4. Chronic tension-type headache, not intractable  Intermittent  Relaxation techniques discussed, she is up-to-date with eye exam  -     butalbital-acetaminophen-caffeine (FIORICET, ESGIC) -40 MG per tablet; Take 1 tablet by mouth every 6 hours as needed for Headaches or Migraine MAX 3 DAYS/WEEK, Disp-30 tablet, R-0Normal  -     [place] OFFICE/OUTPATIENT ESTABLISHED LOW MDM 20-29 MIN  5. Screening for cardiovascular condition  -     Lipid, Fasting; Future  6. Obesity (BMI 30.0-34. 9)  Improved  Wt Readings from Last 3 Encounters:   06/23/22 186 lb (84.4 kg)   05/19/21 188 lb (85.3 kg)   12/25/20 178 lb (80.7 kg)     We will update the blood work, she would benefit to lose weight to improve her health    -     Comprehensive Metabolic Panel; Future  -     Lipid, Fasting; Future  -     phentermine (ADIPEX-P) 37.5 MG tablet; Take 1 tablet by mouth every morning (before breakfast) for 30 days. , Disp-30 tablet, R-0Normal  -     CBC; Future  -     TSH; Future  -     [place] OFFICE/OUTPATIENT ESTABLISHED LOW MDM 20-29 MIN  Patient was asked about her current diet and exercise habits, and personalized advice was provided regarding recommended lifestyle changes. Patient's comorbid health conditions associated with elevated BMI were discussed, including hyperglycemia and mood disorder, as well as the likely benefits of weight loss. Based upon patient's motivation to change her behavior, the following plan was agreed upon to work toward a weight loss goal of 1-2 pounds per week: low carbohydrate diet, exercise for at least 30 minutes 4-5 days per week and medication prescribed: Adipex. Educational materials for  weight loss were provided. Patient will follow-up in 1 month(s) with PCP. Provider spent 20 minutes counseling patient. Controlled Substance Monitoring:    Acute and Chronic Pain Monitoring:   RX Monitoring 6/23/2022   Attestation -   Periodic Controlled Substance Monitoring Possible medication side effects, risk of tolerance/dependence & alternative treatments discussed. ;No signs of potential drug abuse or diversion identified. ;Assessed functional status. Chronic Pain > 50 MEDD -           Natasha received counseling on the following healthy behaviors: nutrition, exercise, medication adherence, tobacco cessation and weight loss.   Reviewed prior labs and health maintenance  Discussed use, benefit, and side effects of prescribed medications. Barriers to medication compliance addressed. Patient given educational materials - see patient instructions  All patient questions answered. Patient voiced understanding. The patient's past medical, surgical, social, and family history as well as her  current medications and allergies were reviewed as documented in today's encounter. Medications, labs, diagnostic studies, consultations and follow-up as documented in thisencounter. Return in 1 year (on 6/23/2023) for Annual, Visit type PHYSICAL, VISION screen, PHQ9. .    2 appt for Adipex BP and WT be available at appointment     Future Appointments   Date Time Provider Cecelia Garner   7/19/2022  1:15 PM [facility]   [facility]16[facility]2022  1:00 [facility]   6/23/2023  2:00 PM [facility]           Patient Active Problem List   Diagnosis    Smoker    S/P GONZALO, RSO, appendectomy 7/7/14    Bipolar II disorder ([facility] [address]    Anxiety    Obesity (BMI 30.0-34. 9)    Cyst of left kidney    Fatigue    History of total abdominal hysterectomy    Chronic bilateral low back pain without sciatica    Chronic tension-type headache, not intractable    Smokes and motivated to quit    Hyperglycemia    Incomplete RBBB    Gastroesophageal reflux disease without esophagitis    Seasonal allergic rhinitis       Prior to Visit Medications    Medication Sig Taking?  Authorizing Provider   FLUoxetine (PROZAC) 10 MG capsule Take 1 capsule by mouth daily Yes Zuhair Cuello MD   cetirizine (ZYRTEC) 10 MG tablet TAKE 1 TABLET BY MOUTH DAILY Yes Zuhair Cuello MD   Cholecalciferol (VITAMIN D3) 50 MCG (2000 UT) TABS TAKE 1 TABLET BY MOUTH DAILY Yes Zuhair Cuello MD   butalbital-acetaminophen-caffeine (FIORICET, ESGIC) -40 MG per tablet Take 1 tablet by mouth every 6 hours as needed for Headaches or Migraine MAX 3 DAYS/WEEK Yes Ruby Rebeka Walters MD   Masks (CLEVER CHOICE FACE MASK) [facility] USE NEW FACE MASK EVERYDAY WHEN PATIENT GO OUTSIDE OF HOME DUE TO COVID PANDEMIC Yes Cira Drew MD   Elastic Bandages & Supports (WRIST SPLINT/NEOPRENE RIGHT MD) [facility] Needs right wrist splint Yes Cira Drew MD   loratadine-pseudoephedrine (CLARITIN-D 12 HOUR) 5-120 MG per extended release tablet Take 1 tablet by mouth 2 times daily as needed (congestion)  Patient not taking: Reported on 6/23/2022  Cira Drew MD   omeprazole (PRILOSEC) 40 MG delayed release capsule TAKE 1 CAPSULE BY MOUTH DAILY DECREASE TO ONCE A DAY  Patient not taking: Reported on 6/23/2022  Cira Drew MD   guaiFENesin (MUCINEX) 600 MG extended release tablet Take 1 tablet by mouth 2 times daily  Patient not taking: Reported on 6/23/2022  Cira Drew MD   albuterol sulfate HFA (VENTOLIN HFA) 108 (90 Base) MCG/ACT inhaler Inhale 2 puffs into the lungs 4 times daily as needed for Wheezing  Patient not taking: Reported on 6/23/2022  Cira Drew MD   fluticasone (FLONASE) 50 MCG/ACT nasal spray 2 sprays by Nasal route daily  Patient not taking: Reported on 6/23/2022  Dion Robison MD        Allergies   Allergen Reactions    Shellfish-Derived Products     Levaquin [Levofloxacin In D5w]      Tendon pain       Past Medical History:   Diagnosis Date    Anxiety     ASCUS (atypical squamous cells of undetermined significance) on Pap smear 8/1/07, 1/26/10, 6/13/11, 7/10/12, 4/24/13    B[facility] II disorder ([facility] [address]     Cervical high risk HPV (human papillomavirus) test positive 1/26/10, 6/13/11, 7/10/12, 4/24/13    Chronic back pain     Chronic kidney disease     Chronic pelvic pain in female     BRUCE II (cervical intraepithelial neoplasia II) 7/22/13    on LEEP    COVID-19 virus infection 1/9/2022    Cyst of left kidney     Depression     Dysmenorrhea 6/27/2013    GABBY (generalized anxiety disorder) 3/11/2019    Gastroesophageal reflux disease without esophagitis 5/24/2019    Hernia     History of total abdominal hysterectomy 7/7/14    RSO    HPV (human papilloma virus) infection 10/27/2011    Laryngeal Papillomatosis counseling and Route reviewed     Incomplete RBBB 1/23/2019    Intractable headache r/o meningitis 10/16/2014    Menopausal hot flushes 10/14/2014    Obesity 3/5/2019    Obesity (BMI 30.0-34.9) 10/8/2014    Ovarian cyst rupture     was hospitalized for infection 2014 (April?)    Ovarian cyst, bilateral     Ovarian cyst, follicular 5/6/3718    SMALL CYST. Right side, 4cm, +doppler flow Tumor markers negative     PTSD (post-traumatic stress disorder)     Seasonal allergic rhinitis 5/24/2019    Seasonal allergic rhinitis     Tobacco abuse     Tobacco abuse     Urinary retention with incomplete bladder emptying     HX    Wears glasses     night vision issues       Past Surgical History:   Procedure Laterality Date    APPENDECTOMY  7/7/14    Dr Sarahy Kellogg  7/22/13    [facility] with LEEP    HYSTERECTOMY (CERVIX STATUS UNKNOWN)  7/7/14    GONZALO with RSO and Appendectomy, pathology benign    HYSTEROSCOPY  3/5/2012    LAPAROSCOPY  05-28-14    diagnostic w/pelvic washing    LEEP  7/22/13    BRUCE I-II, neg margins, done with ablation    TUBAL LIGATION  2012    [facility]ure, also with a hysteroscopy    UPPER GASTROINTESTINAL ENDOSCOPY N/A 12/6/2019    EGD BIOPSY performed by Lana Arcos MD at [facility]         .   Social History     Tobacco Use    Smoking status: Current Every Day Smoker     Packs/day: 0.50     Years: 15.00     Pack years: 7.50     Types: Cigarettes    Smokeless tobacco: Never Used   Vaping Use    Vaping Use: Not on file   Substance Use Topics    Alcohol use: Not Currently     Alcohol/week: 0.0 standard drinks     Comment: occasionally    Drug use: No       Family History   Problem Relation Age of Onset    Diabetes Maternal Grandmother     Heart Disease Maternal Grandmother     Heart Disease Maternal Grandfather     Hypertension Father     Diabetes Mother     Diabetes Sister     Down Syndrome Son     Other Son     Ovarian Cancer Paternal Grandmother         >72 yo    Breast Cancer Paternal Grandmother         >49 yo    Urolithiasis Brother        ADVANCE DIRECTIVE: N, <no information>    SUBJECTIVE / Rubin Marques is here for Annual Exam, Weight Management (adipex would like to discuss ), and Other (declined the pneumonia vaccine today will think about it next time )     Wants to lose weight. Natasha Haywood has made a substantial good daniel effort to lose weight in a regimen for weight reduction based on caloric restriction, nutritional changes, and exercise  Natasha Haywood reports she did: Change her diet, drinking only water, cutting down the portions, being very active, and exercising  Patient says she was able to lose some weight but is harder for her to lose even more weight  Contraindications to controlled substance anorexiant: NONE       Natasha Hdezsamuel reports no  use of illegal drug substances  Natasha Haywood reports alcohol use of : None    Contraceptive method: hysterectomy     Patient informed that when prescribed a controlled substance for weight loss, the provider is required by law to see the patient for an appointment every thirty days. This is neccessary to record the weight and blood pressure and to assess patient's efforts to lose weight, and to ensure there are no contraindications or adverse effects.    Patient advised contraception during the time of taking this medication, advised no alcohol use during this time    OARRS done today and okay  Plan: diet, exercise and start Adipex    blood pressure is normal.    BP Readings from Last 3 Encounters:   06/23/22 120/80   03/28/22 134/88   05/19/21 130/86        Pulse is Normal.  Pulse Readings from Last 3 Encounters:   06/23/22 89   03/28/22 76   05/19/21 89     She has lost on her own 2 pounds in 1 year, she says per her scale she has lost more weight this year but her weight has been fluctuating  Wt Readings from Last 3 Encounters:   06/23/22 186 lb (84.4 kg)   05/19/21 188 lb (85.3 kg)   12/25/20 178 lb (80.7 kg)       Obesity per BMI  Body mass index is 30.95 kg/m². Patient has bipolar disorder type II and anxiety, taking Prozac has been helping    Patient reports that last time when she was on Adipex, did not make her mood or anxiety worse. Patient says she did not have any side effects from Adipex last year, patient says improving her weight improves her mood as well.   09/28/2020 09/24/2020   1  Phentermine 37.5 Mg Tablet   30.00  30  Fl Chi        Headaches    Patient thinks her headaches are related to stress every, same location usually, not more severe, she gets a headache every 2 weeks or less. Stress is related mostly to work and worsening health of her . Urinary symptoms: no    Mild hyperglycemia in the past  A1c 5.2 in the past no prediabetes. Denies increased appetite, thirst or polyuria. Lab Results   Component Value Date    LABA1C 5.2 05/19/2021    LABA1C 5.4 11/12/2019    LABA1C 5.4 10/30/2018       Sexually active: Yes     last pap: was normal  The patient has history of abnormal PAP  Had hysterectomy    Last mammogram: N/A  The patient has  family history of breast cancer    Wears seatbelts: yes     Regular exercise: yes  Ever been transfused or tattooed?: yes  The patient reports that domestic violence in her life is absent. Last eye exam: up to date: Yes    Abnormal visual screening. Natasha CARRASQUILLO Yobany  reports she is up-to-date with her eye exam.    She does have glasses      Visual Acuity Screening    Right eye Left eye Both eyes   Without correction:      With correction: 20/13 20/10 20/10       Hearing:normal :Yes    Last dental exam and preventative dental cleaning: up to date : Yes    Nutritional assessment:     Body mass index is 30.95 kg/m². Obesity. Healthful diet and Physical activity counseling to prevent CVD- low carb, low fat diet, increase fruits and vegetables, and exercise 4-5 times a day 30-40minutes a day discussed    Nicotine dependence. yes   Smoker, counseling given to quit smoking. patient reports she has cut down a lot on smoking, she says she only smokes \"when super stressed out 1 cigarettes/-2-3 a week\". Praise given, advised to find other ways to cope with the stress and anxiety like taking a deep breath, listening to music, she says she will try. She is also trying to make her  quit smoking. Ready to quit: No  Counseling given: Yes      Alcohol use: no    Immunization History   Administered Date(s) Administered    COVID-19, Moderna, Primary or Immunocompromised, PF, 100mcg/0.5mL 04/21/2021, 05/19/2021    Influenza Vaccine, unspecified formulation 11/05/2017    Influenza Virus Vaccine 09/03/2014, 11/05/2017, 10/19/2018, 09/16/2019    Influenza Whole 09/03/2014    Influenza, Nabeel Reasons, IM, (6 mo and older Fluzone, Flulaval, Fluarix and 3 yrs and older Afluria) 10/19/2018, 09/18/2019    PPD Test 12/03/2019    Pneumococcal Polysaccharide ([id number]) 02/28/2017    Tdap (Boostrix, Adacel) 03/19/2019       COVID-19 vaccine:  No: Patient says she will get the booster at the pharmacy       Tdap one time, then Td every 10 years-up to date:  Yes    Influenza annually-up to date:  Yes    PPSV 23 in all adults 19-65 yo with chronic conditions,smokers, alcoholism,  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      Colon cancer screening recommended at 39years old    The patient has NO family history of colon cancer    Blood pressure is normal.  BP Readings from Last 3 Encounters:   06/23/22 120/80   03/28/22 134/88   05/19/21 130/86       Lipid screening -never done  No results found for: CHOL  No results found for: TRIG  No results found for: HDL  No results found for: LDLCHOLESTEROL, LDLCALC  No results found for: CHOLHDLRATIO    Cardiovascular risk is: Likely low due to her age  The ASCVD Risk score (Marek Velazquez, et al., 2013) failed to calculate for the following reasons: The 2013 ASCVD risk score is only valid for ages 36 to 78    Hepatitis C screening-nonreactive  Lab Results   Component Value Date    HEPCAB NONREACTIVE 05/02/2017       Bipolar disorder, taking Prozac, tolerated well, denies side effects  Patient says since she has cut down the days working, she has been dealing with stress much better, and having more time to spend with her children including with her teenager son who has Down syndrome      PHQ-2 Over the past 2 weeks, how often have you been bothered by any of the following problems? Little interest or pleasure in doing things: Not at all  Feeling down, depressed, or hopeless: Not at all  PHQ-2 Score: 0  PHQ-9 Over the past 2 weeks, how often have you been bothered by any of the following problems? Trouble falling or staying asleep, or sleeping too much: Not at all  Feeling tired or having little energy: Not at all  Poor appetite or overeating: Not at all  Feeling bad about yourself - or that you are a failure or have let yourself or your family down: Not at all  Trouble concentrating on things, such as reading the newspaper or watching television: Not at all  Moving or speaking so slowly that other people could have noticed.  Or the opposite - being so fidgety or restless that you have been moving around a lot more than usual: Not at all  Thoughts that you would be better off dead, or of hurting yourself in some way: Not at all  If you checked off any problems, how difficult have these problems made it for you to do your work, take care of things at home, or get along with other people?: Not difficult at all  PHQ-9 Total Score: 0  PHQ-9 Total Score: 0      PHQ Scores 6/23/2022 5/27/2022 1/6/2022 8/11/2021 5/19/2021 9/24/2020 8/26/2020   PHQ2 Score 0 1 1 0 2 0 0   PHQ9 Score 0 1 1 0 2 0 0       Health Maintenance   Topic Date Due    Pneumococcal 0-64 years Vaccine (2 - PCV) 02/28/2018    COVID-19 Vaccine (3 - Booster for Moderna series) 10/19/2021    A1C test (Diabetic or Prediabetic)  05/19/2022    Flu vaccine (Season Ended) 09/01/2022    Depression Monitoring  06/23/2023    DTaP/Tdap/Td vaccine (2 - Td or Tdap) 03/19/2029    Hepatitis C screen  Completed    HIV screen  Completed    Hepatitis A vaccine  Aged Out    Hepatitis B vaccine  Aged Out    Hib vaccine  Aged Out    Meningococcal (ACWY) vaccine  Aged Out    Varicella vaccine  Discontinued       -rest of complaints with corresponding details per ROS    Review of Systems   Constitutional: Negative for activity change, appetite change, chills, diaphoresis, fatigue, fever and unexpected weight change. HENT: Negative for congestion, dental problem and hearing loss. Eyes: Positive for visual disturbance (has glasses, stable, chronic). Respiratory: Negative for cough, chest tightness, shortness of breath and wheezing. Cardiovascular: Negative for chest pain, palpitations and leg swelling. Gastrointestinal: Negative for abdominal distention, abdominal pain, blood in stool, constipation, diarrhea, nausea and vomiting. Endocrine: Negative for cold intolerance, heat intolerance, polydipsia, polyphagia and polyuria. Genitourinary: Negative for difficulty urinating, dysuria, frequency, urgency and vaginal pain. Musculoskeletal: Negative for arthralgias, back pain and myalgias. Skin: Negative for rash. Neurological: Negative for dizziness, weakness, numbness and headaches (none now). Hematological: Does not bruise/bleed easily. Psychiatric/Behavioral: Negative for dysphoric mood and sleep disturbance.  The patient is nervous/anxious.          -vital signs stable and within normal limits except obesity per BMI  /80   Pulse 89   Temp 97 °F (36.1 °C)   Ht 5' 5\" (1.651 m)   Wt 186 lb (84.4 kg)   LMP 06/21/2013   SpO2 98%   BMI 30.95 kg/m²   Vitals:    06/23/22 1435   BP: 120/80   Pulse: 89   Temp: 97 °F (36.1 °C)   SpO2: 98%   Weight: 186 lb (84.4 kg)   Height: 5' 5\" (1.651 m)     Estimated body mass index is 30.95 kg/m² as calculated from the following:    Height as of this encounter: 5' 5\" (1.651 m). Weight as of this encounter: 186 lb (84.4 kg). Physical Exam  Vitals and nursing note reviewed. Constitutional:       General: She is not in acute distress. Appearance: Normal appearance. She is well-developed. She is obese. She is not diaphoretic. HENT:      Head: Normocephalic and atraumatic. Right Ear: Hearing, tympanic membrane, ear canal and external ear normal.      Left Ear: Hearing, tympanic membrane, ear canal and external ear normal.      Nose: No mucosal edema. Mouth/Throat:      Comments: I did not examine the mouth due to coronavirus pandemic and wearing masks. Eyes:      General: Lids are normal. No scleral icterus. Right eye: No discharge. Left eye: No discharge. Extraocular Movements: Extraocular movements intact. Conjunctiva/sclera: Conjunctivae normal.   Neck:      Thyroid: No thyromegaly. Cardiovascular:      Rate and Rhythm: Normal rate and regular rhythm. Heart sounds: Normal heart sounds. No murmur heard. Pulmonary:      Effort: Pulmonary effort is normal. No respiratory distress. Breath sounds: Normal breath sounds. No wheezing or rales. Chest:      Chest wall: No tenderness. Abdominal:      General: Bowel sounds are normal. There is no distension. Palpations: Abdomen is soft. There is no hepatomegaly or splenomegaly. Tenderness: There is no abdominal tenderness. Comments: Obese abdomen. Musculoskeletal:         General: No tenderness. Normal range of motion.       Cervical back: Normal range of motion and neck supple. Right lower leg: No edema. Left lower leg: No edema. Skin:     General: Skin is warm and dry. Capillary Refill: Capillary refill takes less than 2 seconds. Findings: No rash. Neurological:      Mental Status: She is alert and oriented to person, place, and time. Cranial Nerves: No cranial nerve deficit. Motor: No abnormal muscle tone. Gait: Gait normal.      Deep Tendon Reflexes: Reflexes normal.      Reflex Scores:       Patellar reflexes are 2+ on the right side and 2+ on the left side. Psychiatric:         Mood and Affect: Mood normal.         Behavior: Behavior normal.         Thought Content: Thought content normal.         Judgment: Judgment normal.         I personally reviewed testing with patient.   Mild hyperglycemia in the past but no prediabetes    Otherwise labs within normal limits      Lab Results   Component Value Date    WBC 10.6 01/08/2021    HGB 13.1 01/08/2021    HCT 37.3 01/08/2021    MCV 93.4 01/08/2021     01/08/2021       Lab Results   Component Value Date     01/08/2021    K 4.2 01/08/2021     01/08/2021    CO2 26 01/08/2021    BUN 12 01/08/2021    CREATININE 0.67 01/08/2021    GLUCOSE 128 01/08/2021    CALCIUM 9.4 01/08/2021        Lab Results   Component Value Date    ALT 25 01/08/2021    AST 20 01/08/2021    ALKPHOS 78 01/08/2021    BILITOT 0.37 01/08/2021       Lab Results   Component Value Date    TSH 1.30 01/18/2019       No results found for: LDLCALC, LDLCHOLESTEROL, LDLDIRECT    Lab Results   Component Value Date    LABA1C 5.2 05/19/2021       No results found for: [id number]    No results found for: FOLATE    No results found for: IRON, TIBC, FERRITIN    No results found for: VITD25      Orders Placed This Encounter   Medications    butalbital-acetaminophen-caffeine (FIORICET, ESGIC) -40 MG per tablet     Sig: Take 1 tablet by mouth every 6 hours as needed for Headaches or Migraine MAX 3 DAYS/WEEK     Dispense:  30 tablet     Refill:  0    FLUoxetine (PROZAC) 10 MG capsule     Sig: Take 1 capsule by mouth daily     Dispense:  90 capsule     Refill:  3    phentermine (ADIPEX-P) 37.5 MG tablet     Sig: Take 1 tablet by mouth every morning (before breakfast) for 30 days. Dispense:  30 tablet     Refill:  0         Medications Discontinued During This Encounter   Medication Reason    MAPAP 500 MG tablet Therapy completed    busPIRone (BUSPAR) 10 MG tablet Therapy completed    nicotine polacrilex (NICORETTE) 4 MG gum Therapy completed    butalbital-acetaminophen-caffeine (FIORICET, ESGIC) -40 MG per tablet REORDER    FLUoxetine (PROZAC) 10 MG capsule REORDER         Orders Placed This Encounter   Procedures    Comprehensive Metabolic Panel     Standing Status:   Future     Standing Expiration Date:   6/23/2023    Lipid, Fasting     Standing Status:   Future     Standing Expiration Date:   6/23/2023    Hemoglobin A1C     Standing Status:   Future     Standing Expiration Date:   6/23/2023    CBC     Standing Status:   Future     Standing Expiration Date:   6/23/2023    TSH     Standing Status:   Future     Standing Expiration Date:   6/23/2023    VA OFFICE/OUTPATIENT ESTABLISHED LOW MDM 20-29 MIN         This note was completed by using the assistance of a speech-recognition program. However, inadvertent computerized transcription errors may be present. Although every effort was made to ensure accuracy, no guarantees can be provided that every mistake has been identified and corrected by editing. An electronic signature was used to authenticate this note.     Electronically signed by Prabhu Nguyen MD on 6/24/2022 at 9:08 AM

## 2022-06-24 PROBLEM — Z28.21 COVID-19 VACCINATION DECLINED: Status: RESOLVED | Noted: 2022-05-27 | Resolved: 2022-06-24

## 2022-06-24 PROBLEM — F41.1 GAD (GENERALIZED ANXIETY DISORDER): Status: RESOLVED | Noted: 2019-03-11 | Resolved: 2022-06-24

## 2022-06-24 PROBLEM — U07.1 COVID-19 VIRUS INFECTION: Status: RESOLVED | Noted: 2022-01-09 | Resolved: 2022-06-24

## 2022-07-19 ENCOUNTER — TELEMEDICINE (OUTPATIENT)
Dept: FAMILY MEDICINE CLINIC | Age: 32
End: 2022-07-19
Payer: MEDICARE

## 2022-07-19 DIAGNOSIS — F31.81 BIPOLAR II DISORDER (HCC): ICD-10-CM

## 2022-07-19 DIAGNOSIS — R73.9 HYPERGLYCEMIA: ICD-10-CM

## 2022-07-19 DIAGNOSIS — E66.9 OBESITY (BMI 30.0-34.9): Primary | ICD-10-CM

## 2022-07-19 PROCEDURE — G8427 DOCREV CUR MEDS BY ELIG CLIN: HCPCS | Performed by: FAMILY MEDICINE

## 2022-07-19 PROCEDURE — 99213 OFFICE O/P EST LOW 20 MIN: CPT | Performed by: FAMILY MEDICINE

## 2022-07-19 RX ORDER — PHENTERMINE HYDROCHLORIDE 37.5 MG/1
37.5 TABLET ORAL
Qty: 30 TABLET | Refills: 0 | Status: SHIPPED | OUTPATIENT
Start: 2022-07-19 | End: 2022-08-18

## 2022-07-19 ASSESSMENT — PATIENT HEALTH QUESTIONNAIRE - PHQ9
SUM OF ALL RESPONSES TO PHQ QUESTIONS 1-9: 0
SUM OF ALL RESPONSES TO PHQ9 QUESTIONS 1 & 2: 0
2. FEELING DOWN, DEPRESSED OR HOPELESS: 0
SUM OF ALL RESPONSES TO PHQ QUESTIONS 1-9: 0
1. LITTLE INTEREST OR PLEASURE IN DOING THINGS: 0

## 2022-07-19 ASSESSMENT — ENCOUNTER SYMPTOMS
ABDOMINAL PAIN: 0
COUGH: 0
TROUBLE SWALLOWING: 0
VOMITING: 0
SHORTNESS OF BREATH: 0
NAUSEA: 0
CHEST TIGHTNESS: 0
WHEEZING: 0
DIARRHEA: 0
CONSTIPATION: 0
ABDOMINAL DISTENTION: 0

## 2022-07-19 NOTE — PROGRESS NOTES
2022    TELEHEALTH EVALUATION -- Audio/Visual (During LDPCL-65 public health emergency)      Althea Haywood (:  1990) is a 27 y.o. female,Established patient, here for evaluation of the following chief complaint(s): Weight Management (ADIPEX)        ASSESSMENT/PLAN:    1. Obesity (BMI 30.0-34. 9)  Improving    -     phentermine (ADIPEX-P) 37.5 MG tablet; Take 1 tablet by mouth every morning (before breakfast) for 30 days. , Disp-30 tablet, R-0Normal    Patient was asked about her current diet and exercise habits, and personalized advice was provided regarding recommended lifestyle changes. Patient's comorbid health conditions associated with elevated BMI were discussed, including hyperglycemia and mood disorder, as well as the likely benefits of weight loss. Based upon patient's motivation to change her behavior, the following plan was agreed upon to work toward a weight loss goal of 1-2 pounds Per week . To exercise, low-carb diet, continue Adipex. Educational materials for  weight loss were provided. Patient will follow-up in 1 month(s) with PCP. Provider spent 15 minutes counseling patient. 2. Bipolar II disorder (HCC)  Stable  We will continue to monitor  Continue Prozac 10 Mg daily    3. Hyperglycemia, mild    Low carb, low fat diet, increase fruits and vegetables, and exercise 4-5 times a week 30-40 minutes a day, or walk 1-2 hours per day, or wear a pedometer and get at least 10,000 steps per day.     2022   2  Phentermine 37.5 Mg Tablet     Controlled Substance Monitoring:    Acute and Chronic Pain Monitoring:   RX Monitoring 2022   Attestation -   Periodic Controlled Substance Monitoring Possible medication side effects, risk of tolerance/dependence & alternative treatments discussed. ;No signs of potential drug abuse or diversion identified. ;Assessed functional status.    Chronic Pain > 50 MEDD -           Natasha received counseling on the following healthy behaviors: nutrition, exercise, medication adherence, smoking cessation, and weight loss  Reviewed prior labs and health maintenance  Discussed use, benefit, and side effects of prescribed medications. Barriers to medication compliance addressed. Patient given educational materials - see patient instructions  All patient questions answered. Patient voiced understanding. The patient's past medical,surgical, social, and family history as well as her current medications and allergies were reviewed as documented in today's encounter. Medications, labs, diagnostic studies, consultations and follow-up as documented in this encounter. Return for KEEP APPT. Data Unavailable    Future Appointments   Date Time Provider Cecelia Garner   2022  1:00 PM Da Vaz MD fp sc [email or web address]   [facility]         SUBJECTIVE/OBJECTIVE:  Natasha Haywood (:  1990) has requested an audio/video evaluation for the following concern(s):Weight Management (ADIPEX)    Wants to lose weight. Natasha Haywood was started on Adipex. Patient is here for refill of Adipex #2    In addition to the medication, patient also using diet and exercise as follows:  -diet: Low-carb diet, smaller portions  -exercise: Very active, exercising regularly    Medication side effects: None. Patient denies anorexia, nausea, vomiting, abdominal pain, involuntary weight loss, palpitations, insomnia, irritability, anxiety, headache, and tremor. Urine drug test done none    LMP: Had hysterectomy    Contraceptive method: Hysterectomy    Patient informed that when prescribed a controlled substance for weight loss, the provider is required by law to see the patient for an appointment every thirty days. This is neccessary to record the weight and blood pressure and to assess patient's efforts to lose weight, and to ensure there are no contraindications or adverse effects.      blood pressure is Normal.130/80 well-controlled  BP Readings from Last 3 Encounters:   06/23/22 120/80   03/28/22 134/88   05/19/21 130/86            Weight has been 183 lbs. Patient intentionally lost 3 pounds in 1 month, 5 pounds in 2 months  Wt Readings from Last 3 Encounters:   06/23/22 186 lb (84.4 kg)   05/19/21 188 lb (85.3 kg)   12/25/20 178 lb (80.7 kg)         Exercise Tracking - Detailed 7/19/2022   Walking Duration 25   Walking Intensity Moderate   Walking Times/Week 3   Biking Duration -   Biking Intensity -   Running Duration -   Swimming Duration 45   Swimming Intensity Moderate   Swimming Times/Week 3   Cardiovascular Equipment Duration -   Cardiovascular Equipment Intensity -   Cardiovascular Equipment Times/Week -   Exercise Classes / Videos Duration -   Exercise Classes / Videos Intensity -   Exercise Classes / Videos Times/Week -   Strength Training Duration -   Strength Training Intensity -   Pedometer Steps/Day 38187   Total Exercise Minutes/Week 210       Bipolar disorder  Patient reports she has been under a lot of stress with her  who lost his wallet recently  We recently started her on Prozac. Patient reports she has been tolerating Prozac well, it works well for her, denies side effects      PHQ-2 Over the past 2 weeks, how often have you been bothered by any of the following problems? Little interest or pleasure in doing things: Not at all  Feeling down, depressed, or hopeless: Not at all  PHQ-2 Score: 0  PHQ-9 Over the past 2 weeks, how often have you been bothered by any of the following problems? PHQ-9 Total Score: 0  PHQ-9 Total Score: 0    Blood glucose 128 on 1/8/2021, 102 on 12/25/2020, 100 on 5/29/2019, mildly increased. Denies increased appetite, thirst or polyuria.     Lab Results   Component Value Date    LABA1C 5.2 05/19/2021    LABA1C 5.4 11/12/2019    LABA1C 5.4 10/30/2018           Review of Systems   Constitutional:  Negative for activity change, appetite change, chills, diaphoresis, fatigue, fever and unexpected weight change. HENT:  Negative for trouble swallowing. Respiratory:  Negative for cough, chest tightness, shortness of breath and wheezing. Cardiovascular:  Negative for chest pain, palpitations and leg swelling. Gastrointestinal:  Negative for abdominal distention, abdominal pain, constipation, diarrhea, nausea and vomiting. Neurological:  Negative for tremors and weakness. Psychiatric/Behavioral:  Negative for dysphoric mood and sleep disturbance. The patient is not nervous/anxious. Prior to Visit Medications    Medication Sig Taking? Authorizing Provider   butalbital-acetaminophen-caffeine (FIORICET, ESGIC) -40 MG per tablet Take 1 tablet by mouth every 6 hours as needed for Headaches or Migraine MAX 3 DAYS/WEEK Yes Governor Christine MD   FLUoxetine (PROZAC) 10 MG capsule Take 1 capsule by mouth daily Yes Governor Christine MD   phentermine (ADIPEX-P) 37.5 MG tablet Take 1 tablet by mouth every morning (before breakfast) for 30 days.  Yes Governor Christine MD   loratadine-pseudoephedrine (CLARITIN-D 12 HOUR) 5-120 MG per extended release tablet Take 1 tablet by mouth 2 times daily as needed (congestion) Yes Governor Christine MD   omeprazole (PRILOSEC) 40 MG delayed release capsule TAKE 1 CAPSULE BY MOUTH DAILY DECREASE TO ONCE A DAY Yes Governor Christine MD   cetirizine (ZYRTEC) 10 MG tablet TAKE 1 TABLET BY MOUTH DAILY Yes Governor Christine MD   albuterol sulfate HFA (VENTOLIN HFA) 108 (90 Base) MCG/ACT inhaler Inhale 2 puffs into the lungs 4 times daily as needed for Wheezing Yes Governor Christine MD   Cholecalciferol (VITAMIN D3) 50 MCG (2000 UT) TABS TAKE 1 TABLET BY MOUTH DAILY Yes Governor Christine MD   fluticasone (FLONASE) 50 MCG/ACT nasal spray 2 sprays by Nasal route daily Yes Arie Guerrero MD   Masks (CLEVER CHOICE FACE MASK) MISC USE NEW FACE MASK EVERYDAY WHEN PATIENT GO OUTSIDE OF HOME DUE TO COVID PANDEMIC Yes Kathrin Griffin MD   Elastic Bandages & Supports (WRIST SPLINT/NEOPRENE RIGHT MD) MISC Needs right wrist splint Yes Kathrin Griffin MD   guaiFENesin (MUCINEX) 600 MG extended release tablet Take 1 tablet by mouth 2 times daily  Patient not taking: Reported on 6/23/2022  Kathrin Griffin MD       Social History     Tobacco Use    Smoking status: Every Day     Packs/day: 0.50     Years: 15.00     Pack years: 7.50     Types: Cigarettes    Smokeless tobacco: Never   Substance Use Topics    Alcohol use: Not Currently     Alcohol/week: 0.0 standard drinks     Comment: occasionally    Drug use: No          PHYSICAL EXAMINATION:    Vital Signs: (As obtained by patient/caregiver or practitioner observation)  -vital signs stable and within normal limits except obesity per BMI, BMI 30.6 kg/M2  Patient-Reported Vitals 7/19/2022   Patient-Reported Weight 183.5 pounds   Patient-Reported Height 5'5\"   Patient-Reported Systolic 811   Patient-Reported Diastolic 80   Patient-Reported Pulse 92   Patient-Reported Temperature 97.4   Patient-Reported SpO2 98           Intensity of pain is: 0 out of 10      Constitutional: [x] Appears well-developed and well-nourished [x] No apparent distress      [x] Abnormal-obese      Mental status  [x] Alert and awake  [x] Oriented to person/place/time [x]Able to follow commands      Eyes:  EOM    [x]  Normal  [] Abnormal-  Sclera  [x]  Normal  [] Abnormal -         Discharge [x]  None visible  [] Abnormal -    HENT:   [x] Normocephalic, atraumatic.   [] Abnormal   [x] Mouth/Throat: Mucous membranes are moist.     External Ears [x] Normal  [] Abnormal-     Neck: [x] No visualized mass     Pulmonary/Chest: [x] Respiratory effort normal.  [x] No visualized signs of difficulty breathing or respiratory distress        [] Abnormal        Musculoskeletal:   [x] Normal gait with no signs of ataxia         [x] Normal range of motion of neck        [] Abnormal-       Neurological:        [x] No Facial Asymmetry (Cranial nerve 7 motor function) (limited exam to video visit)          [x] No gaze palsy        [] Abnormal-            Skin:        [x] No significant exanthematous lesions or discoloration noted on facial skin         [] Abnormal-            Psychiatric:      [x] No Hallucinations       [x]Mood is normal      [x]Behavior is normal      [x]Judgment is normal      [x]Thought content is normal       [] Abnormal-     Other pertinent observable physical exam findings-none    Due to this being a TeleHealth encounter, evaluation of the following organ systems is limited: Vitals/Constitutional/EENT/Resp/CV/GI//MS/Neuro/Skin/Heme-Lymph-Imm. I personally reviewed most recent labs reviewed with the patient and all questions fully answered.    Mild hyperglycemia  Prior A1c normal  Otherwise labs within normal limits        Lab Results   Component Value Date    WBC 10.6 01/08/2021    HGB 13.1 01/08/2021    HCT 37.3 01/08/2021    MCV 93.4 01/08/2021     01/08/2021       Lab Results   Component Value Date/Time     01/08/2021 11:51 AM    K 4.2 01/08/2021 11:51 AM     01/08/2021 11:51 AM    CO2 26 01/08/2021 11:51 AM    BUN 12 01/08/2021 11:51 AM    CREATININE 0.67 01/08/2021 11:51 AM    GLUCOSE 128 01/08/2021 11:51 AM    CALCIUM 9.4 01/08/2021 11:51 AM        Lab Results   Component Value Date    ALT 25 01/08/2021    AST 20 01/08/2021    ALKPHOS 78 01/08/2021    BILITOT 0.37 01/08/2021       Lab Results   Component Value Date    TSH 1.30 01/18/2019       No results found for: CHOL  No results found for: TRIG  No results found for: HDL  No results found for: LDLCALC, LDLCHOLESTEROL    No results found for: CHOLHDLRATIO    Lab Results   Component Value Date    LABA1C 5.2 05/19/2021    LABA1C 5.4 11/12/2019    LABA1C 5.4 10/30/2018         No results found for: RFGOJAFE56    No results found for: VITD25    Orders Placed This Encounter   Medications    phentermine (ADIPEX-P) 37.5 MG tablet Sig: Take 1 tablet by mouth every morning (before breakfast) for 30 days. Dispense:  30 tablet     Refill:  0         No orders of the defined types were placed in this encounter. Medications Discontinued During This Encounter   Medication Reason    phentermine (ADIPEX-P) 37.5 MG tablet REORDER    guaiFENesin (MUCINEX) 600 MG extended release tablet Therapy completed              Natasha Haywood, was evaluated through a synchronous (real-time) audio-video encounter. The patient (or guardian if applicable) is aware that this is a billable service, which includes applicable co-pays. The virtual visit was conducted with patient's (and/or legal guardian consent). Verbal consent to proceed has been obtained within the past 12 months. The visit was conducted pursuant to the emergency declaration under the [address] [place] [address], [address] [place] [address] authority and the [employer] and [employer] General Act. Patient identification was verified    Patient was alone   Provider was located at primary practice location. The patient was located at home, in a state where the provider was licensed to provide care. On this date 7/19/2022 I have spent 25 minutes reviewing previous notes, test results and face to face with the patient discussing the diagnosis and importance of compliance with the treatment plan as well as documenting on the day of the visit. --Governor Christine MD on 7/24/2022 at 11:36 PM    An electronic signature was used to authenticate this note.

## 2022-07-24 VITALS — HEIGHT: 65 IN | BODY MASS INDEX: 30.57 KG/M2 | WEIGHT: 183.5 LBS

## 2022-08-02 ENCOUNTER — E-VISIT (OUTPATIENT)
Dept: FAMILY MEDICINE CLINIC | Age: 32
End: 2022-08-02
Payer: MEDICARE

## 2022-08-02 DIAGNOSIS — K52.9 ACUTE GASTROENTERITIS: Primary | ICD-10-CM

## 2022-08-02 PROCEDURE — 99422 OL DIG E/M SVC 11-20 MIN: CPT | Performed by: FAMILY MEDICINE

## 2022-08-02 RX ORDER — ONDANSETRON 4 MG/1
4 TABLET, FILM COATED ORAL EVERY 6 HOURS PRN
Qty: 24 TABLET | Refills: 0 | Status: SHIPPED | OUTPATIENT
Start: 2022-08-02 | End: 2022-08-08

## 2022-08-02 NOTE — LETTER
[facility]  [address] [place] [address] [address] [address]  Phone: [phone]  Fax: [phone]    Chaparrita Noriega MD        August 2, 2022     Patient: Peggy Li   YOB: 1990   Date of Visit: 8/2/2022       To Whom It May Concern: It is my medical opinion that Saúl Hernadez should be excused from work on 8/1/2022.    1. Acute gastroenteritis          If you have any questions or concerns, please don't hesitate to call.     Sincerely,        Chaparrita Noriega MD

## 2022-08-16 ENCOUNTER — TELEPHONE (OUTPATIENT)
Dept: FAMILY MEDICINE CLINIC | Age: 32
End: 2022-08-16

## 2022-08-16 NOTE — TELEPHONE ENCOUNTER
Left voice message at 1:17 PM    Called her  and I left another voice message that she has an appointment and to click on the link    Natasha [employer]ARR[employer]    Mobile Phone      Send Link Via Text    No text has been sent  Last text sent08/16/2022 1:11 PM  To:[phone] for Serene [employer]    Mobile Phone      Send Link Via Text    No text has been sent  Last text sent08/16/2022 1:18 PM  To:[phone]  Email      Send Link Via Email    No email has been sent  Last email sent08/16/2022 1:18 PM  [email or web address]      Left voice message 7:99 PM to click on the message if unable to do so, she will need to reschedule.

## 2022-08-17 NOTE — TELEPHONE ENCOUNTER
Noted  Future Appointments   Date Time Provider Cecelia Garner   6/23/2023  2:00 PM Governor Christine [facility]

## 2022-09-26 ENCOUNTER — E-VISIT (OUTPATIENT)
Dept: FAMILY MEDICINE CLINIC | Age: 32
End: 2022-09-26
Payer: MEDICARE

## 2022-09-26 DIAGNOSIS — J20.9 ACUTE BRONCHITIS DUE TO INFECTION: Primary | ICD-10-CM

## 2022-09-26 DIAGNOSIS — J01.80 ACUTE NON-RECURRENT SINUSITIS OF OTHER SINUS: ICD-10-CM

## 2022-09-26 PROCEDURE — 99422 OL DIG E/M SVC 11-20 MIN: CPT | Performed by: FAMILY MEDICINE

## 2022-09-26 RX ORDER — BENZONATATE 100 MG/1
100 CAPSULE ORAL 3 TIMES DAILY PRN
Qty: 21 CAPSULE | Refills: 0 | Status: SHIPPED | OUTPATIENT
Start: 2022-09-26 | End: 2022-10-03

## 2022-09-26 RX ORDER — ALBUTEROL SULFATE 90 UG/1
2 AEROSOL, METERED RESPIRATORY (INHALATION) EVERY 6 HOURS PRN
Qty: 8 G | Refills: 0 | Status: SHIPPED | OUTPATIENT
Start: 2022-09-26

## 2022-09-26 RX ORDER — AZITHROMYCIN 250 MG/1
TABLET, FILM COATED ORAL
Qty: 6 TABLET | Refills: 0 | Status: SHIPPED | OUTPATIENT
Start: 2022-09-26 | End: 2022-10-01

## 2022-09-26 RX ORDER — COVID-19 MOLECULAR TEST ASSAY
KIT MISCELLANEOUS
Qty: 1 KIT | Refills: 3 | Status: SHIPPED | OUTPATIENT
Start: 2022-09-26

## 2022-09-26 ASSESSMENT — LIFESTYLE VARIABLES
SMOKING_STATUS: YES
SMOKING_YEARS: 15

## 2022-09-26 NOTE — PROGRESS NOTES
HPI: per patient's questionnaire    EXAM: not applicable    Diagnoses and all orders for this visit:    1. Acute bronchitis due to infection  Worsening  - azithromycin (ZITHROMAX) 250 MG tablet; 500 mg orally on day one followed by 250 mg daily on days two through five  Dispense: 6 tablet; Refill: 0  - benzonatate (TESSALON PERLES) 100 MG capsule; Take 1 capsule by mouth 3 times daily as needed for Cough  Dispense: 21 capsule; Refill: 0  - albuterol sulfate HFA (PROVENTIL HFA) 108 (90 Base) MCG/ACT inhaler; Inhale 2 puffs into the lungs every 6 hours as needed for Wheezing or Shortness of Breath Okay to substitute  Dispense: 8 g; Refill: 0  - COVID-19 Test (ID NOW COVID-19) KIT; Patient needs to self test at home, okay to substitute  Dispense: 1 kit; Refill: 3    2. Acute non-recurrent sinusitis of other sinus  Worsening  - azithromycin (ZITHROMAX) 250 MG tablet; 500 mg orally on day one followed by 250 mg daily on days two through five  Dispense: 6 tablet; Refill: 0  - benzonatate (TESSALON PERLES) 100 MG capsule; Take 1 capsule by mouth 3 times daily as needed for Cough  Dispense: 21 capsule; Refill: 0  - COVID-19 Test (ID NOW COVID-19) KIT; Patient needs to self test at home, okay to substitute  Dispense: 1 kit; Refill: 3  Smoking cessation will be advised    No orders of the defined types were placed in this encounter. Patient was advised to contact PCP if symptoms worsen or failing to change as expected        11-20 minutes were spent on the digital evaluation and management of this patient. Electronically signed by Cathleen Farrar MD on 9/26/22 at  3:36 PM      Addendum made on 9/29/2022 at 5:23 PM  --Cathleen Farrar MD on 9/29/2022 at 5:23 PM    Zaynab Roasrio routed conversation to You 1 hour ago (4:02 PM)     Zaynab Rosario 1 hour ago (4:02 PM)     LS  Pt called in stating that her cough is worsening and it is keeping her up at night. Pt had to call off of work on Wed and Thursday.  Pt is inquiring if there is anything else that can be called in for the cough. Note         Arlin Wilks [phone]  Jocelin Gonzalez        Will add chest X-ray  To do COVID test, first one was negative  To stop smoking    Orders Placed This Encounter   Procedures    XR CHEST (2 VW)     Standing Status:   Future     Standing Expiration Date:   9/29/2023     Order Specific Question:   Reason for exam:     Answer:   SOB & cough           An electronic signature was used to authenticate this note.   Electronically signed by Kathrin Griffin MD on 9/29/2022 at 5:23 PM

## 2022-09-29 ENCOUNTER — TELEPHONE (OUTPATIENT)
Dept: FAMILY MEDICINE CLINIC | Age: 32
End: 2022-09-29

## 2022-09-29 NOTE — TELEPHONE ENCOUNTER
Needs CXR  Stop smoking  Do another COVID test  What color is the mucus? Any wheezing?     If worsening shortness of breath to go to ED  Future Appointments   Date Time Provider Cecelia Garner   6/23/2023  2:00 PM Sarah Lindsay MD fp sc [facility]

## 2022-09-29 NOTE — TELEPHONE ENCOUNTER
Pt called in stating that her cough is worsening and it is keeping her up at night. Pt had to call off of work on Wed and Thursday. Pt is inquiring if there is anything else that can be called in for the cough.

## 2022-10-10 ENCOUNTER — PATIENT MESSAGE (OUTPATIENT)
Dept: FAMILY MEDICINE CLINIC | Age: 32
End: 2022-10-10

## 2022-10-10 DIAGNOSIS — H66.92 ACUTE INFECTION OF LEFT EAR: Primary | ICD-10-CM

## 2022-10-10 DIAGNOSIS — J20.9 ACUTE BRONCHITIS DUE TO INFECTION: ICD-10-CM

## 2022-10-10 RX ORDER — CEFUROXIME AXETIL 500 MG/1
500 TABLET ORAL 2 TIMES DAILY
Qty: 14 TABLET | Refills: 0 | Status: SHIPPED | OUTPATIENT
Start: 2022-10-10 | End: 2022-10-17

## 2022-10-10 RX ORDER — NEOMYCIN SULFATE, POLYMYXIN B SULFATE, HYDROCORTISONE 3.5; 10000; 1 MG/ML; [USP'U]/ML; MG/ML
2 SOLUTION/ DROPS AURICULAR (OTIC) EVERY 8 HOURS SCHEDULED
Qty: 10 ML | Refills: 1 | Status: SHIPPED | OUTPATIENT
Start: 2022-10-10 | End: 2022-10-20

## 2022-10-10 NOTE — TELEPHONE ENCOUNTER
From: Palomo Ansari Etts  To: Dr. Lopez Pals: 10/10/2022 12:17 PM EDT  Subject: Still sick     I'm severely congested, I don't feel as if it's in. My chest alot I do cough up quite a but of discolored mucus. I feel like I'm breathing ok. I can't afford any time off work my left ear is severely plugged not alot of pain. Can we please call in a different antibiotic and something for my ear.  I'm covid negative and yes I have quit smoking have not been at all since me getting sick and don't plan to return

## 2022-11-18 ENCOUNTER — E-VISIT (OUTPATIENT)
Dept: FAMILY MEDICINE CLINIC | Age: 32
End: 2022-11-18
Payer: MEDICARE

## 2022-11-18 DIAGNOSIS — Z87.891 HISTORY OF SMOKING: ICD-10-CM

## 2022-11-18 DIAGNOSIS — J30.89 NON-SEASONAL ALLERGIC RHINITIS DUE TO OTHER ALLERGIC TRIGGER: ICD-10-CM

## 2022-11-18 DIAGNOSIS — U07.1 COVID-19 VIRUS INFECTION: Primary | ICD-10-CM

## 2022-11-18 PROCEDURE — 99423 OL DIG E/M SVC 21+ MIN: CPT | Performed by: FAMILY MEDICINE

## 2022-11-18 RX ORDER — IBUPROFEN 800 MG/1
800 TABLET ORAL
Qty: 45 TABLET | Refills: 0 | Status: SHIPPED | OUTPATIENT
Start: 2022-11-18 | End: 2022-12-03

## 2022-11-18 RX ORDER — ACETAMINOPHEN 500 MG
500 TABLET ORAL EVERY 6 HOURS PRN
Qty: 60 TABLET | Refills: 0 | Status: SHIPPED | OUTPATIENT
Start: 2022-11-18 | End: 2022-12-18

## 2022-11-18 RX ORDER — CETIRIZINE HYDROCHLORIDE 10 MG/1
10 TABLET ORAL DAILY
Qty: 30 TABLET | Refills: 1 | Status: SHIPPED | OUTPATIENT
Start: 2022-11-18 | End: 2022-12-18

## 2022-11-18 RX ORDER — FLUTICASONE PROPIONATE 50 MCG
2 SPRAY, SUSPENSION (ML) NASAL 2 TIMES DAILY
Qty: 1 EACH | Refills: 0 | Status: SHIPPED | OUTPATIENT
Start: 2022-11-18 | End: 2022-12-18

## 2022-11-18 RX ORDER — GUAIFENESIN 600 MG/1
600 TABLET, EXTENDED RELEASE ORAL 2 TIMES DAILY
Qty: 30 TABLET | Refills: 0 | Status: SHIPPED | OUTPATIENT
Start: 2022-11-18 | End: 2022-12-03

## 2022-11-18 ASSESSMENT — LIFESTYLE VARIABLES
SMOKING_YEARS: 20
SMOKING_STATUS: NO, BUT I USED TO SMOKE
PACKS_PER_DAY: 1

## 2022-11-18 NOTE — PROGRESS NOTES
[facility] PHYSICIANS  [facility] [address] Bianca Camacho  [address]  [address] [place] [address]  Dept: [phone]      E-VISIT PROGRESS NOTE    HPI  Carolann Pagan is a 34 y.o. female patient  has requested an audio/video evaluation for the following concern(s): See below    PATIENT MESSAGE  Patient Questionnaire Submission  --------------------------------     Questionnaire: Flu Evisit     Question: Have you been exposed to anyone who has recently tested positive for influenza? Answer:   No     Questionnaire: Flu Evisit     Question: Did you receive a flu vaccine within the past year? Answer:   No     Questionnaire: [facility]     Question: Have you traveled within the past month? Answer:   No     Question: If yes, when and where? Answer:        Question: Have you had close contact with anyone who has recently tested positive for COVID-19 (Coronavirus)? Answer:   Yes     Question: If yes, please describe. Answer: Had wedding at the house     Questionnaire: Flu/COVID-19 Evisit     Question: Have you been recently tested for COVID-19? Answer:   Yes     Question: If yes, please enter the date(s) and result(s), if available. Answer:   Positive     Question: Have you been vaccinated for COVID-19? Answer:   Yes     Question: If yes, please enter the name of the vaccine and date(s) administered. Answer: Moderna 2 doses     Question: Have you recently lost your sense of taste or smell? Answer:   No     Questionnaire: Flu Evisit     Question: Do you currently smoke? Answer:   No, but I used to smoke     Questionnaire: Flu Evisit     Question: On average, how many packs per day did you smoke in the past?  Answer:   1     Question: For how many years did you smoke?   Answer:   20     Questionnaire: Flu Evisit     Question: Do you have any chronic illnesses such as diabetes, heart disease, asthma, emphysema, HIV, cancer, or kidney failure OR have you recently taken any medications that can weaken your ability to fight infection, such as prednisone? Answer:   No     Questionnaire: Flu Evisit     Question: Are you pregnant or trying to become pregnant? Answer:   No     Question: Are you breastfeeding? Answer:   No     Question: Have you been hospitilized recently? Answer:   No     Question: Are you currently experiencing any flu-like symptoms, such as fever, body aches, headache, cough, sore throat, nausea/vomiting or diarrhea? Answer:   Yes     Questionnaire: Flu Evisit     Question: How long have you been experiencing these symptoms? Answer:   2 days     Question: Have you been having fevers? Answer:   No, I have not had any fevers     Questionnaire: Flu Evisit     Question: Have you been experiencing significant body aches? Answer:   Yes     Question: Have you been coughing? Answer:   Yes     Questionnaire: Flu Evisit     Question: How often are you coughing? Answer: In spasms that come and go     Question: Have you been coughing up any mucus? Answer:   Yes, I am coughing up a little bit of mucus     Questionnaire: Flu Evisit     Question: What is the appearance of the mucus? Answer: The mucus is clear or white     Questionnaire: Flu Evisit     Question: Have you been experiencing nasal congestion? Answer:   Yes     Question: When you blow your nose, what color is the mucus? Answer:   Mostly thin and yellow or green     Question: Have you had pain or pressure in your forehead, behind your eyes, in your cheeks or in your upper teeth? Answer:   Yes     Question: Have you been sneezing? Answer:   Yes     Question: Has your throat been sore? Answer:   Yes     Question: Have you been hoarse or lost your voice? Answer:   Yes     Question: Have you noticed any swollen glands in your neck? Answer:   No     Question: Have your ears been bothering you? Answer:   Yes     Question: If yes, please describe.   Answer:   Itching and achy     Question: Have your eyes been bothering you? Answer:   Yes     Question: If yes, please describe. Answer:   Burning     Question: Have you had severe headaches, stiff neck, or neck pain when bringing your chin to your chest?  Answer:   No     Question: Have you been experiencing nausea, vomiting, or diarrhea? Answer:   No     Question: If yes, please describe. Answer:        Question: Have you been feeling weak or dizzy, or like you might pass out? Answer:   No     Question: Have you been experiencing swelling of your mouth or tongue, or difficulty swallowing? Answer:   No     Question: Have you been experiecing chest pain or shortness of breath? Answer:   No     Question: Do you have a pulse oximeter? Answer:   Yes     Question: If yes, have the readings been running less than 93%? Answer:   No     Question: Have you developed a new rash? Answer:   No     Question: Are your symptoms better, worse, or staying the same? Answer:   My symptoms are gradually worsening     Question: Have you experienced similar symptoms in the past?  Answer:   Yes     Questionnaire: Flu Evisit     Question: What treatments have worked in the past?  Answer:   Atb     Question: What treatments have not worked? Answer:   Na     Questionnaire: Flu Evisit     Question: Are you currently taking medications or using any other treatments for your symptoms? Answer:   No     Questionnaire: Flu Evisit     Question: Is there anything else related to your flu symptoms or exposure that you would like to share? Answer:    has covid, i have tested all kids they are negative at this time.   Review of Systems   Past Medical History:   Diagnosis Date    Anxiety     ASCUS (atypical squamous cells of undetermined significance) on Pap smear 8/1/07, 1/26/10, 6/13/11, 7/10/12, 4/24/13    Bipolar II disorder ([facility] [address]     Cervical high risk HPV (human papillomavirus) test positive 1/26/10, 6/13/11, 7/10/12, 4/24/13    Chronic back pain     Chronic kidney disease     Chronic pelvic pain in female     BRUCE II (cervical intraepithelial neoplasia II) 7/22/13    on LEEP    COVID-19 virus infection 1/9/2022    Cyst of left kidney     Depression     Dysmenorrhea 6/27/2013    GABBY (generalized anxiety disorder) 3/11/2019    Gastroesophageal reflux disease without esophagitis 5/24/2019    Hernia     History of total abdominal hysterectomy 7/7/14    RSO    HPV (human papilloma virus) infection 10/27/2011    Laryngeal Papillomatosis counseling and Route reviewed     Incomplete RBBB 1/23/2019    Intractable headache r/o meningitis 10/16/2014    Menopausal hot flushes 10/14/2014    Obesity 3/5/2019    Obesity (BMI 30.0-34.9) 10/8/2014    Ovarian cyst rupture     was hospitalized for infection 2014 (April?)    Ovarian cyst, bilateral     Ovarian cyst, follicular 4/7/0498    SMALL CYST. Right side, 4cm, +doppler flow Tumor markers negative     PTSD (post-traumatic stress disorder)     Seasonal allergic rhinitis 5/24/2019    Seasonal allergic rhinitis     Tobacco abuse     Tobacco abuse     Urinary retention with incomplete bladder emptying     HX    Wears glasses     night vision issues      Allergies   Allergen Reactions    Shellfish-Derived Products     Levaquin [Levofloxacin In D5w]      Tendon pain         Medication List            Accurate as of November 18, 2022  7:43 AM. If you have any questions, ask your nurse or doctor.                 START taking these medications      acetaminophen 500 MG tablet  Commonly known as: TYLENOL  Take 1 tablet by mouth every 6 hours as needed for Pain (Take 1-2 tablets every 6 hours)  Started by: CONRAD Stephenson CNP     guaiFENesin 600 MG extended release tablet  Commonly known as: MUCINEX  Take 1 tablet by mouth 2 times daily for 15 days  Started by: CONRAD Stephenson CNP     ibuprofen 800 MG tablet  Commonly known as: ADVIL;MOTRIN  Take 1 tablet by mouth 3 times daily (with meals) for 15 days  Started by: Audrey Guillory Gauamis, APRN - CNP     nirmatrelvir/ritonavir 20 x 150 MG & 10 x 100MG Tbpk  Commonly known as: PAXLOVID  Take 3 tablets (two 150 mg nirmatrelvir and one 100 mg ritonavir tablets) by mouth every 12 hours for 5 days.   Started by: CONRAD Navarro CNP            CHANGE how you take these medications      fluticasone 50 MCG/ACT nasal spray  Commonly known as: FLONASE  2 sprays by Each Nostril route in the morning and at bedtime  What changed:   how to take this  when to take this  Changed by: CONRAD Garrido CNP            CONTINUE taking these medications      * albuterol sulfate  (90 Base) MCG/ACT inhaler  Commonly known as: Ventolin HFA  Inhale 2 puffs into the lungs 4 times daily as needed for Wheezing     * albuterol sulfate  (90 Base) MCG/ACT inhaler  Commonly known as: Proventil HFA  Inhale 2 puffs into the lungs every 6 hours as needed for Wheezing or Shortness of Breath Okay to substitute     butalbital-acetaminophen-caffeine -40 MG per tablet  Commonly known as: FIORICET, ESGIC  Take 1 tablet by mouth every 6 hours as needed for Headaches or Migraine MAX 3 DAYS/WEEK     cetirizine 10 MG tablet  Commonly known as: ZYRTEC  Take 1 tablet by mouth daily     Claritin-D 12 Hour 5-120 MG per extended release tablet  Generic drug: loratadine-pseudoephedrine  Take 1 tablet by mouth 2 times daily as needed (congestion)     Leonard Choice Face Mask Misc  USE NEW FACE MASK EVERYDAY WHEN PATIENT GO OUTSIDE OF HOME DUE TO COVID PANDEMIC     FLUoxetine 10 MG capsule  Commonly known as: PROZAC  Take 1 capsule by mouth daily     ID Now COVID-19 Kit  Generic drug: COVID-19 Test  Patient needs to self test at home, okay to substitute     omeprazole 40 MG delayed release capsule  Commonly known as: PRILOSEC  TAKE 1 CAPSULE BY MOUTH DAILY DECREASE TO ONCE A DAY     Vitamin D3 50 MCG (2000 UT) Tabs  TAKE 1 TABLET BY MOUTH DAILY     Wrist Splint/Neoprene Right Md Misc  Needs right wrist splint           * This list has 2 medication(s) that are the same as other medications prescribed for you. Read the directions carefully, and ask your doctor or other care provider to review them with you.                    Where to Get Your Medications        These medications were sent to [address] [place] [address], [address] [place] [address] Megan Coelho [address] [place] [address], [address] [place] [address]      Phone: [phone]   acetaminophen 500 MG tablet  cetirizine 10 MG tablet  fluticasone 50 MCG/ACT nasal spray  guaiFENesin 600 MG extended release tablet  ibuprofen 800 MG tablet  nirmatrelvir/ritonavir 20 x 150 MG & 10 x 100MG Tbpk          Based on the symptoms reported by the patient, it seems that patient has   CURRENT MEDS W/ ASSOC DIAG           Start Date End Date     albuterol sulfate HFA (PROVENTIL HFA) 108 (90 Base) MCG/ACT inhaler  09/26/22  --     Inhale 2 puffs into the lungs every 6 hours as needed for Wheezing or Shortness of Breath Okay to substitute     Associated Diagnoses:  Acute bronchitis due to infection     albuterol sulfate HFA (VENTOLIN HFA) 108 (90 Base) MCG/ACT inhaler  01/06/22  --     Inhale 2 puffs into the lungs 4 times daily as needed for Wheezing     Associated Diagnoses:  COVID-19 virus infection, Acute bronchitis due to infection     butalbital-acetaminophen-caffeine (FIORICET, ESGIC) -40 MG per tablet  06/23/22  --     Take 1 tablet by mouth every 6 hours as needed for Headaches or Migraine MAX 3 DAYS/WEEK     Associated Diagnoses:  Chronic tension-type headache, not intractable     cetirizine (ZYRTEC) 10 MG tablet  03/08/22  --     TAKE 1 TABLET BY MOUTH DAILY     Associated Diagnoses:  Seasonal allergic rhinitis due to other allergic trigger     Cholecalciferol (VITAMIN D3) 50 MCG (2000 UT) TABS  11/15/21  --     TAKE 1 TABLET BY MOUTH DAILY     Associated Diagnoses:  Musculoskeletal chest pain     COVID-19 Test (ID NOW COVID-19) KIT  09/26/22 --     Patient needs to self test at home, okay to substitute     Associated Diagnoses:  Acute bronchitis due to infection, Acute non-recurrent sinusitis of other sinus     Elastic Bandages & Supports (WRIST SPLINT/NEOPRENE RIGHT [place] [facility]  [place]  --     Needs right wrist splint     Associated Diagnoses:  Tendinitis of right wrist     FLUoxetine (PROZAC) 10 MG capsule  06/23/22  --     Take 1 capsule by mouth daily     Associated Diagnoses:  Bipolar II disorder ([facility])     fluticasone (FLONASE) 50 MCG/ACT nasal spray  10/27/21  --     2 sprays by Nasal route daily     Associated Diagnoses:  Acute bacterial sinusitis     loratadine-pseudoephedrine (CLARITIN-D 12 HOUR) 5-120 MG per extended release tablet  05/19/22  --     Take 1 tablet by mouth 2 times daily as needed (congestion)     Associated Diagnoses:  Acute non-recurrent sinusitis of other sinus, Acute bronchitis due to infection     Masks (CLEVER CHOICE FACE MASK[place] [facility]  06/26/20  --     USE NEW FACE MASK EVERYDAY WHEN PATIENT GO OUTSIDE OF HOME DUE TO COVID PANDEMIC     Associated Diagnoses:  Bipolar II disorder ([facility] [address] Smokes and motivated to quit     omeprazole (PRILOSEC) 40 MG delayed release capsule  04/29/22  --     TAKE 1 CAPSULE BY MOUTH DAILY DECREASE TO ONCE A DAY     Associated Diagnoses:  Gastroesophageal reflux disease without esophagitis           Orders Placed This Encounter   Medications    nirmatrelvir/ritonavir (PAXLOVID) 20 x 150 MG & 10 x 100MG TBPK     Sig: Take 3 tablets (two 150 mg nirmatrelvir and one 100 mg ritonavir tablets) by mouth every 12 hours for 5 days. Dispense:  30 tablet     Refill:  0     Order Specific Question:   Does this patient qualify for COVID-19 antIviral therapy based on criteria for treatment?      Answer:   Yes    guaiFENesin (MUCINEX) 600 MG extended release tablet     Sig: Take 1 tablet by mouth 2 times daily for 15 days     Dispense:  30 tablet     Refill:  0    cetirizine (ZYRTEC) 10 MG tablet     Sig: Take

## 2022-12-20 ENCOUNTER — OFFICE VISIT (OUTPATIENT)
Dept: FAMILY MEDICINE CLINIC | Age: 32
End: 2022-12-20
Payer: MEDICARE

## 2022-12-20 VITALS
DIASTOLIC BLOOD PRESSURE: 87 MMHG | SYSTOLIC BLOOD PRESSURE: 125 MMHG | HEART RATE: 92 BPM | OXYGEN SATURATION: 98 % | TEMPERATURE: 98.3 F

## 2022-12-20 DIAGNOSIS — H66.001 NON-RECURRENT ACUTE SUPPURATIVE OTITIS MEDIA OF RIGHT EAR WITHOUT SPONTANEOUS RUPTURE OF TYMPANIC MEMBRANE: Primary | ICD-10-CM

## 2022-12-20 PROCEDURE — G8484 FLU IMMUNIZE NO ADMIN: HCPCS

## 2022-12-20 PROCEDURE — G8427 DOCREV CUR MEDS BY ELIG CLIN: HCPCS

## 2022-12-20 PROCEDURE — G8417 CALC BMI ABV UP PARAM F/U: HCPCS

## 2022-12-20 PROCEDURE — 99213 OFFICE O/P EST LOW 20 MIN: CPT

## 2022-12-20 PROCEDURE — 4004F PT TOBACCO SCREEN RCVD TLK: CPT

## 2022-12-20 RX ORDER — AMOXICILLIN AND CLAVULANATE POTASSIUM 875; 125 MG/1; MG/1
1 TABLET, FILM COATED ORAL 2 TIMES DAILY
Qty: 20 TABLET | Refills: 0 | Status: SHIPPED | OUTPATIENT
Start: 2022-12-20 | End: 2022-12-30

## 2022-12-20 ASSESSMENT — ENCOUNTER SYMPTOMS
RHINORRHEA: 1
ABDOMINAL PAIN: 0
SHORTNESS OF BREATH: 0
COUGH: 1
SORE THROAT: 1
DIARRHEA: 0

## 2022-12-20 NOTE — PROGRESS NOTES
[facility]  [address] [facility] [address] [place] [address]  Dept: [phone]  Dept Fax: [phone]    Elisabeth Be is a 34 y.o. female who presents to the urgent care today for her medical conditions/complaints as notedbelow. Natasha CARRASQUILLO Etts is c/o of Congestion (Post nasal drip , onset for a week otc tylenol cold and flu  and mucenix. ), Otalgia (Right ear pain ), and Cough      HPI:     Otalgia   There is pain in the right ear. This is a new problem. The current episode started in the past 7 days (in the past 2 days). The problem occurs constantly. The problem has been gradually worsening. There has been no fever. Associated symptoms include coughing, headaches, hearing loss, neck pain, rhinorrhea and a sore throat. Pertinent negatives include no abdominal pain, diarrhea, ear discharge or rash. Treatments tried: OTC cold medications. The treatment provided no relief. There is no history of a chronic ear infection, hearing loss or a tympanostomy tube. Cough  This is a new problem. The current episode started in the past 7 days. The problem has been gradually worsening. The problem occurs every few minutes. The cough is Productive of sputum and productive of purulent sputum. Associated symptoms include ear congestion, ear pain, headaches, nasal congestion, rhinorrhea and a sore throat. Pertinent negatives include no fever, rash, shortness of breath or sweats. Nothing aggravates the symptoms. Treatments tried: OTC cold medications. There is no history of asthma, bronchitis, COPD, environmental allergies or pneumonia.      Past Medical History:   Diagnosis Date    Anxiety     ASCUS (atypical squamous cells of undetermined significance) on Pap smear 8/1/07, 1/26/10, 6/13/11, 7/10/12, 4/24/13    Bipolar II disorder ([facility] [address]     Cervical high risk HPV (human papillomavirus) test positive 1/26/10, 6/13/11, 7/10/12, 4/24/13 Chronic back pain     Chronic kidney disease     Chronic pelvic pain in female     BRUCE II (cervical intraepithelial neoplasia II) 7/22/13    on LEEP    COVID-19 virus infection 1/9/2022    Cyst of left kidney     Depression     Dysmenorrhea 6/27/2013    GABBY (generalized anxiety disorder) 3/11/2019    Gastroesophageal reflux disease without esophagitis 5/24/2019    Hernia     History of total abdominal hysterectomy 7/7/14    RSO    HPV (human papilloma virus) infection 10/27/2011    Laryngeal Papillomatosis counseling and Route reviewed     Incomplete RBBB 1/23/2019    Intractable headache r/o meningitis 10/16/2014    Menopausal hot flushes 10/14/2014    Obesity 3/5/2019    Obesity (BMI 30.0-34.9) 10/8/2014    Ovarian cyst rupture     was hospitalized for infection 2014 (April?)    Ovarian cyst, bilateral     Ovarian cyst, follicular 9/9/9949    SMALL CYST.  Right side, 4cm, +doppler flow Tumor markers negative     PTSD (post-traumatic stress disorder)     Seasonal allergic rhinitis 5/24/2019    Seasonal allergic rhinitis     Tobacco abuse     Tobacco abuse     Urinary retention with incomplete bladder emptying     HX    Wears glasses     night vision issues        Current Outpatient Medications   Medication Sig Dispense Refill    amoxicillin-clavulanate (AUGMENTIN) 875-125 MG per tablet Take 1 tablet by mouth 2 times daily for 10 days 20 tablet 0    albuterol sulfate HFA (PROVENTIL HFA) 108 (90 Base) MCG/ACT inhaler Inhale 2 puffs into the lungs every 6 hours as needed for Wheezing or Shortness of Breath Okay to substitute 8 g 0    COVID-19 Test (ID NOW COVID-19) KIT Patient needs to self test at home, okay to substitute 1 kit 3    butalbital-acetaminophen-caffeine (FIORICET, ESGIC) -40 MG per tablet Take 1 tablet by mouth every 6 hours as needed for Headaches or Migraine MAX 3 DAYS/WEEK 30 tablet 0    FLUoxetine (PROZAC) 10 MG capsule Take 1 capsule by mouth daily 90 capsule 3    loratadine-pseudoephedrine (CLARITIN-D 12 HOUR) 5-120 MG per extended release tablet Take 1 tablet by mouth 2 times daily as needed (congestion) 10 tablet 0    omeprazole (PRILOSEC) 40 MG delayed release capsule TAKE 1 CAPSULE BY MOUTH DAILY DECREASE TO ONCE A DAY 90 capsule 0    albuterol sulfate HFA (VENTOLIN HFA) 108 (90 Base) MCG/ACT inhaler Inhale 2 puffs into the lungs 4 times daily as needed for Wheezing 18 g 0    Cholecalciferol (VITAMIN D3) 50 MCG (2000 UT) TABS TAKE 1 TABLET BY MOUTH DAILY 90 tablet 3    Masks ([place] CHOICE FACE MASK[place] [facility] [place] NEW FACE MASK EVERYDAY WHEN PATIENT GO OUTSIDE OF HOME DUE TO COVID PANDEMIC 30 each 5    Elastic Bandages & Supports (WRIST SPLINT/NEOPRENE RIGHT MD) [facility] [place]eds right wrist splint 1 each 0    acetaminophen (TYLENOL) 500 MG tablet Take 1 tablet by mouth every 6 hours as needed for Pain (Take 1-2 tablets every 6 hours) 60 tablet 0    ibuprofen (ADVIL;MOTRIN) 800 MG tablet Take 1 tablet by mouth 3 times daily (with meals) for 15 days 45 tablet 0    fluticasone (FLONASE) 50 MCG/ACT nasal spray 2 sprays by Each Nostril route in the morning and at bedtime 1 each 0     No current facility-administered medications for this visit. Allergies   Allergen Reactions    Shellfish-Derived Products     Levaquin [Levofloxacin In D5w]      Tendon pain       Subjective:      Review of Systems   Constitutional:  Negative for fever. HENT:  Positive for congestion, ear pain, hearing loss, rhinorrhea and sore throat. Negative for ear discharge. Respiratory:  Positive for cough. Negative for shortness of breath. Gastrointestinal:  Negative for abdominal pain and diarrhea. Musculoskeletal:  Positive for neck pain. Skin:  Negative for rash. Allergic/Immunologic: Negative for environmental allergies. Neurological:  Positive for headaches. All other systems reviewed and are negative. 14 systems reviewed and negative except as listed in HPI. Objective:     Physical Exam  Vitals reviewed. Constitutional:       General: She is not in acute distress. Appearance: She is ill-appearing. She is not toxic-appearing or diaphoretic. HENT:      Head: Normocephalic and atraumatic. Right Ear: External ear normal. Tenderness present. No drainage or swelling. A middle ear effusion is present. Tympanic membrane is injected and erythematous. Tympanic membrane is not perforated. Left Ear: Tympanic membrane and external ear normal. No drainage, swelling or tenderness. No middle ear effusion. Tympanic membrane is not injected, perforated or erythematous. Nose: Nasal tenderness and congestion present. Right Sinus: Maxillary sinus tenderness and frontal sinus tenderness present. Left Sinus: No maxillary sinus tenderness or frontal sinus tenderness. Mouth/Throat:      Lips: Pink. Mouth: Mucous membranes are moist.      Pharynx: Oropharynx is clear. Posterior oropharyngeal erythema present. No pharyngeal swelling or oropharyngeal exudate. Eyes:      General:         Right eye: No discharge. Left eye: No discharge. Extraocular Movements: Extraocular movements intact. Conjunctiva/sclera: Conjunctivae normal.      Pupils: Pupils are equal, round, and reactive to light. Cardiovascular:      Rate and Rhythm: Normal rate and regular rhythm. Heart sounds: Normal heart sounds. Pulmonary:      Effort: Pulmonary effort is normal. No respiratory distress. Breath sounds: Normal breath sounds. No stridor. No wheezing, rhonchi or rales. Chest:      Chest wall: No tenderness. Musculoskeletal:         General: No swelling or tenderness. Normal range of motion. Cervical back: Normal range of motion and neck supple. No rigidity. Lymphadenopathy:      Cervical: Cervical adenopathy present. Skin:     General: Skin is warm and dry. Capillary Refill: Capillary refill takes less than 2 seconds. Findings: No erythema or rash.    Neurological: Mental Status: She is alert and oriented to person, place, and time. Motor: No weakness. Coordination: Coordination normal.      Gait: Gait normal.   Psychiatric:         Mood and Affect: Mood normal.         Behavior: Behavior normal.         Thought Content: Thought content normal.         Judgment: Judgment normal.     /87 (Site: Left Upper Arm, Position: Sitting, Cuff Size: Large Adult)   Pulse 92   Temp 98.3 °F (36.8 °C) (Tympanic)   LMP 06/21/2013   SpO2 98%     Assessment:       Diagnosis Orders   1. Non-recurrent acute suppurative otitis media of right ear without spontaneous rupture of tympanic membrane  amoxicillin-clavulanate (AUGMENTIN) 875-125 MG per tablet          Plan:   -Based on patient's history and exam findings, I will treat this as an otitis media.  -Patient instructed to complete antibiotic prescription fully.  -Increase fluids. -May use Motrin/Tylenol for fever/pain as directed on the bottle. -Warm compresses as desired for ear pain. -Instructed to increase fluid intake.   -Suggested humidifier and mist therapy.  -Avoid irritants such as smoke. -May use over-the-counter expectorant such as Robitussin.   -Go to the ER for any emergent concern. Return if symptoms worsen or fail to improve. Orders Placed This Encounter   Medications    amoxicillin-clavulanate (AUGMENTIN) 875-125 MG per tablet     Sig: Take 1 tablet by mouth 2 times daily for 10 days     Dispense:  20 tablet     Refill:  0         Patient given educational materials - see patient instructions. Discussed use, benefit, and side effects of prescribed medications. All patient questions answered. Pt voiced understanding.     Electronically signed by CONRAD Weir NP on 12/20/2022 at 10:23 AM

## 2022-12-21 ENCOUNTER — PATIENT MESSAGE (OUTPATIENT)
Dept: FAMILY MEDICINE CLINIC | Age: 32
End: 2022-12-21

## 2022-12-21 NOTE — LETTER
[facility]  [address] [place] [address] [address] [address]  Phone: [phone]  Fax: [phone]    Ansley Bagley MD        December 21, 2022     Patient: Leonides Garcia   YOB: 1990   Date of Visit: 12/21/2022       To Whom it May Concern:    Natasha Haywood had physical exam on 6/23/2022 with myself. If you have any questions or concerns, please don't hesitate to call.     Sincerely,         Ansley Bagley MD

## 2022-12-21 NOTE — TELEPHONE ENCOUNTER
From: Darling Osorio Etts  To: Dr. Rainey Blue: 12/21/2022 2:20 PM EST  Subject: Physical     Can you please send me a letter stating I had completed my physical in June please and thank you

## 2023-06-23 ENCOUNTER — OFFICE VISIT (OUTPATIENT)
Dept: FAMILY MEDICINE CLINIC | Age: 33
End: 2023-06-23

## 2023-06-23 VITALS
BODY MASS INDEX: 32.72 KG/M2 | WEIGHT: 196.4 LBS | HEIGHT: 65 IN | SYSTOLIC BLOOD PRESSURE: 120 MMHG | OXYGEN SATURATION: 100 % | RESPIRATION RATE: 12 BRPM | HEART RATE: 74 BPM | TEMPERATURE: 98.5 F | DIASTOLIC BLOOD PRESSURE: 88 MMHG

## 2023-06-23 DIAGNOSIS — F31.81 BIPOLAR II DISORDER (HCC): ICD-10-CM

## 2023-06-23 DIAGNOSIS — R53.82 CHRONIC FATIGUE: ICD-10-CM

## 2023-06-23 DIAGNOSIS — L23.9 ALLERGIC DERMATITIS: ICD-10-CM

## 2023-06-23 DIAGNOSIS — N60.01 CYST OF RIGHT BREAST: ICD-10-CM

## 2023-06-23 DIAGNOSIS — R73.9 HYPERGLYCEMIA: ICD-10-CM

## 2023-06-23 DIAGNOSIS — Z13.6 SCREENING FOR CARDIOVASCULAR CONDITION: ICD-10-CM

## 2023-06-23 DIAGNOSIS — Z00.01 ENCOUNTER FOR WELL ADULT EXAM WITH ABNORMAL FINDINGS: Primary | ICD-10-CM

## 2023-06-23 DIAGNOSIS — R63.5 UNINTENDED WEIGHT GAIN: ICD-10-CM

## 2023-06-23 RX ORDER — BUPROPION HYDROCHLORIDE 150 MG/1
150 TABLET ORAL EVERY MORNING
Qty: 30 TABLET | Refills: 3 | Status: SHIPPED | OUTPATIENT
Start: 2023-06-23

## 2023-06-23 ASSESSMENT — PATIENT HEALTH QUESTIONNAIRE - PHQ9
5. POOR APPETITE OR OVEREATING: 0
9. THOUGHTS THAT YOU WOULD BE BETTER OFF DEAD, OR OF HURTING YOURSELF: 0
5. POOR APPETITE OR OVEREATING: NOT AT ALL
SUM OF ALL RESPONSES TO PHQ QUESTIONS 1-9: 7
SUM OF ALL RESPONSES TO PHQ QUESTIONS 1-9: 7
7. TROUBLE CONCENTRATING ON THINGS, SUCH AS READING THE NEWSPAPER OR WATCHING TELEVISION: 0
SUM OF ALL RESPONSES TO PHQ9 QUESTIONS 1 & 2: 1
8. MOVING OR SPEAKING SO SLOWLY THAT OTHER PEOPLE COULD HAVE NOTICED. OR THE OPPOSITE, BEING SO FIGETY OR RESTLESS THAT YOU HAVE BEEN MOVING AROUND A LOT MORE THAN USUAL: 0
1. LITTLE INTEREST OR PLEASURE IN DOING THINGS: SEVERAL DAYS
2. FEELING DOWN, DEPRESSED OR HOPELESS: 0
7. TROUBLE CONCENTRATING ON THINGS, SUCH AS READING THE NEWSPAPER OR WATCHING TELEVISION: NOT AT ALL
10. IF YOU CHECKED OFF ANY PROBLEMS, HOW DIFFICULT HAVE THESE PROBLEMS MADE IT FOR YOU TO DO YOUR WORK, TAKE CARE OF THINGS AT HOME, OR GET ALONG WITH OTHER PEOPLE: 0
2. FEELING DOWN, DEPRESSED OR HOPELESS: NOT AT ALL
8. MOVING OR SPEAKING SO SLOWLY THAT OTHER PEOPLE COULD HAVE NOTICED. OR THE OPPOSITE - BEING SO FIDGETY OR RESTLESS THAT YOU HAVE BEEN MOVING AROUND A LOT MORE THAN USUAL: NOT AT ALL
9. THOUGHTS THAT YOU WOULD BE BETTER OFF DEAD, OR OF HURTING YOURSELF: NOT AT ALL
6. FEELING BAD ABOUT YOURSELF - OR THAT YOU ARE A FAILURE OR HAVE LET YOURSELF OR YOUR FAMILY DOWN: NOT AT ALL
3. TROUBLE FALLING OR STAYING ASLEEP: 3
4. FEELING TIRED OR HAVING LITTLE ENERGY: 3
6. FEELING BAD ABOUT YOURSELF - OR THAT YOU ARE A FAILURE OR HAVE LET YOURSELF OR YOUR FAMILY DOWN: 0
10. IF YOU CHECKED OFF ANY PROBLEMS, HOW DIFFICULT HAVE THESE PROBLEMS MADE IT FOR YOU TO DO YOUR WORK, TAKE CARE OF THINGS AT HOME, OR GET ALONG WITH OTHER PEOPLE: NOT DIFFICULT AT ALL
4. FEELING TIRED OR HAVING LITTLE ENERGY: SEVERAL DAYS
SUM OF ALL RESPONSES TO PHQ QUESTIONS 1-9: 2
SUM OF ALL RESPONSES TO PHQ QUESTIONS 1-9: 7
3. TROUBLE FALLING OR STAYING ASLEEP: NOT AT ALL
SUM OF ALL RESPONSES TO PHQ QUESTIONS 1-9: 7
1. LITTLE INTEREST OR PLEASURE IN DOING THINGS: 1

## 2023-06-23 ASSESSMENT — ENCOUNTER SYMPTOMS
CHEST TIGHTNESS: 0
BLOOD IN STOOL: 0
SHORTNESS OF BREATH: 0
WHEEZING: 0
DIARRHEA: 0
ABDOMINAL DISTENTION: 0
NAUSEA: 0
COUGH: 0
CONSTIPATION: 0
ABDOMINAL PAIN: 0
VOMITING: 0
BACK PAIN: 0

## 2023-06-23 ASSESSMENT — VISUAL ACUITY
OS_CC: 20/13
OD_CC: 20/10
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
## 2023-06-24 PROBLEM — N60.01 CYST OF RIGHT BREAST: Status: ACTIVE | Noted: 2023-06-24

## 2023-08-11 ENCOUNTER — TELEMEDICINE (OUTPATIENT)
Dept: FAMILY MEDICINE CLINIC | Age: 33
End: 2023-08-11
Payer: COMMERCIAL

## 2023-08-11 ENCOUNTER — TELEPHONE (OUTPATIENT)
Dept: FAMILY MEDICINE CLINIC | Age: 33
End: 2023-08-11

## 2023-08-11 VITALS — HEIGHT: 65 IN | WEIGHT: 192.3 LBS | BODY MASS INDEX: 32.04 KG/M2

## 2023-08-11 DIAGNOSIS — R73.9 HYPERGLYCEMIA: ICD-10-CM

## 2023-08-11 DIAGNOSIS — E66.9 OBESITY (BMI 30.0-34.9): Primary | ICD-10-CM

## 2023-08-11 DIAGNOSIS — F31.81 BIPOLAR II DISORDER (HCC): ICD-10-CM

## 2023-08-11 PROBLEM — K21.9 GASTROESOPHAGEAL REFLUX DISEASE WITHOUT ESOPHAGITIS: Status: RESOLVED | Noted: 2019-05-24 | Resolved: 2023-08-11

## 2023-08-11 PROCEDURE — G8427 DOCREV CUR MEDS BY ELIG CLIN: HCPCS | Performed by: FAMILY MEDICINE

## 2023-08-11 PROCEDURE — 99213 OFFICE O/P EST LOW 20 MIN: CPT | Performed by: FAMILY MEDICINE

## 2023-08-11 RX ORDER — PHENTERMINE HYDROCHLORIDE 37.5 MG/1
37.5 TABLET ORAL
Qty: 30 TABLET | Refills: 0 | Status: SHIPPED | OUTPATIENT
Start: 2023-08-11 | End: 2023-09-10

## 2023-08-11 SDOH — ECONOMIC STABILITY: FOOD INSECURITY: WITHIN THE PAST 12 MONTHS, YOU WORRIED THAT YOUR FOOD WOULD RUN OUT BEFORE YOU GOT MONEY TO BUY MORE.: NEVER TRUE

## 2023-08-11 SDOH — ECONOMIC STABILITY: INCOME INSECURITY: HOW HARD IS IT FOR YOU TO PAY FOR THE VERY BASICS LIKE FOOD, HOUSING, MEDICAL CARE, AND HEATING?: SOMEWHAT HARD

## 2023-08-11 SDOH — ECONOMIC STABILITY: FOOD INSECURITY: WITHIN THE PAST 12 MONTHS, THE FOOD YOU BOUGHT JUST DIDN'T LAST AND YOU DIDN'T HAVE MONEY TO GET MORE.: NEVER TRUE

## 2023-08-11 ASSESSMENT — PATIENT HEALTH QUESTIONNAIRE - PHQ9
SUM OF ALL RESPONSES TO PHQ9 QUESTIONS 1 & 2: 0
SUM OF ALL RESPONSES TO PHQ QUESTIONS 1-9: 0
1. LITTLE INTEREST OR PLEASURE IN DOING THINGS: 0
SUM OF ALL RESPONSES TO PHQ QUESTIONS 1-9: 0
2. FEELING DOWN, DEPRESSED OR HOPELESS: 0

## 2023-08-11 ASSESSMENT — ENCOUNTER SYMPTOMS
DIARRHEA: 0
WHEEZING: 0
CONSTIPATION: 0
ABDOMINAL DISTENTION: 0
ABDOMINAL PAIN: 0
CHEST TIGHTNESS: 0
NAUSEA: 0
SHORTNESS OF BREATH: 0
COUGH: 0
VOMITING: 0

## 2023-08-11 NOTE — TELEPHONE ENCOUNTER
Return in about 4 weeks (around 9/8/2023) for ADIPEX #2, **Do EXERCISE FLOWSHEET.     Adipex No. 3 in 8 weeks    Also to do her labs orders in the computer

## 2023-08-11 NOTE — PROGRESS NOTES
01/08/2021 11:51 AM    GLUCOSE 128 01/08/2021 11:51 AM    CALCIUM 9.4 01/08/2021 11:51 AM        Lab Results   Component Value Date    ALT 25 01/08/2021    AST 20 01/08/2021    ALKPHOS 78 01/08/2021    BILITOT 0.37 01/08/2021       Lab Results   Component Value Date    TSH 1.30 01/18/2019       No results found for: CHOL  No results found for: TRIG  No results found for: HDL  No results found for: LDLCALC, LDLCHOLESTEROL    No results found for: CHOLHDLRATIO    Lab Results   Component Value Date    LABA1C 5.2 05/19/2021    LABA1C 5.4 11/12/2019    LABA1C 5.4 10/30/2018         No results found for: FZQUSRNQ50    No results found for: VITD25    Orders Placed This Encounter   Medications    phentermine (ADIPEX-P) 37.5 MG tablet     Sig: Take 1 tablet by mouth every morning (before breakfast) for 30 days. Max Daily Amount: 37.5 mg     Dispense:  30 tablet     Refill:  0       No orders of the defined types were placed in this encounter. Medications Discontinued During This Encounter   Medication Reason    triamcinolone (KENALOG) 0.1 % ointment Therapy completed    buPROPion (WELLBUTRIN XL) 150 MG extended release tablet Patient Choice    albuterol sulfate HFA (PROVENTIL HFA) 108 (90 Base) MCG/ACT inhaler Therapy completed    COVID-19 Test (ID NOW COVID-19) KIT Therapy completed    Masks (CLEVER CHOICE FACE MASK) MISC Therapy completed    Elastic Bandages & Supports (WRIST SPLINT/NEOPRENE RIGHT RICHARD) MISC Therapy completed            Patient was alone     Natasha FOREIGN Haywood, was evaluated through a synchronous (real-time) audio-video encounter. The patient (or guardian if applicable) is aware that this is a billable service, which includes applicable co-pays. This Virtual Visit was conducted with patient's (and/or legal guardian's) consent. Patient identification was verified, and a caregiver was present when appropriate. The patient was located at Other:  At work, in [facility]  Provider was located at [facility]

## 2023-08-14 NOTE — TELEPHONE ENCOUNTER
8/14/2023  Patient is MY-CHART STATUS ACTIVE. A My-Chart message has been sent out to patient. Per Provider request to deliver message to patient in regard to scheduling appt. No future appointments.

## 2023-09-19 ENCOUNTER — E-VISIT (OUTPATIENT)
Dept: FAMILY MEDICINE CLINIC | Age: 33
End: 2023-09-19
Payer: COMMERCIAL

## 2023-09-19 DIAGNOSIS — B96.89 ACUTE BACTERIAL SINUSITIS: Primary | ICD-10-CM

## 2023-09-19 DIAGNOSIS — J01.90 ACUTE BACTERIAL SINUSITIS: Primary | ICD-10-CM

## 2023-09-19 PROCEDURE — 99423 OL DIG E/M SVC 21+ MIN: CPT | Performed by: FAMILY MEDICINE

## 2023-09-19 RX ORDER — GUAIFENESIN 600 MG/1
600 TABLET, EXTENDED RELEASE ORAL 2 TIMES DAILY
Qty: 30 TABLET | Refills: 0 | Status: SHIPPED | OUTPATIENT
Start: 2023-09-19

## 2023-09-19 RX ORDER — FLUTICASONE PROPIONATE 50 MCG
1 SPRAY, SUSPENSION (ML) NASAL DAILY
Qty: 32 G | Refills: 1 | Status: SHIPPED | OUTPATIENT
Start: 2023-09-19

## 2023-09-19 RX ORDER — AZITHROMYCIN 250 MG/1
TABLET, FILM COATED ORAL
Qty: 6 TABLET | Refills: 0 | Status: SHIPPED | OUTPATIENT
Start: 2023-09-19 | End: 2023-09-24

## 2023-09-19 ASSESSMENT — LIFESTYLE VARIABLES
SMOKING_YEARS: 15
SMOKING_STATUS: NO, I'M A FORMER SMOKER
PACKS_PER_DAY: 1

## 2023-09-19 NOTE — PROGRESS NOTES
Natasha Haywood (1990) initiated an asynchronous digital communication through [address] [place] [address]. Date of service: 9/19/2023     HPI: per patient's questionnaire    EXAM: not applicable    Diagnoses and all orders for this visit:    1. Acute bacterial sinusitis    - fluticasone (FLONASE) 50 MCG/ACT nasal spray; 1 spray by Each Nostril route daily  Dispense: 32 g; Refill: 1  - azithromycin (ZITHROMAX) 250 MG tablet; 500 mg orally on day one followed by 250 mg daily on days two through five  Dispense: 6 tablet; Refill: 0  - guaiFENesin (MUCINEX) 600 MG extended release tablet; Take 1 tablet by mouth 2 times daily  Dispense: 30 tablet; Refill: 0      No orders of the defined types were placed in this encounter. Patient was advised to contact PCP if symptoms worsen or failing to change as expected      Time: EV3 - 21 or more minutes were spent on the digital evaluation and management of this patient.     Electronically signed by Carlos Eduardo Yap MD on 9/19/23 at 12:17 PM.

## 2023-11-27 ENCOUNTER — E-VISIT (OUTPATIENT)
Dept: FAMILY MEDICINE CLINIC | Age: 33
End: 2023-11-27
Payer: COMMERCIAL

## 2023-11-27 ENCOUNTER — TELEPHONE (OUTPATIENT)
Dept: FAMILY MEDICINE CLINIC | Age: 33
End: 2023-11-27

## 2023-11-27 DIAGNOSIS — U07.1 COVID-19 VIRUS INFECTION: Primary | ICD-10-CM

## 2023-11-27 PROCEDURE — 99423 OL DIG E/M SVC 21+ MIN: CPT | Performed by: FAMILY MEDICINE

## 2023-11-27 RX ORDER — BENZONATATE 100 MG/1
100 CAPSULE ORAL 3 TIMES DAILY PRN
Qty: 21 CAPSULE | Refills: 0 | Status: SHIPPED | OUTPATIENT
Start: 2023-11-27 | End: 2023-12-04

## 2023-11-27 ASSESSMENT — LIFESTYLE VARIABLES
SMOKING_STATUS: NO, BUT I USED TO SMOKE
SMOKING_YEARS: 20
PACKS_PER_DAY: 1

## 2023-11-27 NOTE — TELEPHONE ENCOUNTER
Please check with mom, she submitted e-visit for all kids, when did their symptoms started? She requested letters for school    They all need to isolate for 5 days, day 0 is the day symptoms started, she reported symptoms were present for 3 days in one of the E visits, but it was for adult. What is the fax for schools or can she get it from [address] [place] [address]? To  the meds from [facility].

## 2023-11-27 NOTE — PROGRESS NOTES
Natasha Haywood (1990) initiated an asynchronous digital communication through [address] [place] [address]. Date of service: 11/27/2023     HPI: per patient's questionnaire    EXAM: not applicable    Diagnoses and all orders for this visit:    1. COVID-19 virus infection  Ongoing  Patient is high risk of more severe infection due to not being vaccinated against COVID-19, she only received 2 dosages, last dosage 5/19/2021  - benzonatate (TESSALON PERLES) 100 MG capsule; Take 1 capsule by mouth 3 times daily as needed for Cough  Dispense: 21 capsule; Refill: 0  - nirmatrelvir/ritonavir 300/100 (PAXLOVID) 20 x 150 MG & 10 x 100MG TBPK; Take 3 tablets (two 150 mg nirmatrelvir and one 100 mg ritonavir tablets) by mouth every 12 hours for 5 days. Dispense: 30 tablet; Refill: 0    Absolutely to stop smoking. If any yellow mucus will add antibiotic at that time  We could also add an albuterol inhaler    She reports symptoms for 3 days, so this is the second day, she will need to isolate for 3 more days, will need to give her letter for work  She could return back to work the sixth day with mask if symptoms dramatically improved and afebrile  No orders of the defined types were placed in this encounter. Patient was advised to contact PCP if symptoms worsen or failing to change as expected      Time: EV3 - 21 or more minutes were spent on the digital evaluation and management of this patient.     Electronically signed by Shiela Nava MD on 11/27/23 at 10:22 AM.

## 2024-02-21 ENCOUNTER — TELEMEDICINE (OUTPATIENT)
Dept: FAMILY MEDICINE CLINIC | Age: 34
End: 2024-02-21
Payer: COMMERCIAL

## 2024-02-21 DIAGNOSIS — J01.90 ACUTE SINUSITIS, RECURRENCE NOT SPECIFIED, UNSPECIFIED LOCATION: ICD-10-CM

## 2024-02-21 DIAGNOSIS — J06.9 UPPER RESPIRATORY TRACT INFECTION, UNSPECIFIED TYPE: Primary | ICD-10-CM

## 2024-02-21 PROCEDURE — 99422 OL DIG E/M SVC 11-20 MIN: CPT | Performed by: NURSE PRACTITIONER

## 2024-02-21 RX ORDER — METHYLPREDNISOLONE 4 MG/1
TABLET ORAL
Qty: 1 KIT | Refills: 0 | Status: SHIPPED | OUTPATIENT
Start: 2024-02-21 | End: 2024-02-27

## 2024-02-21 RX ORDER — DOXYCYCLINE HYCLATE 100 MG
100 TABLET ORAL 2 TIMES DAILY
Qty: 14 TABLET | Refills: 0 | Status: SHIPPED | OUTPATIENT
Start: 2024-02-21 | End: 2024-02-28

## 2024-02-21 SDOH — ECONOMIC STABILITY: INCOME INSECURITY: HOW HARD IS IT FOR YOU TO PAY FOR THE VERY BASICS LIKE FOOD, HOUSING, MEDICAL CARE, AND HEATING?: NOT VERY HARD

## 2024-02-21 SDOH — ECONOMIC STABILITY: FOOD INSECURITY: WITHIN THE PAST 12 MONTHS, YOU WORRIED THAT YOUR FOOD WOULD RUN OUT BEFORE YOU GOT MONEY TO BUY MORE.: NEVER TRUE

## 2024-02-21 SDOH — ECONOMIC STABILITY: FOOD INSECURITY: WITHIN THE PAST 12 MONTHS, THE FOOD YOU BOUGHT JUST DIDN'T LAST AND YOU DIDN'T HAVE MONEY TO GET MORE.: NEVER TRUE

## 2024-02-21 ASSESSMENT — ENCOUNTER SYMPTOMS
CHEST TIGHTNESS: 0
DIARRHEA: 0
ABDOMINAL PAIN: 0
BLOOD IN STOOL: 0
SORE THROAT: 1
CONSTIPATION: 0
BACK PAIN: 0
NAUSEA: 0
COUGH: 1
ABDOMINAL DISTENTION: 0
SHORTNESS OF BREATH: 0
RHINORRHEA: 1
SINUS PRESSURE: 1
VOMITING: 0
WHEEZING: 0

## 2024-02-21 ASSESSMENT — PATIENT HEALTH QUESTIONNAIRE - PHQ9
5. POOR APPETITE OR OVEREATING: 0
SUM OF ALL RESPONSES TO PHQ9 QUESTIONS 1 & 2: 0
8. MOVING OR SPEAKING SO SLOWLY THAT OTHER PEOPLE COULD HAVE NOTICED. OR THE OPPOSITE, BEING SO FIGETY OR RESTLESS THAT YOU HAVE BEEN MOVING AROUND A LOT MORE THAN USUAL: 0
SUM OF ALL RESPONSES TO PHQ QUESTIONS 1-9: 0
7. TROUBLE CONCENTRATING ON THINGS, SUCH AS READING THE NEWSPAPER OR WATCHING TELEVISION: 0
1. LITTLE INTEREST OR PLEASURE IN DOING THINGS: 0
SUM OF ALL RESPONSES TO PHQ QUESTIONS 1-9: 0
6. FEELING BAD ABOUT YOURSELF - OR THAT YOU ARE A FAILURE OR HAVE LET YOURSELF OR YOUR FAMILY DOWN: 0
10. IF YOU CHECKED OFF ANY PROBLEMS, HOW DIFFICULT HAVE THESE PROBLEMS MADE IT FOR YOU TO DO YOUR WORK, TAKE CARE OF THINGS AT HOME, OR GET ALONG WITH OTHER PEOPLE: 0
9. THOUGHTS THAT YOU WOULD BE BETTER OFF DEAD, OR OF HURTING YOURSELF: 0
SUM OF ALL RESPONSES TO PHQ QUESTIONS 1-9: 0
2. FEELING DOWN, DEPRESSED OR HOPELESS: 0
SUM OF ALL RESPONSES TO PHQ QUESTIONS 1-9: 0
3. TROUBLE FALLING OR STAYING ASLEEP: 0
4. FEELING TIRED OR HAVING LITTLE ENERGY: 0

## 2024-02-21 NOTE — PROGRESS NOTES
2024    TELEHEALTH EVALUATION -- Audio/Visual    Natasha Haywood (:  1990) is a 33 y.o. female,Established patient, here for evaluation of the following chief complaint(s): Cough (Symptoms started  ), Congestion, Pharyngitis, and Otalgia    Patient is here today for a visit with complaints of some nasal congestion, hoarseness of her voice, cough, sore throat and starting to feel like there is some pressure in your ears.  She denies any hearing changes, denies any trouble swallowing.  She states her  has similar symptoms.  She states she was sick back in December as well as November, she was sick in December with viral URI of some sort, possible sinusitis, was treated with an antibiotic at that time.  Back in November she had COVID.  She states she did test herself at work for COVID and RSV both of which were negative recently.  The symptoms have been going on for about 4 days now.  She states she has a lot of pressure in her face.  We discussed different vitamins to use, such as vitamin C and D as well as zinc and she states she is going to add this into her regimen.  She already takes vitamin D.  I recommend starting with a steroid, seeing if this improves her symptoms as this may be viral in nature.  If symptoms continue or worsen over the next few days, she can start the antibiotic, which I will also call in for her.  She is in agreements with this plan of care.  We discussed warning signs and when to reach out and she states an understanding.  All questions and concerns were addressed to the best of my ability.    ASSESSMENT/PLAN:    1. Upper respiratory tract infection, unspecified type  -For about 4 to 5 days  -Not improving or worsening  -Tested herself for COVID-19 RSV both of which were negative  -See note above for plan of care  -     methylPREDNISolone (MEDROL DOSEPACK) 4 MG tablet; Take by mouth., Disp-1 kit, R-0Normal  -     doxycycline hyclate (VIBRA-TABS) 100 MG tablet;

## 2024-02-21 NOTE — PROGRESS NOTES
Visit Information    Have you changed or started any medications since your last visit including any over-the-counter medicines, vitamins, or herbal medicines? no   Are you having any side effects from any of your medications? -  no  Have you stopped taking any of your medications? Is so, why? -  no    Have you seen any other physician or provider since your last visit? No  Have you had any other diagnostic tests since your last visit? No  Have you been seen in the emergency room and/or had an admission to a hospital since we last saw you? No  Have you had your routine dental cleaning in the past 6 months? no    Have you activated your [employer] account? If not, what are your barriers? Yes     Patient Care Team:  Ruby Dowell MD as PCP - General  Ruby Dowell MD as PCP - Empaneled Provider  Geremias Self DO as Consulting Physician (Obstetrics & Gynecology)  Glynn Chen MD as Consulting Physician (Gastroenterology)  Quincy Hicks MD as Surgeon (Cardiology)  William Vu MD as Consulting Physician (Urology)    Medical History Review  Past Medical, Family, and Social History reviewed and does contribute to the patient presenting condition    Health Maintenance   Topic Date Due    Hepatitis B vaccine (1 of 3 - 3-dose series) Never done    Flu vaccine (1) 08/01/2023    COVID-19 Vaccine (3 - 2023-24 season) 09/01/2023    Depression Monitoring  08/11/2024    DTaP/Tdap/Td vaccine (2 - Td or Tdap) 03/19/2029    Hepatitis C screen  Completed    HIV screen  Completed    Hepatitis A vaccine  Aged Out    Hib vaccine  Aged Out    HPV vaccine  Aged Out    Polio vaccine  Aged Out    Meningococcal (ACWY) vaccine  Aged Out    Pneumococcal 0-64 years Vaccine  Aged Out    A1C test (Diabetic or Prediabetic)  Discontinued    Varicella vaccine  Discontinued    Depression Screen  Discontinued    Cervical cancer screen  Discontinued

## 2024-05-10 ENCOUNTER — APPOINTMENT (OUTPATIENT)
Dept: GENERAL RADIOLOGY | Age: 34
End: 2024-05-10
Payer: OTHER MISCELLANEOUS

## 2024-05-10 ENCOUNTER — HOSPITAL ENCOUNTER (EMERGENCY)
Age: 34
Discharge: HOME | End: 2024-05-10
Attending: STUDENT IN AN ORGANIZED HEALTH CARE EDUCATION/TRAINING PROGRAM
Payer: OTHER MISCELLANEOUS

## 2024-05-10 ENCOUNTER — TELEPHONE (OUTPATIENT)
Dept: FAMILY MEDICINE CLINIC | Age: 34
End: 2024-05-10

## 2024-05-10 ENCOUNTER — APPOINTMENT (OUTPATIENT)
Dept: CT IMAGING | Age: 34
End: 2024-05-10
Payer: OTHER MISCELLANEOUS

## 2024-05-10 VITALS
TEMPERATURE: 97.6 F | SYSTOLIC BLOOD PRESSURE: 110 MMHG | OXYGEN SATURATION: 99 % | DIASTOLIC BLOOD PRESSURE: 71 MMHG | BODY MASS INDEX: 30.49 KG/M2 | WEIGHT: 183 LBS | HEIGHT: 65 IN | RESPIRATION RATE: 17 BRPM | HEART RATE: 78 BPM

## 2024-05-10 DIAGNOSIS — S16.1XXA ACUTE STRAIN OF NECK MUSCLE, INITIAL ENCOUNTER: Primary | ICD-10-CM

## 2024-05-10 DIAGNOSIS — S46.009A ROTATOR CUFF INJURY, INITIAL ENCOUNTER: ICD-10-CM
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
## 2024-05-10 DIAGNOSIS — G44.229 CHRONIC TENSION-TYPE HEADACHE, NOT INTRACTABLE: ICD-10-CM

## 2024-05-10 PROCEDURE — 96375 TX/PRO/DX INJ NEW DRUG ADDON: CPT

## 2024-05-10 PROCEDURE — 99284 EMERGENCY DEPT VISIT MOD MDM: CPT

## 2024-05-10 PROCEDURE — 72125 CT NECK SPINE W/O DYE: CPT

## 2024-05-10 PROCEDURE — 73030 X-RAY EXAM OF SHOULDER: CPT

## 2024-05-10 PROCEDURE — 6360000002 HC RX W HCPCS: Performed by: PHYSICIAN ASSISTANT

## 2024-05-10 PROCEDURE — 6370000000 HC RX 637 (ALT 250 FOR IP): Performed by: PHYSICIAN ASSISTANT

## 2024-05-10 PROCEDURE — 96372 THER/PROPH/DIAG INJ SC/IM: CPT

## 2024-05-10 PROCEDURE — 96374 THER/PROPH/DIAG INJ IV PUSH: CPT

## 2024-05-10 PROCEDURE — 2580000003 HC RX 258: Performed by: PHYSICIAN ASSISTANT

## 2024-05-10 RX ORDER — DIPHENHYDRAMINE HYDROCHLORIDE 50 MG/ML
25 INJECTION INTRAMUSCULAR; INTRAVENOUS ONCE
Status: COMPLETED | OUTPATIENT
Start: 2024-05-10 | End: 2024-05-10

## 2024-05-10 RX ORDER — 0.9 % SODIUM CHLORIDE 0.9 %
1000 INTRAVENOUS SOLUTION INTRAVENOUS ONCE
Status: COMPLETED | OUTPATIENT
Start: 2024-05-10 | End: 2024-05-10

## 2024-05-10 RX ORDER — ORPHENADRINE CITRATE 30 MG/ML
60 INJECTION INTRAMUSCULAR; INTRAVENOUS ONCE
Status: COMPLETED | OUTPATIENT
Start: 2024-05-10 | End: 2024-05-10

## 2024-05-10 RX ORDER — ONDANSETRON 4 MG/1
4 TABLET, ORALLY DISINTEGRATING ORAL ONCE
Status: COMPLETED | OUTPATIENT
Start: 2024-05-10 | End: 2024-05-10

## 2024-05-10 RX ORDER — KETOROLAC TROMETHAMINE 30 MG/ML
30 INJECTION, SOLUTION INTRAMUSCULAR; INTRAVENOUS ONCE
Status: COMPLETED | OUTPATIENT
Start: 2024-05-10 | End: 2024-05-10

## 2024-05-10 RX ORDER — METHOCARBAMOL 750 MG/1
750 TABLET, FILM COATED ORAL EVERY 6 HOURS PRN
Qty: 40 TABLET | Refills: 0 | Status: SHIPPED | OUTPATIENT
Start: 2024-05-10

## 2024-05-10 RX ORDER — ACETAMINOPHEN 500 MG
1000 TABLET ORAL EVERY 6 HOURS PRN
Qty: 60 TABLET | Refills: 0 | Status: SHIPPED | OUTPATIENT
Start: 2024-05-10

## 2024-05-10 RX ORDER — IBUPROFEN 800 MG/1
800 TABLET ORAL EVERY 8 HOURS PRN
Qty: 30 TABLET | Refills: 0 | Status: SHIPPED | OUTPATIENT
Start: 2024-05-10

## 2024-05-10 RX ORDER — METOCLOPRAMIDE HYDROCHLORIDE 5 MG/ML
10 INJECTION INTRAMUSCULAR; INTRAVENOUS ONCE
Status: COMPLETED | OUTPATIENT
Start: 2024-05-10 | End: 2024-05-10

## 2024-05-10 RX ORDER — ACETAMINOPHEN 500 MG
1000 TABLET ORAL ONCE
Status: COMPLETED | OUTPATIENT
Start: 2024-05-10 | End: 2024-05-10

## 2024-05-10 RX ADMIN — KETOROLAC TROMETHAMINE 30 MG: 30 INJECTION, SOLUTION INTRAMUSCULAR at 11:07

## 2024-05-10 RX ADMIN — DIPHENHYDRAMINE HYDROCHLORIDE 25 MG: 50 INJECTION INTRAMUSCULAR; INTRAVENOUS at 13:00

## 2024-05-10 RX ADMIN — ONDANSETRON 4 MG: 4 TABLET, ORALLY DISINTEGRATING ORAL at 11:17

## 2024-05-10 RX ADMIN — METOCLOPRAMIDE 10 MG: 5 INJECTION, SOLUTION INTRAMUSCULAR; INTRAVENOUS at 13:00

## 2024-05-10 RX ADMIN — ORPHENADRINE CITRATE 60 MG: 60 INJECTION INTRAMUSCULAR; INTRAVENOUS at 11:15

## 2024-05-10 RX ADMIN — ACETAMINOPHEN 1000 MG: 500 TABLET ORAL at 11:15

## 2024-05-10 RX ADMIN — SODIUM CHLORIDE 1000 ML: 9 INJECTION, SOLUTION INTRAVENOUS at 12:48

## 2024-05-10 ASSESSMENT — PAIN - FUNCTIONAL ASSESSMENT
PAIN_FUNCTIONAL_ASSESSMENT: 0-10
PAIN_FUNCTIONAL_ASSESSMENT: 0-10

## 2024-05-10 ASSESSMENT — PAIN SCALES - GENERAL
PAINLEVEL_OUTOF10: 7
PAINLEVEL_OUTOF10: 7

## 2024-05-10 ASSESSMENT — PAIN DESCRIPTION - ORIENTATION: ORIENTATION: RIGHT

## 2024-05-10 ASSESSMENT — PAIN DESCRIPTION - LOCATION
LOCATION: NECK
LOCATION: NECK

## 2024-05-10 ASSESSMENT — PAIN DESCRIPTION - DESCRIPTORS
DESCRIPTORS: SHARP;ACHING
DESCRIPTORS: DULL

## 2024-05-10 ASSESSMENT — LIFESTYLE VARIABLES
HOW OFTEN DO YOU HAVE A DRINK CONTAINING ALCOHOL: NEVER
HOW MANY STANDARD DRINKS CONTAINING ALCOHOL DO YOU HAVE ON A TYPICAL DAY: PATIENT DOES NOT DRINK

## 2024-05-10 ASSESSMENT — PAIN DESCRIPTION - PAIN TYPE: TYPE: ACUTE PAIN
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
## (undated) DEVICE — GOWN,POLY REINFORCED,LG: Brand: MEDLINE

## (undated) DEVICE — DEFENDO AIR WATER SUCTION AND BIOPSY VALVE KIT FOR  OLYMPUS: Brand: DEFENDO AIR/WATER/SUCTION AND BIOPSY VALVE

## (undated) DEVICE — GLOVE ORANGE PI 7   MSG9070

## (undated) DEVICE — BITEBLOCK 54FR W/ DENT RIM BLOX

## (undated) DEVICE — JELLY,LUBE,STERILE,FLIP TOP,TUBE,2-OZ: Brand: MEDLINE